# Patient Record
Sex: FEMALE | Race: BLACK OR AFRICAN AMERICAN | NOT HISPANIC OR LATINO | Employment: FULL TIME | URBAN - METROPOLITAN AREA
[De-identification: names, ages, dates, MRNs, and addresses within clinical notes are randomized per-mention and may not be internally consistent; named-entity substitution may affect disease eponyms.]

---

## 2017-01-14 ENCOUNTER — HOSPITAL ENCOUNTER (EMERGENCY)
Facility: CLINIC | Age: 22
Discharge: HOME | End: 2017-01-14
Attending: EMERGENCY MEDICINE | Admitting: EMERGENCY MEDICINE

## 2017-01-14 ENCOUNTER — APPOINTMENT (OUTPATIENT)
Dept: GENERAL RADIOLOGY | Facility: CLINIC | Age: 22
End: 2017-01-14
Attending: EMERGENCY MEDICINE

## 2017-01-14 VITALS
HEART RATE: 84 BPM | TEMPERATURE: 99.8 F | SYSTOLIC BLOOD PRESSURE: 126 MMHG | DIASTOLIC BLOOD PRESSURE: 78 MMHG | RESPIRATION RATE: 16 BRPM | OXYGEN SATURATION: 98 %

## 2017-01-14 DIAGNOSIS — J06.9 UPPER RESPIRATORY TRACT INFECTION, UNSPECIFIED TYPE: ICD-10-CM

## 2017-01-14 LAB
ALBUMIN UR-MCNC: NEGATIVE MG/DL
ANION GAP SERPL CALCULATED.3IONS-SCNC: 4 MMOL/L (ref 3–14)
APPEARANCE UR: CLEAR
BASOPHILS # BLD AUTO: 0 10E9/L (ref 0–0.2)
BASOPHILS NFR BLD AUTO: 0.3 %
BILIRUB UR QL STRIP: NEGATIVE
BUN SERPL-MCNC: 11 MG/DL (ref 7–30)
CALCIUM SERPL-MCNC: 8.9 MG/DL (ref 8.5–10.1)
CHLORIDE SERPL-SCNC: 106 MMOL/L (ref 94–109)
CO2 SERPL-SCNC: 28 MMOL/L (ref 20–32)
COLOR UR AUTO: NORMAL
CREAT SERPL-MCNC: 0.96 MG/DL (ref 0.52–1.04)
DIFFERENTIAL METHOD BLD: ABNORMAL
EOSINOPHIL # BLD AUTO: 0 10E9/L (ref 0–0.7)
EOSINOPHIL NFR BLD AUTO: 0 %
ERYTHROCYTE [DISTWIDTH] IN BLOOD BY AUTOMATED COUNT: 13.3 % (ref 10–15)
FLUAV+FLUBV AG SPEC QL: NEGATIVE
FLUAV+FLUBV AG SPEC QL: NORMAL
GFR SERPL CREATININE-BSD FRML MDRD: 72 ML/MIN/1.7M2
GLUCOSE SERPL-MCNC: 92 MG/DL (ref 70–99)
GLUCOSE UR STRIP-MCNC: NEGATIVE MG/DL
HCT VFR BLD AUTO: 40.5 % (ref 35–47)
HGB BLD-MCNC: 13.7 G/DL (ref 11.7–15.7)
HGB UR QL STRIP: NEGATIVE
IMM GRANULOCYTES # BLD: 0 10E9/L (ref 0–0.4)
IMM GRANULOCYTES NFR BLD: 0.1 %
KETONES UR STRIP-MCNC: NEGATIVE MG/DL
LEUKOCYTE ESTERASE UR QL STRIP: NEGATIVE
LYMPHOCYTES # BLD AUTO: 1.1 10E9/L (ref 0.8–5.3)
LYMPHOCYTES NFR BLD AUTO: 14.9 %
MCH RBC QN AUTO: 31.1 PG (ref 26.5–33)
MCHC RBC AUTO-ENTMCNC: 33.8 G/DL (ref 31.5–36.5)
MCV RBC AUTO: 92 FL (ref 78–100)
MONOCYTES # BLD AUTO: 1.6 10E9/L (ref 0–1.3)
MONOCYTES NFR BLD AUTO: 22.7 %
NEUTROPHILS # BLD AUTO: 4.4 10E9/L (ref 1.6–8.3)
NEUTROPHILS NFR BLD AUTO: 62 %
NITRATE UR QL: NEGATIVE
NRBC # BLD AUTO: 0 10*3/UL
NRBC BLD AUTO-RTO: 0 /100
PH UR STRIP: 6 PH (ref 5–7)
PLATELET # BLD AUTO: 248 10E9/L (ref 150–450)
PLATELET # BLD EST: NORMAL 10*3/UL
POTASSIUM SERPL-SCNC: 4.1 MMOL/L (ref 3.4–5.3)
RBC # BLD AUTO: 4.41 10E12/L (ref 3.8–5.2)
RBC MORPH BLD: NORMAL
SODIUM SERPL-SCNC: 138 MMOL/L (ref 133–144)
SP GR UR STRIP: 1 (ref 1–1.03)
SPECIMEN SOURCE: NORMAL
URN SPEC COLLECT METH UR: NORMAL
UROBILINOGEN UR STRIP-MCNC: NORMAL MG/DL (ref 0–2)
WBC # BLD AUTO: 7.1 10E9/L (ref 4–11)

## 2017-01-14 PROCEDURE — 25000125 ZZHC RX 250: Performed by: EMERGENCY MEDICINE

## 2017-01-14 PROCEDURE — 96375 TX/PRO/DX INJ NEW DRUG ADDON: CPT | Performed by: EMERGENCY MEDICINE

## 2017-01-14 PROCEDURE — 71020 XR CHEST 2 VW: CPT

## 2017-01-14 PROCEDURE — 87804 INFLUENZA ASSAY W/OPTIC: CPT | Performed by: EMERGENCY MEDICINE

## 2017-01-14 PROCEDURE — 96374 THER/PROPH/DIAG INJ IV PUSH: CPT | Performed by: EMERGENCY MEDICINE

## 2017-01-14 PROCEDURE — 80048 BASIC METABOLIC PNL TOTAL CA: CPT | Performed by: EMERGENCY MEDICINE

## 2017-01-14 PROCEDURE — 99284 EMERGENCY DEPT VISIT MOD MDM: CPT | Mod: 25 | Performed by: EMERGENCY MEDICINE

## 2017-01-14 PROCEDURE — 25000132 ZZH RX MED GY IP 250 OP 250 PS 637: Performed by: EMERGENCY MEDICINE

## 2017-01-14 PROCEDURE — 99284 EMERGENCY DEPT VISIT MOD MDM: CPT | Mod: Z6 | Performed by: EMERGENCY MEDICINE

## 2017-01-14 PROCEDURE — 96361 HYDRATE IV INFUSION ADD-ON: CPT | Performed by: EMERGENCY MEDICINE

## 2017-01-14 PROCEDURE — 81003 URINALYSIS AUTO W/O SCOPE: CPT | Performed by: EMERGENCY MEDICINE

## 2017-01-14 PROCEDURE — 25000128 H RX IP 250 OP 636: Performed by: EMERGENCY MEDICINE

## 2017-01-14 PROCEDURE — 85025 COMPLETE CBC W/AUTO DIFF WBC: CPT | Performed by: EMERGENCY MEDICINE

## 2017-01-14 RX ORDER — KETOROLAC TROMETHAMINE 30 MG/ML
30 INJECTION, SOLUTION INTRAMUSCULAR; INTRAVENOUS ONCE
Status: COMPLETED | OUTPATIENT
Start: 2017-01-14 | End: 2017-01-14

## 2017-01-14 RX ORDER — ACETAMINOPHEN 325 MG/1
650 TABLET ORAL ONCE
Status: COMPLETED | OUTPATIENT
Start: 2017-01-14 | End: 2017-01-14

## 2017-01-14 RX ORDER — ONDANSETRON 2 MG/ML
4 INJECTION INTRAMUSCULAR; INTRAVENOUS EVERY 30 MIN PRN
Status: DISCONTINUED | OUTPATIENT
Start: 2017-01-14 | End: 2017-01-14 | Stop reason: HOSPADM

## 2017-01-14 RX ORDER — SODIUM CHLORIDE 9 MG/ML
1000 INJECTION, SOLUTION INTRAVENOUS CONTINUOUS
Status: DISCONTINUED | OUTPATIENT
Start: 2017-01-14 | End: 2017-01-14 | Stop reason: HOSPADM

## 2017-01-14 RX ORDER — IBUPROFEN 600 MG/1
600 TABLET, FILM COATED ORAL EVERY 8 HOURS PRN
Qty: 30 TABLET | Refills: 0 | Status: ON HOLD | OUTPATIENT
Start: 2017-01-14 | End: 2019-02-18

## 2017-01-14 RX ADMIN — SODIUM CHLORIDE 1000 ML: 9 INJECTION, SOLUTION INTRAVENOUS at 07:41

## 2017-01-14 RX ADMIN — ACETAMINOPHEN 650 MG: 325 TABLET, FILM COATED ORAL at 07:34

## 2017-01-14 RX ADMIN — KETOROLAC TROMETHAMINE 30 MG: 30 INJECTION, SOLUTION INTRAMUSCULAR at 07:41

## 2017-01-14 RX ADMIN — ONDANSETRON 4 MG: 2 INJECTION INTRAMUSCULAR; INTRAVENOUS at 07:46

## 2017-01-14 ASSESSMENT — ENCOUNTER SYMPTOMS
COUGH: 1
DYSURIA: 0
NAUSEA: 1
VOMITING: 1
TROUBLE SWALLOWING: 1
FEVER: 1
ABDOMINAL PAIN: 0
FATIGUE: 1

## 2017-01-14 NOTE — ED PROVIDER NOTES
History     Chief Complaint   Patient presents with     Flu Symptoms     HPI  Rajan Graham is a 21 year old female who presents to the ER for 2 days of dizziness, cough, body aches and fever.  Pt say that she feels dizzy and weak when she wakes up.  Has been feeling sore in her throat and vomited x 2 today.  Went to a clinic yesterday and was tested negative for strep throat. Denies any abd pain.  Denies any urinary symptoms.  Notes green sputum with cough. Low grade fever today and has not taken any medications for this.      I have reviewed the Medications, Allergies, Past Medical and Surgical History, and Social History in the Epic system.    Review of Systems   Constitutional: Positive for fever and fatigue.   HENT: Positive for congestion and trouble swallowing.    Respiratory: Positive for cough.    Gastrointestinal: Positive for nausea and vomiting. Negative for abdominal pain.   Genitourinary: Negative for dysuria.   All other systems reviewed and are negative.      Physical Exam   BP: 130/90 mmHg  Pulse: 108  Temp: 100.1  F (37.8  C)  Resp: 16  SpO2: 94 %  Physical Exam   Constitutional: She is oriented to person, place, and time. She appears well-developed and well-nourished.   HENT:   Head: Normocephalic and atraumatic.   Mouth/Throat: Oropharynx is clear and moist. No oropharyngeal exudate.   Neck: Normal range of motion. Neck supple.   Cardiovascular: Normal rate, regular rhythm and normal heart sounds.    Pulmonary/Chest: Effort normal and breath sounds normal. No respiratory distress.   Abdominal: Soft. She exhibits no distension. There is no tenderness. There is no rebound and no guarding.   Musculoskeletal: She exhibits no tenderness.   Neurological: She is alert and oriented to person, place, and time.   Skin: Skin is warm and dry.   Psychiatric: She has a normal mood and affect. Her behavior is normal. Thought content normal.       ED Course   Procedures             Critical Care  time:  none             Results for orders placed or performed during the hospital encounter of 01/14/17   XR Chest 2 Views    Narrative    XR CHEST 2 VW   1/14/2017 8:27 AM     HISTORY: fever, cough    COMPARISON: None.    FINDINGS: The heart is negative.  The lungs are clear. The pulmonary  vasculature is normal.  The bones and soft tissues are unremarkable.      Impression    IMPRESSION: No active infiltrates.        GINA CROCKER MD   CBC with platelets differential   Result Value Ref Range    WBC 7.1 4.0 - 11.0 10e9/L    RBC Count 4.41 3.8 - 5.2 10e12/L    Hemoglobin 13.7 11.7 - 15.7 g/dL    Hematocrit 40.5 35.0 - 47.0 %    MCV 92 78 - 100 fl    MCH 31.1 26.5 - 33.0 pg    MCHC 33.8 31.5 - 36.5 g/dL    RDW 13.3 10.0 - 15.0 %    Platelet Count 248 150 - 450 10e9/L    Diff Method Automated Method     % Neutrophils 62.0 %    % Lymphocytes 14.9 %    % Monocytes 22.7 %    % Eosinophils 0.0 %    % Basophils 0.3 %    % Immature Granulocytes 0.1 %    Nucleated RBCs 0 0 /100    Absolute Neutrophil 4.4 1.6 - 8.3 10e9/L    Absolute Lymphocytes 1.1 0.8 - 5.3 10e9/L    Absolute Monocytes 1.6 (H) 0.0 - 1.3 10e9/L    Absolute Eosinophils 0.0 0.0 - 0.7 10e9/L    Absolute Basophils 0.0 0.0 - 0.2 10e9/L    Abs Immature Granulocytes 0.0 0 - 0.4 10e9/L    Absolute Nucleated RBC 0.0     RBC Morphology Normal     Platelet Estimate Normal    Basic metabolic panel   Result Value Ref Range    Sodium 138 133 - 144 mmol/L    Potassium 4.1 3.4 - 5.3 mmol/L    Chloride 106 94 - 109 mmol/L    Carbon Dioxide 28 20 - 32 mmol/L    Anion Gap 4 3 - 14 mmol/L    Glucose 92 70 - 99 mg/dL    Urea Nitrogen 11 7 - 30 mg/dL    Creatinine 0.96 0.52 - 1.04 mg/dL    GFR Estimate 72 >60 mL/min/1.7m2    GFR Estimate If Black 87 >60 mL/min/1.7m2    Calcium 8.9 8.5 - 10.1 mg/dL   UA reflex to Microscopic and Culture   Result Value Ref Range    Color Urine Light Yellow     Appearance Urine Clear     Glucose Urine Negative NEG mg/dL    Bilirubin Urine Negative NEG     Ketones Urine Negative NEG mg/dL    Specific Gravity Urine 1.004 1.003 - 1.035    Blood Urine Negative NEG    pH Urine 6.0 5.0 - 7.0 pH    Protein Albumin Urine Negative NEG mg/dL    Urobilinogen mg/dL Normal 0.0 - 2.0 mg/dL    Nitrite Urine Negative NEG    Leukocyte Esterase Urine Negative NEG    Source Midstream Urine    Influenza A/B antigen   Result Value Ref Range    Influenza A/B Agn Specimen Nasopharyngeal     Influenza A Negative NEG    Influenza B  NEG     Negative   Test results must be correlated with clinical data. If necessary, results   should be confirmed by a molecular assay or viral culture.       Medications   0.9% sodium chloride BOLUS (0 mLs Intravenous Stopped 1/14/17 1020)   ketorolac (TORADOL) injection 30 mg (30 mg Intravenous Given 1/14/17 4973)   acetaminophen (TYLENOL) tablet 650 mg (650 mg Oral Given 1/14/17 0143)       Labs Ordered and Resulted from Time of ED Arrival Up to the Time of Departure from the ED   CBC WITH PLATELETS DIFFERENTIAL - Abnormal; Notable for the following:     Absolute Monocytes 1.6 (*)     All other components within normal limits   BASIC METABOLIC PANEL   UA MACROSCOPIC WITH REFLEX TO MICRO AND CULTURE   INFLUENZA A/B ANTIGEN       Assessments & Plan (with Medical Decision Making)   Rajan Graham is a 21 year old female who presents complaining of sore throat and headache symptoms.  Patient was seen by medical provider yesterday for the same symptoms.  Patient here got IV fluids, was hydrated, and had labs checked, which were all normal.  Patient s symptoms likely due to a viral URI.  Discussed this diagnosis with the patient.  Patient was feeling better and discharged.    This part of the medical record was transcribed by William Oseguera, Medical Scribe, from a dictation done by Dr. Chiu.     I have reviewed the nursing notes.    I have reviewed the findings, diagnosis, plan and need for follow up with the patient.    New Prescriptions    No  medications on file       Final diagnoses:   Upper respiratory tract infection, unspecified type       1/14/2017   [facility], [place], EMERGENCY DEPARTMENT      Susi Chiu MD  01/14/17 6648

## 2017-01-14 NOTE — DISCHARGE INSTRUCTIONS
Your blood tests and chest xray are normal.   Drink plenty of fluids.   Take tylenol or ibuprofen as needed.   Please make an appointment to follow up with Your Primary Care Provider in 3-5 days even if entirely better.      Viral Upper Respiratory Illness (Adult)  You have a viral upper respiratory illness (URI), which is another term for the common cold. This illness is contagious during the first few days. It is spread through the air by coughing and sneezing. It may also be spread by direct contact (touching the sick person and then touching your own eyes, nose, or mouth). Frequent handwashing will decrease risk of spread. Most viral illnesses go away within 7 to 10 days with rest and simple home remedies. Sometimes the illness may last for several weeks. Antibiotics will not kill a virus, and they are generally not prescribed for this condition.    Home care    If symptoms are severe, rest at home for the first 2 to 3 days. When you resume activity, don't let yourself get too tired.    Avoid being exposed to cigarette smoke (yours or others ).    You may use acetaminophen or ibuprofen to control pain and fever, unless another medicine was prescribed. (Note: If you have chronic liver or kidney disease, have ever had a stomach ulcer or gastrointestinal bleeding, or are taking blood-thinning medicines, talk with your healthcare provider before using these medicines.) Aspirin should never be given to anyone under 18 years of age who is ill with a viral infection or fever. It may cause severe liver or brain damage.    Your appetite may be poor, so a light diet is fine. Avoid dehydration by drinking 6 to 8 glasses of fluids per day (water, soft drinks, juices, tea, or soup). Extra fluids will help loosen secretions in the nose and lungs.    Over-the-counter cold medicines will not shorten the length of time you re sick, but they may be helpful for the following symptoms: cough, sore throat, and nasal and sinus  congestion. (Note: Do not use decongestants if you have high blood pressure.)  Follow-up care  Follow up with your healthcare provider, or as advised.  When to seek medical advice  Call your healthcare provider right away if any of these occur:    Cough with lots of colored sputum (mucus)    Severe headache; face, neck, or ear pain    Difficulty swallowing due to throat pain    Fever of 100.4 F (38 C)  Call 911, or get immediate medical care  Call emergency services right away if any of these occur:    Chest pain, shortness of breath, wheezing, or difficulty breathing    Coughing up blood    Inability to swallow due to throat pain    3238-8598 [employer]. [address] [place] [address], [place] [address]. All rights reserved. This information is not intended as a substitute for professional medical care. Always follow your healthcare professional's instructions.

## 2017-01-14 NOTE — ED AVS SNAPSHOT
[facility], Emergency Department    [address]    [place] [address]    Phone:  [phone]    Fax:  [phone]                                       Rajan Graham   MRN: [id number]    Department:  [facility], Emergency Department   Date of Visit:  1/14/2017           Patient Information     Date Of Birth          1995        Your diagnoses for this visit were:     Upper respiratory tract infection, unspecified type        You were seen by Susi Chiu MD.        Discharge Instructions         Your blood tests and chest xray are normal.   Drink plenty of fluids.   Take tylenol or ibuprofen as needed.   Please make an appointment to follow up with Your Primary Care Provider in 3-5 days even if entirely better.      Viral Upper Respiratory Illness (Adult)  You have a viral upper respiratory illness (URI), which is another term for the common cold. This illness is contagious during the first few days. It is spread through the air by coughing and sneezing. It may also be spread by direct contact (touching the sick person and then touching your own eyes, nose, or mouth). Frequent handwashing will decrease risk of spread. Most viral illnesses go away within 7 to 10 days with rest and simple home remedies. Sometimes the illness may last for several weeks. Antibiotics will not kill a virus, and they are generally not prescribed for this condition.    Home care    If symptoms are severe, rest at home for the first 2 to 3 days. When you resume activity, don't let yourself get too tired.    Avoid being exposed to cigarette smoke (yours or others ).    You may use acetaminophen or ibuprofen to control pain and fever, unless another medicine was prescribed. (Note: If you have chronic liver or kidney disease, have ever had a stomach ulcer or gastrointestinal bleeding, or are taking blood-thinning medicines, talk with your healthcare provider before using these medicines.) Aspirin  should never be given to anyone under 18 years of age who is ill with a viral infection or fever. It may cause severe liver or brain damage.    Your appetite may be poor, so a light diet is fine. Avoid dehydration by drinking 6 to 8 glasses of fluids per day (water, soft drinks, juices, tea, or soup). Extra fluids will help loosen secretions in the nose and lungs.    Over-the-counter cold medicines will not shorten the length of time you re sick, but they may be helpful for the following symptoms: cough, sore throat, and nasal and sinus congestion. (Note: Do not use decongestants if you have high blood pressure.)  Follow-up care  Follow up with your healthcare provider, or as advised.  When to seek medical advice  Call your healthcare provider right away if any of these occur:    Cough with lots of colored sputum (mucus)    Severe headache; face, neck, or ear pain    Difficulty swallowing due to throat pain    Fever of 100.4 F (38 C)  Call 911, or get immediate medical care  Call emergency services right away if any of these occur:    Chest pain, shortness of breath, wheezing, or difficulty breathing    Coughing up blood    Inability to swallow due to throat pain    1637-9535 [employer]. [address] [place] [address]. All rights reserved. This information is not intended as a substitute for professional medical care. Always follow your healthcare professional's instructions.          24 Hour Appointment Hotline       To make an appointment at any [facility], call [phone] ([phone]). If you don't have a family doctor or clinic, we will help you find one. [facility] clinics are conveniently located to serve the needs of you and your family.             Review of your medicines      START taking        Dose / Directions Last dose taken    ibuprofen 600 MG tablet   Commonly known as:  ADVIL/MOTRIN   Dose:  600 mg   Quantity:  30 tablet        Take 1 tablet (600 mg) by mouth  "every 8 hours as needed for moderate pain   Refills:  0          Our records show that you are taking the medicines listed below. If these are incorrect, please call your family doctor or clinic.        Dose / Directions Last dose taken    albuterol 108 (90 BASE) MCG/ACT Inhaler   Commonly known as:  PROAIR HFA/PROVENTIL HFA/VENTOLIN HFA   Dose:  2 puff   Quantity:  18 g        Inhale 2 puffs into the lungs every 6 hours   Refills:  2                Prescriptions were sent or printed at these locations (1 Prescription)                   Other Prescriptions                Printed at Department/Unit printer (1 of 1)         ibuprofen (ADVIL/MOTRIN) 600 MG tablet                Procedures and tests performed during your visit     Basic metabolic panel    CBC with platelets differential    Influenza A/B antigen    UA reflex to Microscopic and Culture    XR Chest 2 Views      Orders Needing Specimen Collection     None      Pending Results     No orders found from 1/13/2017 to 1/15/2017.            Pending Culture Results     No orders found from 1/13/2017 to 1/15/2017.            Thank you for choosing [facility]       Thank you for choosing [facility] for your care. Our goal is always to provide you with excellent care. Hearing back from our patients is one way we can continue to improve our services. Please take a few minutes to complete the written survey that you may receive in the mail after you visit with us. Thank you!        [employer] Information     [employer] lets you send messages to your doctor, view your test results, renew your prescriptions, schedule appointments and more. To sign up, go to [email or web address] . Click on \"Log in\" on the left side of the screen, which will take you to the Welcome page. Then click on \"Sign up Now\" on the right side of the page.     You will be asked to enter the access code listed below, as well as some personal information. Please follow the directions to create your username " and password.     Your access code is: [email or web address]  Expires: 2017 10:20 AM     Your access code will  in 90 days. If you need help or a new code, please call your [facility] clinic or [phone].        Care EveryWhere ID     This is your Care EveryWhere ID. This could be used by other organizations to access your [facility] medical records  [id number]        After Visit Summary       This is your record. Keep this with you and show to your community pharmacist(s) and doctor(s) at your next visit.

## 2017-01-14 NOTE — ED NOTES
Light headed, sore throat, and on/off headaches on leftside of head. Seen at [facility] at 1000 yesterday morning.

## 2017-01-14 NOTE — ED AVS SNAPSHOT
[facility], [place], Emergency Department    [address]    [facility] [place] [address]    Phone:  [phone]    Fax:  [phone]                                       Rajan Graham   MRN: [id number]    Department:  [facility], Emergency Department   Date of Visit:  1/14/2017           After Visit Summary Signature Page     I have received my discharge instructions, and my questions have been answered. I have discussed any challenges I see with this plan with the nurse or doctor.    ..........................................................................................................................................  Patient/Patient Representative Signature      ..........................................................................................................................................  Patient Representative Print Name and Relationship to Patient    ..................................................               ................................................  Date                                            Time    ..........................................................................................................................................  Reviewed by Signature/Title    ...................................................              ..............................................  Date                                                            Time

## 2017-05-31 ENCOUNTER — APPOINTMENT (OUTPATIENT)
Dept: GENERAL RADIOLOGY | Facility: CLINIC | Age: 22
End: 2017-05-31
Attending: EMERGENCY MEDICINE

## 2017-05-31 ENCOUNTER — HOSPITAL ENCOUNTER (EMERGENCY)
Facility: CLINIC | Age: 22
Discharge: LEFT BEFORE DISCHARGE | End: 2017-05-31
Attending: EMERGENCY MEDICINE | Admitting: EMERGENCY MEDICINE

## 2017-05-31 VITALS
WEIGHT: 159.6 LBS | HEIGHT: 67 IN | BODY MASS INDEX: 25.05 KG/M2 | HEART RATE: 82 BPM | TEMPERATURE: 96.6 F | RESPIRATION RATE: 16 BRPM | SYSTOLIC BLOOD PRESSURE: 130 MMHG | OXYGEN SATURATION: 99 % | DIASTOLIC BLOOD PRESSURE: 77 MMHG
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
## 2017-05-31 DIAGNOSIS — S90.852A FOREIGN BODY IN FOOT, LEFT, INITIAL ENCOUNTER: ICD-10-CM

## 2017-05-31 PROCEDURE — 99283 EMERGENCY DEPT VISIT LOW MDM: CPT

## 2017-05-31 PROCEDURE — 99283 EMERGENCY DEPT VISIT LOW MDM: CPT | Mod: Z6 | Performed by: EMERGENCY MEDICINE

## 2017-05-31 PROCEDURE — 73630 X-RAY EXAM OF FOOT: CPT | Mod: LT

## 2017-05-31 RX ORDER — LIDOCAINE HYDROCHLORIDE 10 MG/ML
INJECTION, SOLUTION EPIDURAL; INFILTRATION; INTRACAUDAL; PERINEURAL
Status: DISCONTINUED
Start: 2017-05-31 | End: 2017-05-31 | Stop reason: HOSPADM

## 2017-05-31 RX ORDER — LIDOCAINE HYDROCHLORIDE 20 MG/ML
40 INJECTION, SOLUTION INFILTRATION; PERINEURAL
Status: DISCONTINUED | OUTPATIENT
Start: 2017-05-31 | End: 2017-05-31 | Stop reason: HOSPADM

## 2017-05-31 ASSESSMENT — ENCOUNTER SYMPTOMS
SHORTNESS OF BREATH: 0
COUGH: 0
FEVER: 0
ABDOMINAL PAIN: 0
VOMITING: 0
DIARRHEA: 0

## 2017-05-31 NOTE — ED NOTES
Patient eloped after coming back from x-ray before nurse or MD could re-evaluate.  Patient did sign AMA form.
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
## 2018-11-28 ENCOUNTER — PRENATAL OFFICE VISIT (OUTPATIENT)
Dept: OBGYN | Facility: CLINIC | Age: 23
End: 2018-11-28
Payer: MEDICAID

## 2018-11-28 VITALS
DIASTOLIC BLOOD PRESSURE: 76 MMHG | WEIGHT: 146 LBS | HEIGHT: 65 IN | SYSTOLIC BLOOD PRESSURE: 110 MMHG | BODY MASS INDEX: 24.32 KG/M2

## 2018-11-28 DIAGNOSIS — O21.0 HYPEREMESIS GRAVIDARUM: ICD-10-CM

## 2018-11-28 DIAGNOSIS — O34.219 PREVIOUS CESAREAN DELIVERY, ANTEPARTUM CONDITION OR COMPLICATION: Primary | ICD-10-CM

## 2018-11-28 LAB
ABO + RH BLD: NORMAL
ABO + RH BLD: NORMAL
BLD GP AB SCN SERPL QL: NORMAL
BLOOD BANK CMNT PATIENT-IMP: NORMAL
ERYTHROCYTE [DISTWIDTH] IN BLOOD BY AUTOMATED COUNT: 11.8 % (ref 10–15)
HCT VFR BLD AUTO: 38.7 % (ref 35–47)
HGB BLD-MCNC: 13.5 G/DL (ref 11.7–15.7)
MCH RBC QN AUTO: 31.3 PG (ref 26.5–33)
MCHC RBC AUTO-ENTMCNC: 34.9 G/DL (ref 31.5–36.5)
MCV RBC AUTO: 90 FL (ref 78–100)
PLATELET # BLD AUTO: 307 10E9/L (ref 150–450)
RBC # BLD AUTO: 4.31 10E12/L (ref 3.8–5.2)
SPECIMEN EXP DATE BLD: NORMAL
WBC # BLD AUTO: 7.3 10E9/L (ref 4–11)

## 2018-11-28 PROCEDURE — 85027 COMPLETE CBC AUTOMATED: CPT | Performed by: ADVANCED PRACTICE MIDWIFE

## 2018-11-28 PROCEDURE — 87340 HEPATITIS B SURFACE AG IA: CPT | Performed by: ADVANCED PRACTICE MIDWIFE

## 2018-11-28 PROCEDURE — 87086 URINE CULTURE/COLONY COUNT: CPT | Performed by: ADVANCED PRACTICE MIDWIFE

## 2018-11-28 PROCEDURE — 36415 COLL VENOUS BLD VENIPUNCTURE: CPT | Performed by: ADVANCED PRACTICE MIDWIFE

## 2018-11-28 PROCEDURE — 86850 RBC ANTIBODY SCREEN: CPT | Performed by: ADVANCED PRACTICE MIDWIFE

## 2018-11-28 PROCEDURE — 86900 BLOOD TYPING SEROLOGIC ABO: CPT | Performed by: ADVANCED PRACTICE MIDWIFE

## 2018-11-28 PROCEDURE — 99207 ZZC FIRST OB VISIT: CPT | Performed by: ADVANCED PRACTICE MIDWIFE

## 2018-11-28 PROCEDURE — 87591 N.GONORRHOEAE DNA AMP PROB: CPT | Performed by: ADVANCED PRACTICE MIDWIFE

## 2018-11-28 PROCEDURE — 86901 BLOOD TYPING SEROLOGIC RH(D): CPT | Performed by: ADVANCED PRACTICE MIDWIFE

## 2018-11-28 PROCEDURE — 87389 HIV-1 AG W/HIV-1&-2 AB AG IA: CPT | Performed by: ADVANCED PRACTICE MIDWIFE

## 2018-11-28 PROCEDURE — 87491 CHLMYD TRACH DNA AMP PROBE: CPT | Performed by: ADVANCED PRACTICE MIDWIFE

## 2018-11-28 PROCEDURE — 86762 RUBELLA ANTIBODY: CPT | Performed by: ADVANCED PRACTICE MIDWIFE

## 2018-11-28 PROCEDURE — 86780 TREPONEMA PALLIDUM: CPT | Performed by: ADVANCED PRACTICE MIDWIFE

## 2018-11-28 RX ORDER — METOCLOPRAMIDE 10 MG/1
10 TABLET ORAL
Qty: 60 TABLET | Refills: 1 | Status: SHIPPED | OUTPATIENT
Start: 2018-11-28 | End: 2021-01-13

## 2018-11-28 ASSESSMENT — PATIENT HEALTH QUESTIONNAIRE - PHQ9: SUM OF ALL RESPONSES TO PHQ QUESTIONS 1-9: 7

## 2018-11-28 NOTE — PROGRESS NOTES
11w5d  Previous [facility] patient. Previous C/S() for compound presentation, hand in front of head.   Informed that will see MD but really good candidate for . Pt having lots of vomiting and nausea, discussed at length trying to eat every few hours and better food choices, not making great choices this week.  .  States she may have lost 10 lbs.  Handouts on morning sickness given and will give reglan QID and rtc in 1 week for weight check.  Did not breastfeed other 2 children but will try with this infant.   New FOB; Brendan, he has 4 boys so they are hoping for a girl.  Here today with very active 4 year old.   If unable to keep down food or fluids in 24 hours enc. To call main clinic. PHQ 9 today 7.   Happy with pregnancy rtc in 1 week dmaaso Graham is a  single   3 para 2 0 0 2  with a LMP of of 18 giving an EDC by Noel's rule of 19 who presents for a new OB Visit.  The first positive pregnancy test was obtained on mid October .  Conception date is unknown.  She has not had bleeding since her LMP.  She has had nausea resulting in mutliple vomitings lots of nausea . Weigh loss   has occurred, for a total of 10 pounds.. She denies fever, chills and viral infections since her last LMP.  She stopped taking  (medications) when she suspected pregnancy.  There is moderatefatigue.  This was a planned pregnancy.  She is a previous CNM patient.  FOB is Brendan, new FOB .  =========================================    INFECTION HISTORY  HIV: no  Hepatitis B: no  Hepatitis C: no  Tuberculosis: no  Last PPD   Herpes self: no  Herpes partner:  no  Chlamydia:  no  Gonorrhea:  no  HPV: no  BV:  no  Trichomonis:  no  Syphilis:  no  Chicken Pox:  no  ============================================    PERSONAL/SOCIAL HISTORY  Lives lives with their family.  Employment: Part time.  Her job involves moderate activity and long periods of standing with little potential for toxic exposure.   Additional items: None  =============================================    REVIEW OF SYSTEMS  CONSTITUTIONAL: Denies fever, chills and night sweats  DIET: Appetite is decrease.  Eats a regular.  Caffeine intake daily is 1-2.    Plans to gain 25-35 pounds with her pregnancy.  SKIN: Denies changes and suspicious moles or lesions.  ENT: Denies blurred vision, hearing loss, tinnitus, frequent colds, epistaxis, hoarseness and tooth pain.    ENDOCRINE: Denies heat and cold intolerance, and polydypsia.    BREASTS:  Tender since LMP.  Denies discharge and lumps.   HEART/LUNGS: Denies dyspnea, wheezing, coughing and chest pain.  HEMATOLOGIC: Denies tendency to bruise and history of blood clots.  GASTROINTESTINAL: Denies heartburn, indigestion and change of color in stools.    GENITOURINARY:  Denies urgency, dysuria and hematuria.  Has frequency of urination since LMP.   NEUROLOGIC:  Denies seizures, weakness and fainting.  PSYCHIATRIC:  Denies depression or anxiety  ===========================================    PHYSICAL EXAM  GENERAL:   pleasant pregnant female, alert, cooperative  and well groomed.  SKIN:  Warm and dry, without lesions or tenderness.    HEAD: Symmetrical features.  EYES:  PERRLA, wears no corective lenses.  EARS: Tympanic membranes gray, translucent and intact.  NOSE: No flaring, patent  MOUTH:  Buccal mucosa pink, moist without lesions.  Teeth in good repair.    NECK:  Thyroid without enlargement and nodules.  Lymph nodes not palpable.   LUNGS:  Clear to auscultation.  BREAST:  Symmetrical without lesions or nodes.  Nipples everted.  Areolas symmetric.  No palpable axillary nodes.  HEART:  RRR without murmur.  ABDOMEN: Soft without masses or tenderness.  No CVA tenderness.  Uterus palpable at size equal to dates.    .Well healed scar from  section.  MUSCULOSKELETAL:  Full range of motion  PELVic deferred  UTERUS: 12 weeks size    PELVIS:   Pelvis proven to 7 pounds 4 ounces.  EXTREMITIES:  2+/2+  DTR, No edema. No significant varicosities.    UA;  Negative - glucose, ketones, blood, nitrates, leukocytes, normal urobili, +2 protein, pH 7.5, SG 1.010 small bilirubin  ===================================================    PLAN:  Instructed on use of triage nurse line and contacting the on call CNM after hours in an emergency.    Discussed the indications, uses for and false positives for quad screen and fetal survey ultrasounds at 18-20 weeks gestation.  Will inform us at the next visit if she wished to avail herself of these screens.  Will return to the clinic in 1 weeks for her next routine prenatal check.  Will call to be seen sooner if problem arise.  Discussed the risks, benefits, side effects and alternative therapies for current prescribed and OTC medications.

## 2018-11-28 NOTE — MR AVS SNAPSHOT
"              After Visit Summary   2018    Rajan Graham    MRN: [id number]           Patient Information     Date Of Birth          1995        Visit Information        Provider Department      2018 1:45 PM Liliana Shaffer APRN [place] [facility] [place]        Today's Diagnoses     Previous  delivery, antepartum condition or complication    -  1    Hyperemesis gravidarum           Follow-ups after your visit        Who to contact     If you have questions or need follow up information about today's clinic visit or your schedule please contact [facility] directly at [phone].  Normal or non-critical lab and imaging results will be communicated to you by [employer], letter or phone within 4 business days after the clinic has received the results. If you do not hear from us within 7 days, please contact the clinic through [employer] or phone. If you have a critical or abnormal lab result, we will notify you by phone as soon as possible.  Submit refill requests through [facility] [employer] or call your pharmacy and they will forward the refill request to us. Please allow 3 business days for your refill to be completed.          Additional Information About Your Visit        MyChart Information     [facility] lets you send messages to your doctor, view your test results, renew your prescriptions, schedule appointments and more. To sign up, go to [email or web address] . Click on \"Log in\" on the left side of the screen, which will take you to the Welcome page. Then click on \"Sign up Now\" on the right side of the page.     You will be asked to enter the access code listed below, as well as some personal information. Please follow the directions to create your username and password.     Your access code is: [email or web address]  Expires: 2019  4:11 PM     Your access code will  in 90 days. If you need help or a new code, please call your [facility] or [phone].      " "  Care EveryWhere ID     This is your Care EveryWhere ID. This could be used by other organizations to access your [facility] medical records  [id number]        Your Vitals Were     Height Last Period BMI (Body Mass Index)             5' 5\" (1.651 m) 09/07/2018 (Exact Date) 24.3 kg/m2          Blood Pressure from Last 3 Encounters:   11/28/18 110/76   05/31/17 130/77   01/14/17 126/78    Weight from Last 3 Encounters:   11/28/18 146 lb (66.2 kg)   05/31/17 159 lb 9.6 oz (72.4 kg)   03/25/15 176 lb (79.8 kg)              We Performed the Following     ABO/Rh type and screen     CBC with platelets     CHLAMYDIA TRACHOMATIS PCR     Hepatitis B surface antigen     HIV Antigen Antibody Combo     NEISSERIA GONORRHOEA PCR     Rubella Antibody IgG Quantitative     Treponema Abs w Reflex to RPR and Titer     Urine Culture Aerobic Bacterial          Today's Medication Changes          These changes are accurate as of 11/28/18  4:11 PM.  If you have any questions, ask your nurse or doctor.               Start taking these medicines.        Dose/Directions    metoclopramide 10 MG tablet   Commonly known as:  REGLAN   Used for:  Hyperemesis gravidarum   Started by:  Liliana Shaffer APRN CNM        Dose:  10 mg   Take 1 tablet (10 mg) by mouth 4 times daily (before meals and nightly)   Quantity:  60 tablet   Refills:  1            Where to get your medicines      These medications were sent to [facility] [id number] [place] [address] [address] [place] [address] AT [facility] [address]  [address] [facility] [address]     Phone:  [phone]     metoclopramide 10 MG tablet                Primary Care Provider Office Phone # Fax #    Cherry Ornelas [phone]       [facility] [address] [facility] [address]        Equal Access to Services     KARISSA CASTELLANOS AH: Hadii aad ku hadasho Soomaali, waaxda luqadaha, qaybta kaalmada adeegyada, waxay marvin chicas. " So [facility] [phone].    ATENCIÓN: Si abisai walker, tiene a nash disposición servicios gratuitos de asistencia lingüística. Janeth sharma [phone].    We comply with applicable federal civil rights laws and [place] laws. We do not discriminate on the basis of race, color, national origin, age, disability, sex, sexual orientation, or gender identity.            Thank you!     Thank you [place] [facility]  [place]. Our goal is always to provide you with excellent care. Hearing back from our patients is one way we can continue to improve our services. Please take a few minutes to complete the written survey that you may receive in the mail after your visit with us. Thank you!             Your Updated Medication List - Protect others around you: Learn how to safely use, store and throw away your medicines at www.disposemymeds.org.          This list is accurate as of 11/28/18  4:11 PM.  Always use your most recent med list.                   Brand Name Dispense Instructions for use Diagnosis    albuterol 108 (90 Base) MCG/ACT inhaler    PROAIR HFA/PROVENTIL HFA/VENTOLIN HFA    18 g    Inhale 2 puffs into the lungs every 6 hours    Asthma with allergic rhinitis       ibuprofen 600 MG tablet    ADVIL/MOTRIN    30 tablet    Take 1 tablet (600 mg) by mouth every 8 hours as needed for moderate pain        metoclopramide 10 MG tablet    REGLAN    60 tablet    Take 1 tablet (10 mg) by mouth 4 times daily (before meals and nightly)    Hyperemesis gravidarum

## 2018-11-29 ENCOUNTER — HOSPITAL ENCOUNTER (EMERGENCY)
Facility: CLINIC | Age: 23
Discharge: HOME | End: 2018-11-29
Attending: EMERGENCY MEDICINE | Admitting: EMERGENCY MEDICINE
Payer: MEDICAID

## 2018-11-29 VITALS
SYSTOLIC BLOOD PRESSURE: 119 MMHG | TEMPERATURE: 98.3 F | DIASTOLIC BLOOD PRESSURE: 65 MMHG | RESPIRATION RATE: 16 BRPM | OXYGEN SATURATION: 98 %

## 2018-11-29 DIAGNOSIS — O21.0 HYPEREMESIS GRAVIDARUM: ICD-10-CM

## 2018-11-29 DIAGNOSIS — Z3A.12 12 WEEKS GESTATION OF PREGNANCY: ICD-10-CM

## 2018-11-29 LAB
ANION GAP SERPL CALCULATED.3IONS-SCNC: 6 MMOL/L (ref 3–14)
BACTERIA SPEC CULT: NO GROWTH
BUN SERPL-MCNC: 17 MG/DL (ref 7–30)
C TRACH DNA SPEC QL NAA+PROBE: NEGATIVE
CALCIUM SERPL-MCNC: 9 MG/DL (ref 8.5–10.1)
CHLORIDE SERPL-SCNC: 102 MMOL/L (ref 94–109)
CO2 SERPL-SCNC: 28 MMOL/L (ref 20–32)
CREAT SERPL-MCNC: 0.75 MG/DL (ref 0.52–1.04)
GFR SERPL CREATININE-BSD FRML MDRD: >90 ML/MIN/1.7M2
GLUCOSE SERPL-MCNC: 82 MG/DL (ref 70–99)
HBV SURFACE AG SERPL QL IA: NONREACTIVE
HIV 1+2 AB+HIV1 P24 AG SERPL QL IA: NONREACTIVE
Lab: NORMAL
N GONORRHOEA DNA SPEC QL NAA+PROBE: NEGATIVE
POTASSIUM SERPL-SCNC: 3.5 MMOL/L (ref 3.4–5.3)
RUBV IGG SERPL IA-ACNC: 110 IU/ML
SODIUM SERPL-SCNC: 136 MMOL/L (ref 133–144)
SPECIMEN SOURCE: NORMAL
T PALLIDUM AB SER QL: NONREACTIVE

## 2018-11-29 PROCEDURE — 80048 BASIC METABOLIC PNL TOTAL CA: CPT | Performed by: EMERGENCY MEDICINE

## 2018-11-29 PROCEDURE — 25000128 H RX IP 250 OP 636: Performed by: EMERGENCY MEDICINE

## 2018-11-29 PROCEDURE — 99284 EMERGENCY DEPT VISIT MOD MDM: CPT | Mod: Z6 | Performed by: EMERGENCY MEDICINE

## 2018-11-29 PROCEDURE — 96360 HYDRATION IV INFUSION INIT: CPT

## 2018-11-29 PROCEDURE — 99284 EMERGENCY DEPT VISIT MOD MDM: CPT | Mod: 25

## 2018-11-29 PROCEDURE — 25000131 ZZH RX MED GY IP 250 OP 636 PS 637: Performed by: EMERGENCY MEDICINE

## 2018-11-29 RX ORDER — ONDANSETRON 4 MG/1
4 TABLET, ORALLY DISINTEGRATING ORAL ONCE
Status: COMPLETED | OUTPATIENT
Start: 2018-11-29 | End: 2018-11-29

## 2018-11-29 RX ADMIN — SODIUM CHLORIDE 1000 ML: 9 INJECTION, SOLUTION INTRAVENOUS at 11:51

## 2018-11-29 RX ADMIN — ONDANSETRON 4 MG: 4 TABLET, ORALLY DISINTEGRATING ORAL at 11:41

## 2018-11-29 ASSESSMENT — ENCOUNTER SYMPTOMS
FEVER: 0
COLOR CHANGE: 0
SHORTNESS OF BREATH: 0
EYE REDNESS: 0
NECK STIFFNESS: 0
FREQUENCY: 0
CONFUSION: 0
ABDOMINAL PAIN: 0
VOMITING: 1
ARTHRALGIAS: 0
FATIGUE: 1
UNEXPECTED WEIGHT CHANGE: 1
DIFFICULTY URINATING: 0
DYSURIA: 0
NAUSEA: 1
WEAKNESS: 0
HEADACHES: 0
LIGHT-HEADEDNESS: 0

## 2018-11-29 NOTE — ED AVS SNAPSHOT
[facility], [place], Emergency Department    [address]    [place] [address]    Phone:  [phone]    Fax:  [phone]                                       Rajan Graham   MRN: [id number]    Department:  [facility], Emergency Department   Date of Visit:  11/29/2018           After Visit Summary Signature Page     I have received my discharge instructions, and my questions have been answered. I have discussed any challenges I see with this plan with the nurse or doctor.    ..........................................................................................................................................  Patient/Patient Representative Signature      ..........................................................................................................................................  Patient Representative Print Name and Relationship to Patient    ..................................................               ................................................  Date                                   Time    ..........................................................................................................................................  Reviewed by Signature/Title    ...................................................              ..............................................  Date                                               Time          22EPIC Rev 08/18

## 2018-11-29 NOTE — ED TRIAGE NOTES
Pt states she can not eat  Because nothing is staying down.Shes trying to have waterdowned apple juice.
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
## 2018-11-29 NOTE — ED AVS SNAPSHOT
[facility], Emergency Department    [address]    [facility] [address]    Phone:  [phone]    Fax:  [phone]                                       Rajan Graham   MRN: [id number]    Department:  [facility] [place], Emergency Department   Date of Visit:  11/29/2018           Patient Information     Date Of Birth          1995        Your diagnoses for this visit were:     Hyperemesis gravidarum        You were seen by Gilles Araiza MD.      Follow-up Information     Follow up with Cherry Ornelas.    Specialty:  Nurse Practitioner    Contact information:    [facility]  [address] [id number] [phone]          Discharge Instructions         Use medications as directed     severe Morning Sickness (Hyperemesis Gravidarum)    Nausea and vomiting are common during pregnancy. It is often called  morning sickness,  but it can happen at any time of day. But severe nausea and vomiting that doesn t let up is not normal. Dehydration and weight loss can result. This can be dangerous for the mother and baby. Hyperemesis gravidarum (HEG) is the medical term for severe nausea and vomiting during pregnancy, and it results in weight loss. If you have it, your healthcare provider can take steps to keep you and your baby safe. He or she can also help you find relief.   What are the symptoms of severe morning sickness?  Call your healthcare provider right away if you suspect that you have severe morning sickness. The symptoms include:    Inability to keep down liquids    Nausea that is severe and lasts beyond the first few months    Inability to empty the bladder     Urine that is dark and concentrated     Fainting spells  What causes severe morning sickness?  Nausea and vomiting during pregnancy are thought to be caused by an increase in certain hormone levels. It is not clear what causes severe morning sickness, but it may be more likely in women carrying twins or  more. Your healthcare provider will do some tests to rule out certain health conditions that may lead to severe nausea and vomiting.  Getting relief from morning sickness  To help combat nausea, eat small amounts often. This helps prevent the stomach from being empty, which can make nausea worse. Choose dry foods such as crackers. Try sipping cold, clear drinks. And ask your healthcare provider about taking vitamin B-6 or florence to help ease nausea. Your provider may recommend that you try vitamin B-6 and a medicine called doxylamine to relieve the nausea. In some cases, alternative treatments such as acupuncture are effective in helping managing nausea during pregnancy.  Treating severe morning sickness  The focus of treatment for severe morning sickness is to relieve symptoms and prevent weight loss and dehydration. If you are dehydrated or losing weight, steps are needed to protect you and your baby. You will most likely be admitted to the hospital for at least a short time. There, you can be given IV fluids to rehydrate you. You may also be prescribed medicines that relieve nausea. In very severe cases, a longer hospitalization may be needed. IV nutrition or tube feeding will then be used. If this becomes necessary, your healthcare provider can tell you more.  Recovery and follow-up  With treatment, severe morning sickness can be managed. Follow up with your healthcare provider to be sure you are keeping down fluids and gaining a healthy amount of weight.  When to seek medical care  Contact your healthcare provider right away if you have any of the following:    Signs of dehydration, including extreme thirst, headache, little urine, very dark urine, or a dry, sticky mouth.    Weight loss    Dizziness or fainting    Racing or pounding heart    Blood in your vomit   Date Last Reviewed: 5/1/2016 2000-2018 [employer]. [address] [place] [address], [place] [address]. All rights reserved. This information  is not intended as a substitute for professional medical care. Always follow your healthcare professional's instructions.          Hyperemesis Gravidarum  Hyperemesis gravidarum is a severe form of morning sickness that can affect some women during pregnancy. It may develop around the 5th week and last until the 16th week of pregnancy. In some women, it may last longer. Symptoms include severe nausea and vomiting. This can lead to problems such as weight loss and dehydration.  It's not clear what causes hyperemesis gravidarum. It may be from rising hormone levels early in the pregnancy. It can be a serious threat to mother and fetus if symptoms are severe. Follow the advice below carefully. If your symptoms don't get better with home care measures, you may need to stay in the hospital. In the hospital, you may get IV (intravenous) fluids and medicines.  Home care  Diet    Keep a log of the foods you eat and how they affect your symptoms. Don't eat foods that trigger your symptoms.    Eat small meals often throughout the day rather than 3 large meals. This can help keep your stomach from being empty. An empty stomach can make nausea worse.    Choose foods that are high in carbohydrates. Eating foods high in protein may also help. Limit greasy or spicy foods.    Before getting out of bed in the morning, try eating crackers or dry toast. This may help settle your stomach.    Drink cold, clear liquids. Drinking small amounts of liquids with electrolytes, such as sports drinks, may help as well.  Medicine    If needed, your healthcare provider may prescribe certain medicines to help ease nausea and vomiting. Your provider may suggest vitamin B6 and florence. Don t try any over-the-counter medicines or home remedies without talking with your provider first.  Follow-up care  Follow up with your healthcare provider, or as advised.  When to seek medical advice  Call your healthcare provider right away if any of these  occur:    Signs of dehydration. These include dry mouth, extreme thirst, dark urine or little urine output, dizziness, weakness, or fainting.    Vomiting that won t stop    Inability to keep down liquids    Frequent diarrhea    Weight loss or no weight gain over a 2-week period    Severe constant pain in the lower right abdomen    Fever of 100.4 F (38 C) or higher, or as directed by your healthcare provider  Date Last Reviewed: 6/1/2018 2000-2018 [employer]. [address] [place] [address]. All rights reserved. This information is not intended as a substitute for professional medical care. Always follow your healthcare professional's instructions.          24 Hour Appointment Hotline       To make an appointment at any [facility], call [phone] ([phone]). If you don't have a family doctor or clinic, we will help you find one. [facility] are conveniently located to serve the needs of you and your family.             Review of your medicines      Our records show that you are taking the medicines listed below. If these are incorrect, please call your family doctor or clinic.        Dose / Directions Last dose taken    albuterol 108 (90 Base) MCG/ACT inhaler   Commonly known as:  PROAIR HFA/PROVENTIL HFA/VENTOLIN HFA   Dose:  2 puff   Quantity:  18 g        Inhale 2 puffs into the lungs every 6 hours   Refills:  2        ibuprofen 600 MG tablet   Commonly known as:  ADVIL/MOTRIN   Dose:  600 mg   Quantity:  30 tablet        Take 1 tablet (600 mg) by mouth every 8 hours as needed for moderate pain   Refills:  0        metoclopramide 10 MG tablet   Commonly known as:  REGLAN   Dose:  10 mg   Quantity:  60 tablet        Take 1 tablet (10 mg) by mouth 4 times daily (before meals and nightly)   Refills:  1        TYLENOL PO   Dose:  250 mg        Take 250 mg by mouth   Refills:  0                Procedures and tests performed during your visit     Basic metabolic panel     "  Orders Needing Specimen Collection     Ordered          18 1139  UA with Microscopic reflex to Culture - STAT, Prio: STAT, Needs to be Collected     Scheduled Task Status   18 1140 Collect UA with Microscopic reflex to Culture Open   Order Class:  PCU Collect                  Pending Results     Date and Time Order Name Status Description    2018 1545 URINE CULTURE AEROBIC BACTERIAL Preliminary             Pending Culture Results     Date and Time Order Name Status Description    2018 1545 URINE CULTURE AEROBIC BACTERIAL Preliminary             Pending Results Instructions     If you had any lab results that were not finalized at the time of your Discharge, you can call the ED Lab Result RN at [phone]. You will be contacted by this team for any positive Lab results or changes in treatment. The nurses are available 7 days a week from 10A to 6:30P.  You can leave a message 24 hours per day and they will return your call.        Thank you for choosing [facility]       Thank you for choosing [facility] for your care. Our goal is always to provide you with excellent care. Hearing back from our patients is one way we can continue to improve our services. Please take a few minutes to complete the written survey that you may receive in the mail after you visit with us. Thank you!        IPTEGO Information     IPTEGO lets you send messages to your doctor, view your test results, renew your prescriptions, schedule appointments and more. To sign up, go to [email or web address] . Click on \"Log in\" on the left side of the screen, which will take you to the Welcome page. Then click on \"Sign up Now\" on the right side of the page.     You will be asked to enter the access code listed below, as well as some personal information. Please follow the directions to create your username and password.     Your access code is: [email or web address]  Expires: 2019  4:11 PM     Your access code will  in 90 " days. If you need help or a new code, please call your [facility] clinic or [phone].        Care EveryWhere ID     This is your Care EveryWhere ID. This could be used by other organizations to access your [facility] medical records  [id number]        Equal Access to Services     KARISSA CASTELLANOS : Rosangela Malcolm, watawanada luqadaha, qaybta kaalmada gisella, karen chicas. So [facility] [phone].    ATENCIÓN: Si habla español, tiene a nash disposición servicios gratuitos de asistencia lingüística. Llame al [phone].    We comply with applicable federal civil rights laws and [place] laws. We do not discriminate on the basis of race, color, national origin, age, disability, sex, sexual orientation, or gender identity.            After Visit Summary       This is your record. Keep this with you and show to your community pharmacist(s) and doctor(s) at your next visit.

## 2018-11-29 NOTE — DISCHARGE INSTRUCTIONS
Use medications as directed     severe Morning Sickness (Hyperemesis Gravidarum)    Nausea and vomiting are common during pregnancy. It is often called  morning sickness,  but it can happen at any time of day. But severe nausea and vomiting that doesn t let up is not normal. Dehydration and weight loss can result. This can be dangerous for the mother and baby. Hyperemesis gravidarum (HEG) is the medical term for severe nausea and vomiting during pregnancy, and it results in weight loss. If you have it, your healthcare provider can take steps to keep you and your baby safe. He or she can also help you find relief.   What are the symptoms of severe morning sickness?  Call your healthcare provider right away if you suspect that you have severe morning sickness. The symptoms include:    Inability to keep down liquids    Nausea that is severe and lasts beyond the first few months    Inability to empty the bladder     Urine that is dark and concentrated     Fainting spells  What causes severe morning sickness?  Nausea and vomiting during pregnancy are thought to be caused by an increase in certain hormone levels. It is not clear what causes severe morning sickness, but it may be more likely in women carrying twins or more. Your healthcare provider will do some tests to rule out certain health conditions that may lead to severe nausea and vomiting.  Getting relief from morning sickness  To help combat nausea, eat small amounts often. This helps prevent the stomach from being empty, which can make nausea worse. Choose dry foods such as crackers. Try sipping cold, clear drinks. And ask your healthcare provider about taking vitamin B-6 or florence to help ease nausea. Your provider may recommend that you try vitamin B-6 and a medicine called doxylamine to relieve the nausea. In some cases, alternative treatments such as acupuncture are effective in helping managing nausea during pregnancy.  Treating severe morning  sickness  The focus of treatment for severe morning sickness is to relieve symptoms and prevent weight loss and dehydration. If you are dehydrated or losing weight, steps are needed to protect you and your baby. You will most likely be admitted to the hospital for at least a short time. There, you can be given IV fluids to rehydrate you. You may also be prescribed medicines that relieve nausea. In very severe cases, a longer hospitalization may be needed. IV nutrition or tube feeding will then be used. If this becomes necessary, your healthcare provider can tell you more.  Recovery and follow-up  With treatment, severe morning sickness can be managed. Follow up with your healthcare provider to be sure you are keeping down fluids and gaining a healthy amount of weight.  When to seek medical care  Contact your healthcare provider right away if you have any of the following:    Signs of dehydration, including extreme thirst, headache, little urine, very dark urine, or a dry, sticky mouth.    Weight loss    Dizziness or fainting    Racing or pounding heart    Blood in your vomit   Date Last Reviewed: 5/1/2016 2000-2018 [employer]. [address] [place] [address]. All rights reserved. This information is not intended as a substitute for professional medical care. Always follow your healthcare professional's instructions.          Hyperemesis Gravidarum  Hyperemesis gravidarum is a severe form of morning sickness that can affect some women during pregnancy. It may develop around the 5th week and last until the 16th week of pregnancy. In some women, it may last longer. Symptoms include severe nausea and vomiting. This can lead to problems such as weight loss and dehydration.  It's not clear what causes hyperemesis gravidarum. It may be from rising hormone levels early in the pregnancy. It can be a serious threat to mother and fetus if symptoms are severe. Follow the advice below carefully. If  your symptoms don't get better with home care measures, you may need to stay in the hospital. In the hospital, you may get IV (intravenous) fluids and medicines.  Home care  Diet    Keep a log of the foods you eat and how they affect your symptoms. Don't eat foods that trigger your symptoms.    Eat small meals often throughout the day rather than 3 large meals. This can help keep your stomach from being empty. An empty stomach can make nausea worse.    Choose foods that are high in carbohydrates. Eating foods high in protein may also help. Limit greasy or spicy foods.    Before getting out of bed in the morning, try eating crackers or dry toast. This may help settle your stomach.    Drink cold, clear liquids. Drinking small amounts of liquids with electrolytes, such as sports drinks, may help as well.  Medicine    If needed, your healthcare provider may prescribe certain medicines to help ease nausea and vomiting. Your provider may suggest vitamin B6 and florence. Don t try any over-the-counter medicines or home remedies without talking with your provider first.  Follow-up care  Follow up with your healthcare provider, or as advised.  When to seek medical advice  Call your healthcare provider right away if any of these occur:    Signs of dehydration. These include dry mouth, extreme thirst, dark urine or little urine output, dizziness, weakness, or fainting.    Vomiting that won t stop    Inability to keep down liquids    Frequent diarrhea    Weight loss or no weight gain over a 2-week period    Severe constant pain in the lower right abdomen    Fever of 100.4 F (38 C) or higher, or as directed by your healthcare provider  Date Last Reviewed: 6/1/2018 2000-2018 [employer]. [address] [place] [address], [place] [address]. All rights reserved. This information is not intended as a substitute for professional medical care. Always follow your healthcare professional's instructions.

## 2018-12-05 ENCOUNTER — PRENATAL OFFICE VISIT (OUTPATIENT)
Dept: OBGYN | Facility: CLINIC | Age: 23
End: 2018-12-05
Payer: MEDICAID

## 2018-12-05 VITALS — WEIGHT: 150 LBS | DIASTOLIC BLOOD PRESSURE: 70 MMHG | BODY MASS INDEX: 24.96 KG/M2 | SYSTOLIC BLOOD PRESSURE: 118 MMHG

## 2018-12-05 DIAGNOSIS — O34.219 PREVIOUS CESAREAN DELIVERY, ANTEPARTUM CONDITION OR COMPLICATION: Primary | ICD-10-CM

## 2018-12-05 PROCEDURE — 99207 ZZC PRENATAL VISIT: CPT | Performed by: ADVANCED PRACTICE MIDWIFE

## 2018-12-05 NOTE — PROGRESS NOTES
Feeling well, better since hospital visit for fluids. Able to eat and drink now, gained 4lbs this week. No other concerns. Not feeling baby move yet. No cramping, LOF or bleeding. RTC in 3 weeks, will order US at next visit. MML

## 2018-12-05 NOTE — MR AVS SNAPSHOT
"              After Visit Summary   2018    Rajan Graham    MRN: [id number]           Patient Information     Date Of Birth          1995        Visit Information        Provider Department      2018 11:00 AM Zahraa Arciniega APRN CNM [facility]        Today's Diagnoses     Previous  delivery, antepartum condition or complication    -  1       Follow-ups after your visit        Who to contact     If you have questions or need follow up information about today's clinic visit or your schedule please contact [facility] directly at [phone].  Normal or non-critical lab and imaging results will be communicated to you by [employer]Bothart, letter or phone within 4 business days after the clinic has received the results. If you do not hear from us within 7 days, please contact the clinic through [employer] or phone. If you have a critical or abnormal lab result, we will notify you by phone as soon as possible.  Submit refill requests through [employer]Play or call your pharmacy and they will forward the refill request to us. Please allow 3 business days for your refill to be completed.          Additional Information About Your Visit        MyChart Information     [employer]Play lets you send messages to your doctor, view your test results, renew your prescriptions, schedule appointments and more. To sign up, go to [email or web address] . Click on \"Log in\" on the left side of the screen, which will take you to the Welcome page. Then click on \"Sign up Now\" on the right side of the page.     You will be asked to enter the access code listed below, as well as some personal information. Please follow the directions to create your username and password.     Your access code is: [email or web address]  Expires: 2019  4:11 PM     Your access code will  in 90 days. If you need help or a new code, please call your [facility] or [phone].        Care EveryWhere ID     This is " your Care EveryWhere ID. This could be used by other organizations to access your [facility] medical records  [id number]        Your Vitals Were     Last Period BMI (Body Mass Index)                09/07/2018 (Exact Date) 24.96 kg/m2           Blood Pressure from Last 3 Encounters:   12/05/18 118/70   11/29/18 119/65   11/28/18 110/76    Weight from Last 3 Encounters:   12/05/18 150 lb (68 kg)   11/29/18 (P) 146 lb (66.2 kg)   11/28/18 146 lb (66.2 kg)              Today, you had the following     No orders found for display       Primary Care Provider Office Phone # Fax #    Cherry Ornelas [phone]       [facility] [address] [facility] [address]        Equal Access to Services     KARISSA CASTELLANOS : Hadii aad ku hadasho Soomaali, waaxda luqadaha, qaybta kaalmada adeegyada, karen chicas. So [facility] [phone].    ATENCIÓN: Si habla español, tiene a nash disposición servicios gratuitos de asistencia lingüística. Janeth al [phone].    We comply with applicable federal civil rights laws and [place] laws. We do not discriminate on the basis of race, color, national origin, age, disability, sex, sexual orientation, or gender identity.            Thank you!     Thank you for choosing [facility]  for your care. Our goal is always to provide you with excellent care. Hearing back from our patients is one way we can continue to improve our services. Please take a few minutes to complete the written survey that you may receive in the mail after your visit with us. Thank you!             Your Updated Medication List - Protect others around you: Learn how to safely use, store and throw away your medicines at www.disposemymeds.org.          This list is accurate as of 12/5/18 11:32 AM.  Always use your most recent med list.                   Brand Name Dispense Instructions for use Diagnosis    albuterol 108 (90 Base) MCG/ACT inhaler    PROAIR  HFA/PROVENTIL HFA/VENTOLIN HFA    18 g    Inhale 2 puffs into the lungs every 6 hours    Asthma with allergic rhinitis       ibuprofen 600 MG tablet    ADVIL/MOTRIN    30 tablet    Take 1 tablet (600 mg) by mouth every 8 hours as needed for moderate pain        metoclopramide 10 MG tablet    REGLAN    60 tablet    Take 1 tablet (10 mg) by mouth 4 times daily (before meals and nightly)    Hyperemesis gravidarum       TYLENOL PO      Take 250 mg by mouth

## 2019-01-02 ENCOUNTER — PRENATAL OFFICE VISIT (OUTPATIENT)
Dept: OBGYN | Facility: CLINIC | Age: 24
End: 2019-01-02

## 2019-01-02 VITALS — SYSTOLIC BLOOD PRESSURE: 112 MMHG | WEIGHT: 154 LBS | DIASTOLIC BLOOD PRESSURE: 64 MMHG | BODY MASS INDEX: 25.63 KG/M2

## 2019-01-02 DIAGNOSIS — O34.219 PREVIOUS CESAREAN DELIVERY, ANTEPARTUM CONDITION OR COMPLICATION: ICD-10-CM

## 2019-01-02 PROCEDURE — 99207 ZZC PRENATAL VISIT: CPT | Performed by: ADVANCED PRACTICE MIDWIFE

## 2019-01-02 NOTE — PROGRESS NOTES
16w5d  Doing well, feeling like gaining weight now and less nauseated.  Feeling some fetal movement/flutters.  Pt will decline quad screen and have fetal survey U/S in 3 weeks with MD appt to follow for TOLAC consent signed.   rtc @ [facility] in 6 weeks damaso

## 2019-01-05 ENCOUNTER — NURSE TRIAGE (OUTPATIENT)
Dept: NURSING | Facility: CLINIC | Age: 24
End: 2019-01-05

## 2019-01-05 NOTE — TELEPHONE ENCOUNTER
Pt reports an episode of vertigo 2 hours ago, she laid down, took a nap, woke up 10 minutes ago and now feels fine.  She reports last month she was seen in the hospital for dehydration and to receive IV fluids.  She denies having symptoms at this time.  Urinated last 10 min ago, drinking fluids.      Disposition:  See provider within 24 hrs.  Office will be closed.  Per care advice, writer recommended that pt call back in the morning, after 7am and we can page the on-call midwife for further instructions.  Advised her to call back sooner if symptoms worsen or new symptoms come up.  Pt verbalized understanding and had no further questions.     Jennie Alvarenga RN/BECKIE    Reason for Disposition    [1] MODERATE dizziness (e.g., vertigo; feels very unsteady, interferes with normal activities) AND [2] has NOT been evaluated by physician for this    Additional Information    Negative: [1] Weakness (i.e., paralysis, loss of muscle strength) of the face, arm or leg on one side of the body AND [2] sudden onset AND [3] present now    Negative: [1] Numbness (i.e., loss of sensation) of the face, arm or leg on one side of the body AND [2] sudden onset AND [3] present now    Negative: [1] Loss of speech or garbled speech AND [2] sudden onset AND [3] present now    Negative: Difficult to awaken or acting confused  (e.g., disoriented, slurred speech)    Negative: Sounds like a life-threatening emergency to the triager    Negative: Followed a head injury    Negative: Followed an ear injury    Negative: Localized weakness or numbness is main symptom    Negative: Dizziness relates to riding in a car, going to an amusement park, etc.    Negative: [1] Dizziness is main symptom AND [2] NO spinning sensation (i.e., vertigo)    Negative: SEVERE dizziness (vertigo) (e.g., unable to walk without assistance)    Negative: [1] Dizziness (vertigo) present now AND [2] one or more stroke risk factors (i.e., hypertension, diabetes, prior  stroke/TIA/heart attack)  (Exception: prior physician evaluation for this AND no different/worse than usual)    Negative: [1] Dizziness (vertigo) present now AND [2] age > 60  (Exception: prior physician evaluation for this AND no different/worse than usual)    Negative: Severe headache (e.g., excruciating)  (Exception: similar to previous migraines)    Negative: Patient sounds very sick or weak to the triager    Negative: Taking a medicine that could cause dizziness (e.g., phenytoin [Dilantin], carbamazepine [Tegretol], primidone [Mysoline])    Protocols used: DIZZINESS - VERTIGO-ADULT-

## 2019-01-18 ENCOUNTER — PRENATAL OFFICE VISIT (OUTPATIENT)
Dept: OBGYN | Facility: CLINIC | Age: 24
End: 2019-01-18
Attending: ADVANCED PRACTICE MIDWIFE
Payer: MEDICAID

## 2019-01-18 ENCOUNTER — ANCILLARY PROCEDURE (OUTPATIENT)
Dept: ULTRASOUND IMAGING | Facility: CLINIC | Age: 24
End: 2019-01-18
Attending: ADVANCED PRACTICE MIDWIFE
Payer: MEDICAID

## 2019-01-18 DIAGNOSIS — O34.219 PREVIOUS CESAREAN DELIVERY, ANTEPARTUM CONDITION OR COMPLICATION: ICD-10-CM

## 2019-01-18 PROCEDURE — 76805 OB US >/= 14 WKS SNGL FETUS: CPT | Performed by: OBSTETRICS & GYNECOLOGY

## 2019-01-18 NOTE — PROGRESS NOTES
Patient had survey ultrasound today but left clinic without attending prenatal visit appointment.  AM

## 2019-02-13 ENCOUNTER — PRENATAL OFFICE VISIT (OUTPATIENT)
Dept: OBGYN | Facility: CLINIC | Age: 24
End: 2019-02-13
Payer: MEDICAID

## 2019-02-13 VITALS — BODY MASS INDEX: 25.96 KG/M2 | WEIGHT: 156 LBS | SYSTOLIC BLOOD PRESSURE: 116 MMHG | DIASTOLIC BLOOD PRESSURE: 70 MMHG

## 2019-02-13 DIAGNOSIS — O34.219 PREVIOUS CESAREAN DELIVERY, ANTEPARTUM CONDITION OR COMPLICATION: ICD-10-CM

## 2019-02-13 DIAGNOSIS — Z34.92 PRENATAL CARE IN SECOND TRIMESTER: Primary | ICD-10-CM

## 2019-02-13 PROCEDURE — 59425 ANTEPARTUM CARE ONLY: CPT | Performed by: ADVANCED PRACTICE MIDWIFE

## 2019-02-13 PROCEDURE — 99207 ZZC PRENATAL VISIT: CPT | Performed by: ADVANCED PRACTICE MIDWIFE

## 2019-02-13 NOTE — PROGRESS NOTES
Feeling well.  Baby is active. Denies any leaking of fluid, vaginal bleeding, regular uterine contractions, or headaches or other concerns.  Had a bad experience with her US and TOLAC consult.  She never had the consult because she said she waited 30 minutes, was never called and then left.  Note written to  clinic manager.  She agrees to go back to  for there GCT and TOLAC consult.  JOEL Munroe, assisted her to make the appt.    Reviewed to call [phone] for contractions, loss of fluid, vaginal bleeding, decreased fetal movement or any other questions or concerns.    RTC in 2-3 weeks.  Mary Xiong, DNP, APRN, CNM

## 2019-02-18 ENCOUNTER — HOSPITAL ENCOUNTER (OUTPATIENT)
Facility: CLINIC | Age: 24
Discharge: HOME | End: 2019-02-18
Attending: ADVANCED PRACTICE MIDWIFE | Admitting: ADVANCED PRACTICE MIDWIFE
Payer: MEDICAID

## 2019-02-18 VITALS
DIASTOLIC BLOOD PRESSURE: 55 MMHG | TEMPERATURE: 98.4 F | HEART RATE: 77 BPM | SYSTOLIC BLOOD PRESSURE: 124 MMHG | RESPIRATION RATE: 18 BRPM

## 2019-02-18 DIAGNOSIS — Z36.89 ENCOUNTER FOR TRIAGE IN PREGNANT PATIENT: ICD-10-CM

## 2019-02-18 DIAGNOSIS — K59.00 CONSTIPATION, UNSPECIFIED CONSTIPATION TYPE: Primary | ICD-10-CM

## 2019-02-18 LAB
ALBUMIN UR-MCNC: 30 MG/DL
APPEARANCE UR: CLEAR
BACTERIA #/AREA URNS HPF: ABNORMAL /HPF
BILIRUB UR QL STRIP: NEGATIVE
COLOR UR AUTO: YELLOW
GLUCOSE UR STRIP-MCNC: NEGATIVE MG/DL
HGB UR QL STRIP: NEGATIVE
KETONES UR STRIP-MCNC: 40 MG/DL
LEUKOCYTE ESTERASE UR QL STRIP: NEGATIVE
MUCOUS THREADS #/AREA URNS LPF: PRESENT /LPF
NITRATE UR QL: NEGATIVE
PH UR STRIP: 6 PH (ref 5–7)
RBC #/AREA URNS AUTO: <1 /HPF (ref 0–2)
SOURCE: ABNORMAL
SP GR UR STRIP: 1.03 (ref 1–1.03)
SPECIMEN SOURCE: NORMAL
SQUAMOUS #/AREA URNS AUTO: 1 /HPF (ref 0–1)
UROBILINOGEN UR STRIP-MCNC: NORMAL MG/DL (ref 0–2)
WBC #/AREA URNS AUTO: 1 /HPF (ref 0–5)
WET PREP SPEC: NORMAL

## 2019-02-18 PROCEDURE — 59025 FETAL NON-STRESS TEST: CPT | Mod: 26 | Performed by: ADVANCED PRACTICE MIDWIFE

## 2019-02-18 PROCEDURE — 87591 N.GONORRHOEAE DNA AMP PROB: CPT | Performed by: ADVANCED PRACTICE MIDWIFE

## 2019-02-18 PROCEDURE — 81001 URINALYSIS AUTO W/SCOPE: CPT | Performed by: ADVANCED PRACTICE MIDWIFE

## 2019-02-18 PROCEDURE — 25000132 ZZH RX MED GY IP 250 OP 250 PS 637: Performed by: ADVANCED PRACTICE MIDWIFE

## 2019-02-18 PROCEDURE — 99213 OFFICE O/P EST LOW 20 MIN: CPT | Mod: 25 | Performed by: ADVANCED PRACTICE MIDWIFE

## 2019-02-18 PROCEDURE — 87210 SMEAR WET MOUNT SALINE/INK: CPT | Performed by: ADVANCED PRACTICE MIDWIFE

## 2019-02-18 PROCEDURE — 87491 CHLMYD TRACH DNA AMP PROBE: CPT | Performed by: ADVANCED PRACTICE MIDWIFE

## 2019-02-18 PROCEDURE — G0463 HOSPITAL OUTPT CLINIC VISIT: HCPCS

## 2019-02-18 RX ORDER — ACETAMINOPHEN 325 MG/1
975 TABLET ORAL EVERY 6 HOURS PRN
Qty: 1 BOTTLE | Refills: 0 | Status: ON HOLD | OUTPATIENT
Start: 2019-02-18 | End: 2019-04-15

## 2019-02-18 RX ORDER — ACETAMINOPHEN 325 MG/1
975 TABLET ORAL EVERY 6 HOURS PRN
Status: DISCONTINUED | OUTPATIENT
Start: 2019-02-18 | End: 2019-02-18 | Stop reason: HOSPADM

## 2019-02-18 RX ADMIN — ACETAMINOPHEN 975 MG: 325 TABLET, FILM COATED ORAL at 12:26
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
## 2019-02-18 NOTE — PROGRESS NOTES
Pt seen by Zahraa Arciniega CNM.  Discharge instructions, follow up and when to call the providers were discussed.  Pt states understanding.  Pt will go to discharge pharmacy to  her tylenol and stool softner rx.  Pt home undelivered.      Facility fee only.

## 2019-02-18 NOTE — PROGRESS NOTES
"Summary:  Received report from Deepika Vargas RN.  Pt is 23.3 wks. . Wants to TOLAC.  Here for  labor eval.  Has reported lower/lateral uterine cramps for past 3 days.  None per toco.  Pt reported unsure if movement or cramp.  Pt reports intact. No vag bleeding.  Pt reported constipation and this morning \"having hard time going and then diarhea with it\".  Followed by an emesis.  Eating, drinking now.  Given warm packs and will give tylenol.  Off monitor per israel Arciniega cnm.  Lab results are pending.  Ua/uc sent.  Wet prep and GC/chly.  Here with her 2 children.  Call light is within reach.    "

## 2019-02-18 NOTE — PROVIDER NOTIFICATION
02/18/19 1011   Provider Notification   Provider Name/Title JUAREZ Arciniega CNM   Method of Notification Electronic Page   Request Evaluate in Person   Notification Reason Patient Arrived   Pt 23w3d, here for abdominal cramping for last 3days. No bleeding, no leaking

## 2019-02-18 NOTE — LETTER
February 21, 2019      Rajan Graham  [address]  [place] [address]        Dear ,    We are writing to inform you of your test results.    Your test results fall within the expected range(s) or remain unchanged from previous results.  Please continue with current treatment plan.    Resulted Orders   Chlamydia trachomatis PCR   Result Value Ref Range    Specimen Description Vagina     Chlamydia Trachomatis PCR Negative NEG^Negative      Comment:      Negative for C. trachomatis rRNA by transcription mediated amplification.  A negative result by transcription mediated amplification does not preclude   the presence of C. trachomatis infection because results are dependent on   proper and adequate collection, absence of inhibitors, and sufficient rRNA to   be detected.     Neisseria gonorrhoea PCR   Result Value Ref Range    Specimen Descrip Vagina     N Gonorrhea PCR Negative NEG^Negative      Comment:      Negative for N. gonorrhoeae rRNA by transcription mediated amplification.  A negative result by transcription mediated amplification does not preclude   the presence of N. gonorrhoeae infection because results are dependent on   proper and adequate collection, absence of inhibitors, and sufficient rRNA to   be detected.         If you have any questions or concerns, please call the clinic at the number listed above.       Sincerely,        No name on file.

## 2019-02-18 NOTE — DISCHARGE INSTRUCTIONS
Discharge Instruction for Undelivered Patients          Diet:   As tolerated.     Activity:  As tolerated.    Call your provider if you notice:  Swelling in your face or increased swelling in your hands or legs.  Headaches that are not relieved by Tylenol (acetaminophen).  Changes in your vision (blurring: seeing spots or stars.)  Nausea (sick to your stomach) and vomiting (throwing up).   Weight gain of 5 pounds or more per week.  Heartburn that doesn't go away.  Signs of bladder infection: pain when you urinate (use the toilet), need to go more often and more urgently.  The bag of mott (rupture of membranes) breaks, or you notice leaking in your underwear.  Bright red blood in your underwear.  Abdominal (lower belly) or stomach pain.  For first baby: Contractions (tightening) less than 5 minutes apart for one hour or more.  Second (plus) baby: Contractions (tightening) less than 10 minutes apart and getting stronger.  *If less than 34 weeks: Contractions (tightenings) more than 6 times in one hour.  Increase or change in vaginal discharge (note the color and amount)      Follow-up:  Keep routine prenatal visits.  Please call your clinic if you have any questions or concerns.  Rest.  Drink a minimum of  3 [facility] mugs of water each day.

## 2019-02-19 LAB
C TRACH DNA SPEC QL NAA+PROBE: NEGATIVE
N GONORRHOEA DNA SPEC QL NAA+PROBE: NEGATIVE
SPECIMEN SOURCE: NORMAL
SPECIMEN SOURCE: NORMAL

## 2019-03-04 ENCOUNTER — TELEPHONE (OUTPATIENT)
Dept: OBGYN | Facility: CLINIC | Age: 24
End: 2019-03-04

## 2019-03-04 ENCOUNTER — PRENATAL OFFICE VISIT (OUTPATIENT)
Dept: OBGYN | Facility: CLINIC | Age: 24
End: 2019-03-04
Payer: COMMERCIAL

## 2019-03-04 VITALS
WEIGHT: 160 LBS | BODY MASS INDEX: 26.63 KG/M2 | SYSTOLIC BLOOD PRESSURE: 116 MMHG | DIASTOLIC BLOOD PRESSURE: 61 MMHG | HEART RATE: 101 BPM | OXYGEN SATURATION: 100 %

## 2019-03-04 DIAGNOSIS — O34.219 PREVIOUS CESAREAN DELIVERY, ANTEPARTUM CONDITION OR COMPLICATION: ICD-10-CM

## 2019-03-04 DIAGNOSIS — K59.00 CONSTIPATION, UNSPECIFIED CONSTIPATION TYPE: ICD-10-CM

## 2019-03-04 LAB
GLUCOSE 1H P 50 G GLC PO SERPL-MCNC: 91 MG/DL (ref 60–129)
HGB BLD-MCNC: 11.8 G/DL (ref 11.7–15.7)

## 2019-03-04 PROCEDURE — 99207 ZZC PRENATAL VISIT: CPT | Performed by: OBSTETRICS & GYNECOLOGY

## 2019-03-04 PROCEDURE — 00000218 ZZHCL STATISTIC OBHBG - HEMOGLOBIN: Performed by: OBSTETRICS & GYNECOLOGY

## 2019-03-04 PROCEDURE — 36415 COLL VENOUS BLD VENIPUNCTURE: CPT | Performed by: OBSTETRICS & GYNECOLOGY

## 2019-03-04 PROCEDURE — 82950 GLUCOSE TEST: CPT | Performed by: OBSTETRICS & GYNECOLOGY

## 2019-03-04 NOTE — PROGRESS NOTES
One hour glucose today.  I did her primary C/S 3/2015 (compound presentation, baby weighed 4# 8oz).  First delivery vaginal, 7# 12 oz.  Had survey ultrasound here 1/18, size=dates, tells me she was to see me that day but was so upset at the way the ultrasound had gone, had her very active daughter with her, that she left the clinic.  She is interested in TOLAC.      Discussed, we will plan [facility] ultrasound at 28-30 weeks (sooner if they request) and I will see her back after that to discuss mode of delivery.   AM

## 2019-03-04 NOTE — TELEPHONE ENCOUNTER
Reason for call:  Medication   If this is a refill request, has the caller requested the refill from the pharmacy already? N/A  Will the patient be using a [facility]? Yes  Name of the pharmacy and phone number for the current request: [facility]    Name of the medication requested: stool softener    Other request: na    Phone number to reach patient:  Home number on file [phone] (home)    Best Time:  any    Can we leave a detailed message on this number?  YES

## 2019-03-04 NOTE — TELEPHONE ENCOUNTER
Medication was discontinued. I do have medication and pharmacy teed up. Please sign if okay. Thanks!  Elena Godinez

## 2019-03-04 NOTE — LETTER
March 4, 2019      Rajan Graham  [address]  [place] [address]        Dear ,    We are writing to inform you of your test results.    Your test results fall within the expected range(s) or remain unchanged from previous results.  Please continue with current treatment plan.    Resulted Orders   Glucose tolerance gest screen 1 hour   Result Value Ref Range    Glu Gest Screen 1hr 50g 91 60 - 129 mg/dL   OB hemoglobin   Result Value Ref Range    Hemoglobin 11.8 11.7 - 15.7 g/dL       If you have any questions or concerns, please call the clinic at the number listed above.       Sincerely,        Amber Almanzar MD

## 2019-03-22 ENCOUNTER — PRE VISIT (OUTPATIENT)
Dept: MATERNAL FETAL MEDICINE | Facility: CLINIC | Age: 24
End: 2019-03-22

## 2019-03-27 ENCOUNTER — OFFICE VISIT (OUTPATIENT)
Dept: MATERNAL FETAL MEDICINE | Facility: CLINIC | Age: 24
End: 2019-03-27
Attending: OBSTETRICS & GYNECOLOGY
Payer: COMMERCIAL

## 2019-03-27 ENCOUNTER — HOSPITAL ENCOUNTER (OUTPATIENT)
Dept: ULTRASOUND IMAGING | Facility: CLINIC | Age: 24
Discharge: HOME | End: 2019-03-27
Attending: OBSTETRICS & GYNECOLOGY | Admitting: OBSTETRICS & GYNECOLOGY
Payer: COMMERCIAL

## 2019-03-27 DIAGNOSIS — O26.90 PREGNANCY RELATED CONDITION, ANTEPARTUM: ICD-10-CM

## 2019-03-27 DIAGNOSIS — Z87.59 PREVIOUS BABY WITH FETAL GROWTH RESTRICTION: Primary | ICD-10-CM

## 2019-03-27 DIAGNOSIS — Z36.89 ENCOUNTER FOR ULTRASOUND TO CHECK FETAL GROWTH: ICD-10-CM

## 2019-03-27 PROCEDURE — 76811 OB US DETAILED SNGL FETUS: CPT

## 2019-03-27 NOTE — PROGRESS NOTES
"Please see \"Imaging\" tab under \"Chart Review\" for details of today's US.    Ester Blanco, DO    "

## 2019-04-03 ENCOUNTER — PRENATAL OFFICE VISIT (OUTPATIENT)
Dept: OBGYN | Facility: CLINIC | Age: 24
End: 2019-04-03
Payer: COMMERCIAL

## 2019-04-03 VITALS — WEIGHT: 165 LBS | BODY MASS INDEX: 27.46 KG/M2 | SYSTOLIC BLOOD PRESSURE: 120 MMHG | DIASTOLIC BLOOD PRESSURE: 60 MMHG

## 2019-04-03 DIAGNOSIS — O34.219 PREVIOUS CESAREAN DELIVERY, ANTEPARTUM CONDITION OR COMPLICATION: ICD-10-CM

## 2019-04-03 PROCEDURE — 99207 ZZC PRENATAL VISIT: CPT | Performed by: ADVANCED PRACTICE MIDWIFE

## 2019-04-03 NOTE — PROGRESS NOTES
29w5d  Patient had U/S last week and discussed results of 33%tile of growth but 7%tile of AC and will have followup with MFM on 4/17/19.  Enc. To increase protein dietary sources. Pt. Reassured and would like to TOLAC.  May travel to [place] ~36 weeks, enc. To get copy of records before travel.   rtc in 4 weeks b

## 2019-04-15 ENCOUNTER — HOSPITAL ENCOUNTER (OUTPATIENT)
Facility: CLINIC | Age: 24
Discharge: HOME | End: 2019-04-16
Attending: ADVANCED PRACTICE MIDWIFE | Admitting: ADVANCED PRACTICE MIDWIFE
Payer: COMMERCIAL

## 2019-04-15 ENCOUNTER — NURSE TRIAGE (OUTPATIENT)
Dept: NURSING | Facility: CLINIC | Age: 24
End: 2019-04-15

## 2019-04-15 VITALS — TEMPERATURE: 97.9 F | RESPIRATION RATE: 16 BRPM

## 2019-04-15 PROCEDURE — G0463 HOSPITAL OUTPT CLINIC VISIT: HCPCS

## 2019-04-16 PROCEDURE — 59025 FETAL NON-STRESS TEST: CPT | Mod: 26 | Performed by: ADVANCED PRACTICE MIDWIFE

## 2019-04-16 PROCEDURE — 99213 OFFICE O/P EST LOW 20 MIN: CPT | Mod: 25 | Performed by: ADVANCED PRACTICE MIDWIFE

## 2019-04-16 PROCEDURE — G0463 HOSPITAL OUTPT CLINIC VISIT: HCPCS

## 2019-04-16 PROCEDURE — 59025 FETAL NON-STRESS TEST: CPT

## 2019-04-16 NOTE — DISCHARGE INSTRUCTIONS
Discharge Instruction for Undelivered Patients      You were seen for: abdominal pain  We Consulted: Mary Xiong CNM  You had (Test or Medicine):fetal non stress test     Diet:   Drink 8 to 12 glasses of liquids (milk, juice, water) every day.  You may eat meals and snacks.     Activity:  Count fetal kicks everyday (see handout)  Call your doctor or nurse midwife if your baby is moving less than usual.     Call your provider if you notice:  Swelling in your face or increased swelling in your hands or legs.  Headaches that are not relieved by Tylenol (acetaminophen).  Changes in your vision (blurring: seeing spots or stars.)  Nausea (sick to your stomach) and vomiting (throwing up).   Weight gain of 5 pounds or more per week.  Heartburn that doesn't go away.  Signs of bladder infection: pain when you urinate (use the toilet), need to go more often and more urgently.  The bag of mott (rupture of membranes) breaks, or you notice leaking in your underwear.  Bright red blood in your underwear.  Abdominal (lower belly) or stomach pain.  For first baby: Contractions (tightening) less than 5 minutes apart for one hour or more.  Second (plus) baby: Contractions (tightening) less than 10 minutes apart and getting stronger.  *If less than 34 weeks: Contractions (tightenings) more than 6 times in one hour.  Increase or change in vaginal discharge (note the color and amount)      Follow-up:  As scheduled in the clinic

## 2019-04-16 NOTE — TELEPHONE ENCOUNTER
"Rajan is 31 weeks pregnant.     Shereports having a lot of abdominal pain that started about 4 pm.    The pain is worse with standing and walking, but she still feels the pain when lying down.    She rates the pain 8 on 10 point scale    Advised to report to [facility] ER and/or Labor and Delivery.    8:24 pm - page sent to [facility] on call Mary Xiong:    messageID: [id number],   Rajan Graham   MRN: [id number]    =   Pt # = [phone]   Severe abdominal pain since 4pm. Worse standing/walking; Rated 8/10 Advised to go to [facility] ER &/or L&D   PCP = JOEL Calle RN  [facility]          Reason for Disposition    MODERATE-SEVERE abdominal pain (e.g., interferes with normal activities, awakens from sleep)    Additional Information    Negative: Passed out (i.e., lost consciousness, collapsed and was not responding)    Negative: Shock suspected (e.g., cold/pale/clammy skin, too weak to stand, low BP, rapid pulse)    Negative: Difficult to awaken or acting confused  (e.g., disoriented, slurred speech)    Negative: [1] SEVERE abdominal pain (e.g., excruciating) AND [2] constant AND [3] present > 1 hour    Negative: SEVERE vaginal bleeding (e.g., continuous red blood from vagina, or large blood clots)    Negative: Sounds like a life-threatening emergency to the triager    Negative: [1] Vomiting AND [2] contains red blood or black (\"coffee ground\") material  (Exception: few red streaks in vomit that only happened once)    Protocols used: PREGNANCY - ABDOMINAL PAIN GREATER THAN 20 WEEKS EGA-ADULT-      "
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
## 2019-04-16 NOTE — PROGRESS NOTES
Data: [facility] at 2216.   Reason for maternal/fetal assessment per patient is Rule Out Labor  . Patient is a . Prenatal record reviewed.      OB History    Para Term  AB Living   3 2 2 0 0 2   SAB TAB Ectopic Multiple Live Births   0 0 0 0 2      # Outcome Date GA Lbr Roque/2nd Weight Sex Delivery Anes PTL Lv   3 Current            2 Term 03/26/15 38w4d  2.155 kg (4 lb 12 oz) F CS-LTranv None N RONEN      Complications: Compound presentation of fetus      Apgar1: 9  Apgar5: 9   1 Term 11 40w3d 01:30 / 00:08 3.289 kg (7 lb 4 oz) M Vag-Spont Local N RONEN      Name: REMY SHANE BABY      Apgar1: 9  Apgar5: 9      Medical History:   Past Medical History:   Diagnosis Date     Asthma with allergic rhinitis     Albuterol inhaler prn--worse in the summer     Wounds and injuries     Broken arm   . Gestational Age 31w4d. VSS. Cervix: closed.  Fetal movement present. Patient denies, vaginal discharge, pelvic pressure, UTI symptoms, GI problems, bloody show, vaginal bleeding, edema, headache, visual disturbances, epigastric pain or rupture of membranes. Support persons not present.  Action: Verbal consent for EFM. Triage assessment completed. EFM applied for NST. Uterine assessment WNL. Fetal assessment: Presumed adequate fetal oxygenation documented (see flow record). Patient education pamphlets given on fetal movement counts. Patient instructed to report change in fetal movement, vaginal leaking of fluid or bleeding, abdominal pain, or any concerns related to the pregnancy to her nurse/physician.   Response: Mary Xiong CNM informed of status. Plan per provider is discharge home. Patient verbalized understanding of education and verbalized agreement with plan. Discharged ambulatory at 0045.

## 2019-04-17 ENCOUNTER — HOSPITAL ENCOUNTER (OUTPATIENT)
Dept: ULTRASOUND IMAGING | Facility: CLINIC | Age: 24
Discharge: HOME | End: 2019-04-17
Attending: OBSTETRICS & GYNECOLOGY | Admitting: OBSTETRICS & GYNECOLOGY
Payer: COMMERCIAL

## 2019-04-17 ENCOUNTER — OFFICE VISIT (OUTPATIENT)
Dept: MATERNAL FETAL MEDICINE | Facility: CLINIC | Age: 24
End: 2019-04-17
Attending: OBSTETRICS & GYNECOLOGY
Payer: COMMERCIAL

## 2019-04-17 DIAGNOSIS — O36.5990 PREGNANCY AFFECTED BY FETAL GROWTH RESTRICTION: Primary | ICD-10-CM

## 2019-04-17 DIAGNOSIS — Z36.89 ENCOUNTER FOR ULTRASOUND TO CHECK FETAL GROWTH: ICD-10-CM

## 2019-04-17 PROCEDURE — 76816 OB US FOLLOW-UP PER FETUS: CPT

## 2019-04-17 PROCEDURE — 76819 FETAL BIOPHYS PROFIL W/O NST: CPT | Performed by: OBSTETRICS & GYNECOLOGY

## 2019-04-17 NOTE — PROGRESS NOTES
Please see ultrasound report under imaging tab for details on ultrasound performed today.    Ruth Michele MD  , OB/GYN  Maternal-Fetal Medicine  [email or web address]  [phone] (Academic office)  [phone] (Pager)

## 2019-04-17 NOTE — NURSING NOTE
Pt at [facility] for growth ultrasound.  Reports triage visit a few days ago and has concerns.  States she was told her cervix was closed but the midwife could feel her baby and her mother thinks she should not have been discharged if they could feel her baby.  Also reporting continued pain.  States she would like a support belt but is unsure of where to get one.  Writer spoke with JOEL Branham at [facility].  Pt offered same day appt with CNM at [facility] to address concerns.  Pt updated and agrees to plan.  Will present to [facility] after [facility] appt.  Aware she may have to wait a bit to be seen.

## 2019-04-24 ENCOUNTER — OFFICE VISIT (OUTPATIENT)
Dept: MATERNAL FETAL MEDICINE | Facility: CLINIC | Age: 24
End: 2019-04-24
Attending: OBSTETRICS & GYNECOLOGY
Payer: COMMERCIAL

## 2019-04-24 ENCOUNTER — HOSPITAL ENCOUNTER (OUTPATIENT)
Dept: ULTRASOUND IMAGING | Facility: CLINIC | Age: 24
Discharge: HOME | End: 2019-04-24
Attending: OBSTETRICS & GYNECOLOGY | Admitting: OBSTETRICS & GYNECOLOGY
Payer: COMMERCIAL

## 2019-04-24 DIAGNOSIS — O36.5990 PREGNANCY AFFECTED BY FETAL GROWTH RESTRICTION: ICD-10-CM

## 2019-04-24 DIAGNOSIS — O36.5990 PREGNANCY AFFECTED BY FETAL GROWTH RESTRICTION: Primary | ICD-10-CM

## 2019-04-24 PROCEDURE — 76819 FETAL BIOPHYS PROFIL W/O NST: CPT

## 2019-04-24 PROCEDURE — 76820 UMBILICAL ARTERY ECHO: CPT

## 2019-04-24 NOTE — PROGRESS NOTES
"Please see \"Imaging\" tab under \"Chart Review\" for details of today's US.      Jojo Ingram, DO  Maternal-Fetal Medicine        "

## 2019-05-03 ENCOUNTER — HOSPITAL ENCOUNTER (OUTPATIENT)
Dept: ULTRASOUND IMAGING | Facility: CLINIC | Age: 24
Discharge: HOME | End: 2019-05-03
Attending: OBSTETRICS & GYNECOLOGY | Admitting: OBSTETRICS & GYNECOLOGY
Payer: COMMERCIAL

## 2019-05-03 ENCOUNTER — OFFICE VISIT (OUTPATIENT)
Dept: MATERNAL FETAL MEDICINE | Facility: CLINIC | Age: 24
End: 2019-05-03
Attending: OBSTETRICS & GYNECOLOGY
Payer: COMMERCIAL

## 2019-05-03 DIAGNOSIS — O36.5990 PREGNANCY AFFECTED BY FETAL GROWTH RESTRICTION: Primary | ICD-10-CM

## 2019-05-03 DIAGNOSIS — O36.5990 PREGNANCY AFFECTED BY FETAL GROWTH RESTRICTION: ICD-10-CM

## 2019-05-03 PROCEDURE — 76819 FETAL BIOPHYS PROFIL W/O NST: CPT | Performed by: OBSTETRICS & GYNECOLOGY

## 2019-05-03 PROCEDURE — 76816 OB US FOLLOW-UP PER FETUS: CPT

## 2019-05-03 NOTE — PROGRESS NOTES
"Please see \"Imaging\" tab under \"Chart Review\" for details of today's visit.    Rodrigue Villafuerte    "

## 2019-05-08 ENCOUNTER — OFFICE VISIT (OUTPATIENT)
Dept: MATERNAL FETAL MEDICINE | Facility: CLINIC | Age: 24
End: 2019-05-08
Attending: OBSTETRICS & GYNECOLOGY
Payer: COMMERCIAL

## 2019-05-08 ENCOUNTER — HOSPITAL ENCOUNTER (OUTPATIENT)
Dept: ULTRASOUND IMAGING | Facility: CLINIC | Age: 24
Discharge: HOME | End: 2019-05-08
Attending: OBSTETRICS & GYNECOLOGY | Admitting: OBSTETRICS & GYNECOLOGY
Payer: COMMERCIAL

## 2019-05-08 DIAGNOSIS — O36.5990 PREGNANCY AFFECTED BY FETAL GROWTH RESTRICTION: Primary | ICD-10-CM

## 2019-05-08 DIAGNOSIS — O36.5990 PREGNANCY AFFECTED BY FETAL GROWTH RESTRICTION: ICD-10-CM

## 2019-05-08 PROCEDURE — 76819 FETAL BIOPHYS PROFIL W/O NST: CPT

## 2019-05-08 PROCEDURE — 76820 UMBILICAL ARTERY ECHO: CPT | Performed by: OBSTETRICS & GYNECOLOGY

## 2019-05-15 ENCOUNTER — PRENATAL OFFICE VISIT (OUTPATIENT)
Dept: OBGYN | Facility: CLINIC | Age: 24
End: 2019-05-15
Payer: COMMERCIAL

## 2019-05-15 ENCOUNTER — HOSPITAL ENCOUNTER (OUTPATIENT)
Dept: ULTRASOUND IMAGING | Facility: CLINIC | Age: 24
Discharge: HOME | End: 2019-05-15
Attending: OBSTETRICS & GYNECOLOGY | Admitting: OBSTETRICS & GYNECOLOGY
Payer: COMMERCIAL

## 2019-05-15 ENCOUNTER — OFFICE VISIT (OUTPATIENT)
Dept: MATERNAL FETAL MEDICINE | Facility: CLINIC | Age: 24
End: 2019-05-15
Attending: OBSTETRICS & GYNECOLOGY
Payer: COMMERCIAL

## 2019-05-15 VITALS
WEIGHT: 174 LBS | BODY MASS INDEX: 28.96 KG/M2 | SYSTOLIC BLOOD PRESSURE: 124 MMHG | DIASTOLIC BLOOD PRESSURE: 77 MMHG | TEMPERATURE: 98.8 F | HEART RATE: 91 BPM

## 2019-05-15 VITALS — SYSTOLIC BLOOD PRESSURE: 106 MMHG | DIASTOLIC BLOOD PRESSURE: 60 MMHG | BODY MASS INDEX: 28.79 KG/M2 | WEIGHT: 173 LBS

## 2019-05-15 DIAGNOSIS — Z11.3 SCREENING EXAMINATION FOR VENEREAL DISEASE: Primary | ICD-10-CM

## 2019-05-15 DIAGNOSIS — O36.5930 POOR FETAL GROWTH AFFECTING MANAGEMENT OF MOTHER IN THIRD TRIMESTER, SINGLE OR UNSPECIFIED FETUS: ICD-10-CM

## 2019-05-15 DIAGNOSIS — O34.219 DESIRES VAGINAL BIRTH AFTER CESAREAN TRIAL: Primary | ICD-10-CM

## 2019-05-15 DIAGNOSIS — Z34.80 PRENATAL CARE OF MULTIGRAVIDA, ANTEPARTUM: ICD-10-CM

## 2019-05-15 DIAGNOSIS — O34.219 PREVIOUS CESAREAN DELIVERY, ANTEPARTUM CONDITION OR COMPLICATION: ICD-10-CM

## 2019-05-15 DIAGNOSIS — O36.5990 PREGNANCY AFFECTED BY FETAL GROWTH RESTRICTION: Primary | ICD-10-CM

## 2019-05-15 DIAGNOSIS — O36.5990 PREGNANCY AFFECTED BY FETAL GROWTH RESTRICTION: ICD-10-CM

## 2019-05-15 LAB — HEMOGLOBIN: 9.5 G/DL (ref 11.7–15.7)

## 2019-05-15 PROCEDURE — 87653 STREP B DNA AMP PROBE: CPT | Performed by: ADVANCED PRACTICE MIDWIFE

## 2019-05-15 PROCEDURE — 59425 ANTEPARTUM CARE ONLY: CPT | Performed by: OBSTETRICS & GYNECOLOGY

## 2019-05-15 PROCEDURE — 87491 CHLMYD TRACH DNA AMP PROBE: CPT | Performed by: ADVANCED PRACTICE MIDWIFE

## 2019-05-15 PROCEDURE — 99207 ZZC PRENATAL VISIT: CPT | Performed by: OBSTETRICS & GYNECOLOGY

## 2019-05-15 PROCEDURE — 00000218 HCL OB HEMOGLOBIN (NS/SS): Performed by: ADVANCED PRACTICE MIDWIFE

## 2019-05-15 PROCEDURE — 87591 N.GONORRHOEAE DNA AMP PROB: CPT | Performed by: ADVANCED PRACTICE MIDWIFE

## 2019-05-15 PROCEDURE — 36415 COLL VENOUS BLD VENIPUNCTURE: CPT | Performed by: ADVANCED PRACTICE MIDWIFE

## 2019-05-15 PROCEDURE — 99207 ZZC PRENATAL VISIT: CPT | Performed by: ADVANCED PRACTICE MIDWIFE

## 2019-05-15 PROCEDURE — 76819 FETAL BIOPHYS PROFIL W/O NST: CPT

## 2019-05-15 ASSESSMENT — PATIENT HEALTH QUESTIONNAIRE - PHQ9: SUM OF ALL RESPONSES TO PHQ QUESTIONS 1-9: 7

## 2019-05-15 NOTE — Clinical Note
Ruth,  You are Seeing her for TOLAC consult at 3:00 before her MFM appt at 3:30.    Had 36 week labs done today.    Gumaro

## 2019-05-15 NOTE — Clinical Note
MARIA DE JESUSI - I sent orders for Stephany to schedule a CS for this patient as a back up plan if her cervix is not favorable when IOL is recommended. I could barely reach it today. Ruth Holder MD

## 2019-05-15 NOTE — PROGRESS NOTES
OB Consult for TOLAC  5/15/2019     Rajan Graham is a 24 year old  at 35w5d who presents to assess mode of delivery.     She has had one previous low transverse  section and is interested in trial of labor after .     Estimated Date of Delivery: 2019     Her current pregnancy is complicated by fetal growth restriction. Most recent ultrasound 5/3/19 at 34w0d: EFW 3lb 15 oz (1,789g), 12th%. Cephalic, posterior placenta. Growth parameters and estimated fetal weight were consistent with fetal growth restriction with fetal AC < 5th percentile. Per MFMF, plan to re-evaluate fetal growth in 3 weeks and continue with weekly surveillance. If surveillance and Dopplers remain within normal limits, and there is appropriate interval growth, recommend delivery at 38-39 weeks gestation.    She is seeing [facility] practice for prenatal care. Please refer to the antepartum records for further details.    Active fetal movement. No contractions. No leaking fluid, bleeding, abnormal discharge.       OB History    Para Term  AB Living   3 2 2 0 0 2   SAB TAB Ectopic Multiple Live Births   0 0 0 0 2      # Outcome Date GA Lbr Roque/2nd Weight Sex Delivery Anes PTL Lv   3 Current            2 Term 03/26/15 38w4d  2.155 kg (4 lb 12 oz) F CS-LTranv None N RONEN      Complications: Compound presentation of fetus      Apgar1: 9  Apgar5: 9   1 Term 11 40w3d 01:30 / 00:08 3.289 kg (7 lb 4 oz) M Vag-Spont Local N RONEN      Name: ACOSTA,REMY BABY      Apgar1: 9  Apgar5: 9       TOLAC Candidate Assessment  Prior c/s date 3/26/15      Indication: compound presentation in labor   Operative note is available and has been reviewed.    Incision: low transverse  Uterine closure:  double layer  The procedure and the post operative course were uncomplicated.       Future Pregnancy Plans: unsure   calculator:  79/9%  [email or web address]    SCE: 0/50/-3/medium/posterior (very difficult to reach)    ASSESSMENT:   Rajan Graham is a 24 year old  at 35w5d with history of previous low transverse  section x1. She is a candidate for TOLAC but we discussed that it may not be possible to achieve  if she needs an induction and has an unfavorable cervix.  Today her cervix is closed and very posterior.    PLAN:    The options of trial of labor after  (TOLAC) and repeat  section (RCS) were discussed in detail with her.  We reviewed the risks and benefits of both.  We discussed the risks of repeat  including infection, excessive blood loss, injury to other organs, potential need for hysterectomy, risks of anesthesia. We discussed the risks of vaginal birth after  (), and in particular discussed the risk of uterine rupture and incidence of 0.5 to 1%.  We discussed the fact that this can be a catastrophic event for both the baby and the mother, requiring an immediate emergency RCS probably under general anesthesia, with associated increased operative risks.  We discussed the possible fetal damage or death, and the possible grave maternal risks, which can include hysterectomy, excessive blood loss or death.  We discussed the fact that we have in-house anesthesia, NICU, and OB personnel who can respond to these emergencies immediatedly at [facility], but also discussed the fact that even under these conditions a good outcome for mother and baby cannot be guaranteed. We discussed the need for labs, IV access and continuous fetal monitoring during her labor and delivery.  Her questions were answered in detail.     She wishes to proceed with a trial of labor if spontaneous labor or her cervix is favorable when induction is indicated. Written consent obtained.  We discussed a plan of scheduling a  at 38 to 39 weeks as  a back-up plan if her cervix is not favorable when induction is indicated.    She is well-informed regarding her choices.    RTC for routine obstetrical care.     Ruth Holder MD

## 2019-05-15 NOTE — PROGRESS NOTES
Rajan is here for her PNV.  Pregnancy has been complicated by AC lag and history of undiagnosed IUGR at term with her last child less than 2500 Gm.  That pregnancy delivery was due to fetal position of fetus and compound presentation after SROM.    Is interest in  and is good candidate with previous vaginal birth of 7#  4 oz male with her 1st child.    Comprehensive US next week for growth as last growth overall at 12%.  If growth lag next week may require IOL.    ASSESSMENT: 35w5d   Hx of IUGR.  C/S last pregnacy desires TOLAC.  Anemia mild at 9.5% at 36 wks.     PLAN: RTC in 2 wks at [facility] and MFM weekly appt with BPP.  GBS/GC/Chlamydia testing today.     MG

## 2019-05-15 NOTE — NURSING NOTE
D: Sonographer placed patient in consult room after BPP. Spoke to Dr. Blanco regarding the scan and it was noted that the sonographer did not do UAR for FGR. Sonographer went to place patient back in the room to perform UAR and the patient had left the clinic before being seen by Dr. Blanco. Dr. Blanco called and left voicemail message for patient to call the clinic to get the UAR performed this week. Will await phone call from patient.   Dacia Braswell RN

## 2019-05-16 ENCOUNTER — TELEPHONE (OUTPATIENT)
Dept: OBGYN | Facility: CLINIC | Age: 24
End: 2019-05-16

## 2019-05-16 ENCOUNTER — TELEPHONE (OUTPATIENT)
Dept: MATERNAL FETAL MEDICINE | Facility: CLINIC | Age: 24
End: 2019-05-16

## 2019-05-16 DIAGNOSIS — O36.5990 PREGNANCY AFFECTED BY FETAL GROWTH RESTRICTION: Primary | ICD-10-CM

## 2019-05-16 LAB
C TRACH DNA SPEC QL NAA+PROBE: NEGATIVE
GP B STREP DNA SPEC QL NAA+PROBE: NEGATIVE
N GONORRHOEA DNA SPEC QL NAA+PROBE: NEGATIVE
SPECIMEN SOURCE: NORMAL

## 2019-05-16 NOTE — TELEPHONE ENCOUNTER
Per M RNs, writer reached out to pt to schedule a limited US with pt d/t sonographer not doing an UAR at 5/15 [facility]. Asked patient to come in today or tomorrow for a UAR limited US. Patient declined as she is going out of town for her son's field trip. Stated she will come back at her next appt on 5/22. RNs notified.      Kay Gregorio  Scheduling Coorindator

## 2019-05-16 NOTE — TELEPHONE ENCOUNTER
Type of surgery: ob  Location of surgery: [facility] OR  Date and time of surgery: 6/7/19 1030a  Surgeon: Tanya  Pre-Op Appt Date: tbd  Post-Op Appt Date: 6 weeks   Packet sent out: Yes  Pre-cert/Authorization completed:  No  Date: 5/16/2019    Stephany PASTOR, Surgery Coordinator

## 2019-05-22 ENCOUNTER — HOSPITAL ENCOUNTER (OUTPATIENT)
Dept: ULTRASOUND IMAGING | Facility: CLINIC | Age: 24
Discharge: HOME | End: 2019-05-22
Attending: OBSTETRICS & GYNECOLOGY | Admitting: OBSTETRICS & GYNECOLOGY
Payer: COMMERCIAL

## 2019-05-22 ENCOUNTER — OFFICE VISIT (OUTPATIENT)
Dept: MATERNAL FETAL MEDICINE | Facility: CLINIC | Age: 24
End: 2019-05-22
Attending: OBSTETRICS & GYNECOLOGY
Payer: COMMERCIAL

## 2019-05-22 DIAGNOSIS — O36.5990 PREGNANCY AFFECTED BY FETAL GROWTH RESTRICTION: ICD-10-CM

## 2019-05-22 DIAGNOSIS — O36.5990 PREGNANCY AFFECTED BY FETAL GROWTH RESTRICTION: Primary | ICD-10-CM

## 2019-05-22 PROCEDURE — 76819 FETAL BIOPHYS PROFIL W/O NST: CPT | Performed by: OBSTETRICS & GYNECOLOGY

## 2019-05-22 PROCEDURE — 76816 OB US FOLLOW-UP PER FETUS: CPT

## 2019-05-29 ENCOUNTER — APPOINTMENT (OUTPATIENT)
Dept: OBGYN | Facility: CLINIC | Age: 24
End: 2019-05-29
Payer: COMMERCIAL

## 2019-05-31 ENCOUNTER — AMBULATORY - HEALTHEAST (OUTPATIENT)
Dept: MATERNAL FETAL MEDICINE | Facility: HOSPITAL | Age: 24
End: 2019-05-31

## 2019-05-31 DIAGNOSIS — Z01.818 PRE-OP EXAM: Primary | ICD-10-CM

## 2019-05-31 DIAGNOSIS — O26.90 PREGNANCY, ANTEPARTUM, COMPLICATIONS: ICD-10-CM

## 2019-05-31 DIAGNOSIS — O34.219 PREVIOUS CESAREAN DELIVERY, ANTEPARTUM CONDITION OR COMPLICATION: ICD-10-CM

## 2019-06-05 DIAGNOSIS — Z01.818 PRE-OP EXAM: ICD-10-CM

## 2019-06-05 DIAGNOSIS — O34.219 PREVIOUS CESAREAN DELIVERY, ANTEPARTUM CONDITION OR COMPLICATION: ICD-10-CM

## 2019-06-05 LAB
ABO + RH BLD: NORMAL
ABO + RH BLD: NORMAL
BLD GP AB SCN SERPL QL: NORMAL
BLOOD BANK CMNT PATIENT-IMP: NORMAL
ERYTHROCYTE [DISTWIDTH] IN BLOOD BY AUTOMATED COUNT: 14.1 % (ref 10–15)
HCT VFR BLD AUTO: 34.9 % (ref 35–47)
HGB BLD-MCNC: 11.9 G/DL (ref 11.7–15.7)
MCH RBC QN AUTO: 31.6 PG (ref 26.5–33)
MCHC RBC AUTO-ENTMCNC: 34.1 G/DL (ref 31.5–36.5)
MCV RBC AUTO: 93 FL (ref 78–100)
PLATELET # BLD AUTO: 230 10E9/L (ref 150–450)
RBC # BLD AUTO: 3.77 10E12/L (ref 3.8–5.2)
SPECIMEN EXP DATE BLD: NORMAL
WBC # BLD AUTO: 7.7 10E9/L (ref 4–11)

## 2019-06-05 PROCEDURE — 86780 TREPONEMA PALLIDUM: CPT | Performed by: OBSTETRICS & GYNECOLOGY

## 2019-06-05 PROCEDURE — 36415 COLL VENOUS BLD VENIPUNCTURE: CPT | Performed by: OBSTETRICS & GYNECOLOGY

## 2019-06-05 PROCEDURE — 86850 RBC ANTIBODY SCREEN: CPT | Performed by: OBSTETRICS & GYNECOLOGY

## 2019-06-05 PROCEDURE — 86901 BLOOD TYPING SEROLOGIC RH(D): CPT | Performed by: OBSTETRICS & GYNECOLOGY

## 2019-06-05 PROCEDURE — 86900 BLOOD TYPING SEROLOGIC ABO: CPT | Performed by: OBSTETRICS & GYNECOLOGY

## 2019-06-05 PROCEDURE — 85027 COMPLETE CBC AUTOMATED: CPT | Performed by: OBSTETRICS & GYNECOLOGY

## 2019-06-06 ENCOUNTER — ANESTHESIA EVENT (OUTPATIENT)
Dept: OBGYN | Facility: CLINIC | Age: 24
End: 2019-06-06
Payer: COMMERCIAL

## 2019-06-06 LAB — T PALLIDUM AB SER QL: NONREACTIVE

## 2019-06-07 ENCOUNTER — HOSPITAL ENCOUNTER (INPATIENT)
Facility: CLINIC | Age: 24
LOS: 2 days | Discharge: HOME | End: 2019-06-09
Attending: OBSTETRICS & GYNECOLOGY | Admitting: OBSTETRICS & GYNECOLOGY
Payer: COMMERCIAL

## 2019-06-07 ENCOUNTER — ANESTHESIA (OUTPATIENT)
Dept: OBGYN | Facility: CLINIC | Age: 24
End: 2019-06-07
Payer: COMMERCIAL

## 2019-06-07 DIAGNOSIS — Z98.891 S/P CESAREAN SECTION: Primary | ICD-10-CM

## 2019-06-07 PROBLEM — O34.219 PREVIOUS CESAREAN DELIVERY AFFECTING PREGNANCY: Status: ACTIVE | Noted: 2019-06-07

## 2019-06-07 LAB
AMPHETAMINES UR QL SCN: NEGATIVE
CANNABINOIDS UR QL: NEGATIVE
COCAINE UR QL: NEGATIVE
OPIATES UR QL SCN: NEGATIVE
PCP UR QL SCN: NEGATIVE

## 2019-06-07 PROCEDURE — C1765 ADHESION BARRIER: HCPCS | Performed by: OBSTETRICS & GYNECOLOGY

## 2019-06-07 PROCEDURE — 25000132 ZZH RX MED GY IP 250 OP 250 PS 637: Performed by: OBSTETRICS & GYNECOLOGY

## 2019-06-07 PROCEDURE — 36000059 ZZH SURGERY LEVEL 3 EA 15 ADDTL MIN UMMC: Performed by: OBSTETRICS & GYNECOLOGY

## 2019-06-07 PROCEDURE — 25000128 H RX IP 250 OP 636: Performed by: STUDENT IN AN ORGANIZED HEALTH CARE EDUCATION/TRAINING PROGRAM

## 2019-06-07 PROCEDURE — 27110028 ZZH OR GENERAL SUPPLY NON-STERILE: Performed by: OBSTETRICS & GYNECOLOGY

## 2019-06-07 PROCEDURE — 37000009 ZZH ANESTHESIA TECHNICAL FEE, EACH ADDTL 15 MIN: Performed by: OBSTETRICS & GYNECOLOGY

## 2019-06-07 PROCEDURE — 27210794 ZZH OR GENERAL SUPPLY STERILE: Performed by: OBSTETRICS & GYNECOLOGY

## 2019-06-07 PROCEDURE — 25800030 ZZH RX IP 258 OP 636: Performed by: STUDENT IN AN ORGANIZED HEALTH CARE EDUCATION/TRAINING PROGRAM

## 2019-06-07 PROCEDURE — C9290 INJ, BUPIVACAINE LIPOSOME: HCPCS | Performed by: STUDENT IN AN ORGANIZED HEALTH CARE EDUCATION/TRAINING PROGRAM

## 2019-06-07 PROCEDURE — 25000128 H RX IP 250 OP 636: Performed by: OBSTETRICS & GYNECOLOGY

## 2019-06-07 PROCEDURE — 12000001 ZZH R&B MED SURG/OB UMMC

## 2019-06-07 PROCEDURE — 80307 DRUG TEST PRSMV CHEM ANLYZR: CPT | Performed by: STUDENT IN AN ORGANIZED HEALTH CARE EDUCATION/TRAINING PROGRAM

## 2019-06-07 PROCEDURE — 71000015 ZZH RECOVERY PHASE 1 LEVEL 2 EA ADDTL HR: Performed by: OBSTETRICS & GYNECOLOGY

## 2019-06-07 PROCEDURE — 25000125 ZZHC RX 250

## 2019-06-07 PROCEDURE — 36000057 ZZH SURGERY LEVEL 3 1ST 30 MIN - UMMC: Performed by: OBSTETRICS & GYNECOLOGY

## 2019-06-07 PROCEDURE — 25800030 ZZH RX IP 258 OP 636

## 2019-06-07 PROCEDURE — 40000170 ZZH STATISTIC PRE-PROCEDURE ASSESSMENT II: Performed by: OBSTETRICS & GYNECOLOGY

## 2019-06-07 PROCEDURE — 25000125 ZZHC RX 250: Performed by: STUDENT IN AN ORGANIZED HEALTH CARE EDUCATION/TRAINING PROGRAM

## 2019-06-07 PROCEDURE — 25000132 ZZH RX MED GY IP 250 OP 250 PS 637: Performed by: STUDENT IN AN ORGANIZED HEALTH CARE EDUCATION/TRAINING PROGRAM

## 2019-06-07 PROCEDURE — 25000125 ZZHC RX 250: Performed by: ANESTHESIOLOGY

## 2019-06-07 PROCEDURE — 25800030 ZZH RX IP 258 OP 636: Performed by: OBSTETRICS & GYNECOLOGY

## 2019-06-07 PROCEDURE — 59515 CESAREAN DELIVERY: CPT | Mod: GC | Performed by: OBSTETRICS & GYNECOLOGY

## 2019-06-07 PROCEDURE — 71000014 ZZH RECOVERY PHASE 1 LEVEL 2 FIRST HR: Performed by: OBSTETRICS & GYNECOLOGY

## 2019-06-07 PROCEDURE — 37000008 ZZH ANESTHESIA TECHNICAL FEE, 1ST 30 MIN: Performed by: OBSTETRICS & GYNECOLOGY

## 2019-06-07 RX ORDER — MORPHINE SULFATE 1 MG/ML
150 INJECTION, SOLUTION EPIDURAL; INTRATHECAL; INTRAVENOUS ONCE
Status: COMPLETED | OUTPATIENT
Start: 2019-06-07 | End: 2019-06-07

## 2019-06-07 RX ORDER — NALOXONE HYDROCHLORIDE 0.4 MG/ML
.1-.4 INJECTION, SOLUTION INTRAMUSCULAR; INTRAVENOUS; SUBCUTANEOUS
Status: DISCONTINUED | OUTPATIENT
Start: 2019-06-07 | End: 2019-06-07

## 2019-06-07 RX ORDER — CITRIC ACID/SODIUM CITRATE 334-500MG
30 SOLUTION, ORAL ORAL ONCE
Status: COMPLETED | OUTPATIENT
Start: 2019-06-07 | End: 2019-06-07

## 2019-06-07 RX ORDER — OXYTOCIN/0.9 % SODIUM CHLORIDE 30/500 ML
PLASTIC BAG, INJECTION (ML) INTRAVENOUS
Status: COMPLETED
Start: 2019-06-07 | End: 2019-06-07

## 2019-06-07 RX ORDER — CEFAZOLIN SODIUM 2 G/100ML
2 INJECTION, SOLUTION INTRAVENOUS
Status: COMPLETED | OUTPATIENT
Start: 2019-06-07 | End: 2019-06-07

## 2019-06-07 RX ORDER — ACETAMINOPHEN 325 MG/1
650 TABLET ORAL EVERY 4 HOURS PRN
Status: DISCONTINUED | OUTPATIENT
Start: 2019-06-10 | End: 2019-06-09 | Stop reason: HOSPADM

## 2019-06-07 RX ORDER — KETOROLAC TROMETHAMINE 30 MG/ML
INJECTION, SOLUTION INTRAMUSCULAR; INTRAVENOUS PRN
Status: DISCONTINUED | OUTPATIENT
Start: 2019-06-07 | End: 2019-06-07

## 2019-06-07 RX ORDER — EPHEDRINE SULFATE 50 MG/ML
5 INJECTION, SOLUTION INTRAMUSCULAR; INTRAVENOUS; SUBCUTANEOUS
Status: DISCONTINUED | OUTPATIENT
Start: 2019-06-07 | End: 2019-06-07

## 2019-06-07 RX ORDER — AMOXICILLIN 250 MG
2 CAPSULE ORAL 2 TIMES DAILY PRN
Status: DISCONTINUED | OUTPATIENT
Start: 2019-06-07 | End: 2019-06-09 | Stop reason: HOSPADM

## 2019-06-07 RX ORDER — HYDROCORTISONE 2.5 %
CREAM (GRAM) TOPICAL 3 TIMES DAILY PRN
Status: DISCONTINUED | OUTPATIENT
Start: 2019-06-07 | End: 2019-06-09 | Stop reason: HOSPADM

## 2019-06-07 RX ORDER — OXYTOCIN 10 [USP'U]/ML
10 INJECTION, SOLUTION INTRAMUSCULAR; INTRAVENOUS
Status: DISCONTINUED | OUTPATIENT
Start: 2019-06-07 | End: 2019-06-09 | Stop reason: HOSPADM

## 2019-06-07 RX ORDER — NALOXONE HYDROCHLORIDE 0.4 MG/ML
.1-.4 INJECTION, SOLUTION INTRAMUSCULAR; INTRAVENOUS; SUBCUTANEOUS
Status: DISCONTINUED | OUTPATIENT
Start: 2019-06-07 | End: 2019-06-09 | Stop reason: HOSPADM

## 2019-06-07 RX ORDER — FENTANYL CITRATE 50 UG/ML
25-50 INJECTION, SOLUTION INTRAMUSCULAR; INTRAVENOUS
Status: DISCONTINUED | OUTPATIENT
Start: 2019-06-07 | End: 2019-06-07 | Stop reason: HOSPADM

## 2019-06-07 RX ORDER — DEXTROSE, SODIUM CHLORIDE, SODIUM LACTATE, POTASSIUM CHLORIDE, AND CALCIUM CHLORIDE 5; .6; .31; .03; .02 G/100ML; G/100ML; G/100ML; G/100ML; G/100ML
INJECTION, SOLUTION INTRAVENOUS CONTINUOUS
Status: DISCONTINUED | OUTPATIENT
Start: 2019-06-07 | End: 2019-06-09 | Stop reason: HOSPADM

## 2019-06-07 RX ORDER — KETOROLAC TROMETHAMINE 30 MG/ML
30 INJECTION, SOLUTION INTRAMUSCULAR; INTRAVENOUS EVERY 6 HOURS
Status: DISPENSED | OUTPATIENT
Start: 2019-06-07 | End: 2019-06-08

## 2019-06-07 RX ORDER — FLUMAZENIL 0.1 MG/ML
0.2 INJECTION, SOLUTION INTRAVENOUS
Status: DISCONTINUED | OUTPATIENT
Start: 2019-06-07 | End: 2019-06-07 | Stop reason: HOSPADM

## 2019-06-07 RX ORDER — LIDOCAINE 40 MG/G
CREAM TOPICAL
Status: DISCONTINUED | OUTPATIENT
Start: 2019-06-07 | End: 2019-06-07

## 2019-06-07 RX ORDER — SIMETHICONE 80 MG
80 TABLET,CHEWABLE ORAL 4 TIMES DAILY PRN
Status: DISCONTINUED | OUTPATIENT
Start: 2019-06-07 | End: 2019-06-09 | Stop reason: HOSPADM

## 2019-06-07 RX ORDER — METHYLERGONOVINE MALEATE 0.2 MG/ML
200 INJECTION INTRAVENOUS
Status: DISCONTINUED | OUTPATIENT
Start: 2019-06-07 | End: 2019-06-09 | Stop reason: HOSPADM

## 2019-06-07 RX ORDER — ONDANSETRON 2 MG/ML
4 INJECTION INTRAMUSCULAR; INTRAVENOUS EVERY 6 HOURS PRN
Status: DISCONTINUED | OUTPATIENT
Start: 2019-06-07 | End: 2019-06-09 | Stop reason: HOSPADM

## 2019-06-07 RX ORDER — CEFAZOLIN SODIUM 1 G/3ML
1 INJECTION, POWDER, FOR SOLUTION INTRAMUSCULAR; INTRAVENOUS SEE ADMIN INSTRUCTIONS
Status: DISCONTINUED | OUTPATIENT
Start: 2019-06-07 | End: 2019-06-07 | Stop reason: HOSPADM

## 2019-06-07 RX ORDER — FENTANYL CITRATE 50 UG/ML
25-50 INJECTION, SOLUTION INTRAMUSCULAR; INTRAVENOUS
Status: DISCONTINUED | OUTPATIENT
Start: 2019-06-07 | End: 2019-06-07

## 2019-06-07 RX ORDER — NALBUPHINE HYDROCHLORIDE 10 MG/ML
2.5-5 INJECTION, SOLUTION INTRAMUSCULAR; INTRAVENOUS; SUBCUTANEOUS EVERY 6 HOURS PRN
Status: DISCONTINUED | OUTPATIENT
Start: 2019-06-07 | End: 2019-06-07

## 2019-06-07 RX ORDER — BUPIVACAINE HYDROCHLORIDE AND EPINEPHRINE 2.5; 5 MG/ML; UG/ML
INJECTION, SOLUTION INFILTRATION; PERINEURAL PRN
Status: DISCONTINUED | OUTPATIENT
Start: 2019-06-07 | End: 2019-06-07

## 2019-06-07 RX ORDER — DIMENHYDRINATE 50 MG/ML
25 INJECTION, SOLUTION INTRAMUSCULAR; INTRAVENOUS
Status: DISCONTINUED | OUTPATIENT
Start: 2019-06-07 | End: 2019-06-07 | Stop reason: HOSPADM

## 2019-06-07 RX ORDER — CEFAZOLIN SODIUM 2 G/100ML
INJECTION, SOLUTION INTRAVENOUS
Status: DISCONTINUED
Start: 2019-06-07 | End: 2019-06-07 | Stop reason: HOSPADM

## 2019-06-07 RX ORDER — ONDANSETRON 4 MG/1
4 TABLET, ORALLY DISINTEGRATING ORAL EVERY 30 MIN PRN
Status: DISCONTINUED | OUTPATIENT
Start: 2019-06-07 | End: 2019-06-07 | Stop reason: HOSPADM

## 2019-06-07 RX ORDER — ONDANSETRON 2 MG/ML
4 INJECTION INTRAMUSCULAR; INTRAVENOUS EVERY 30 MIN PRN
Status: DISCONTINUED | OUTPATIENT
Start: 2019-06-07 | End: 2019-06-07 | Stop reason: HOSPADM

## 2019-06-07 RX ORDER — NALOXONE HYDROCHLORIDE 0.4 MG/ML
.1-.4 INJECTION, SOLUTION INTRAMUSCULAR; INTRAVENOUS; SUBCUTANEOUS
Status: ACTIVE | OUTPATIENT
Start: 2019-06-07 | End: 2019-06-08

## 2019-06-07 RX ORDER — OXYCODONE HYDROCHLORIDE 5 MG/1
5-10 TABLET ORAL
Status: DISCONTINUED | OUTPATIENT
Start: 2019-06-07 | End: 2019-06-09 | Stop reason: HOSPADM

## 2019-06-07 RX ORDER — BISACODYL 10 MG
10 SUPPOSITORY, RECTAL RECTAL DAILY PRN
Status: DISCONTINUED | OUTPATIENT
Start: 2019-06-09 | End: 2019-06-09 | Stop reason: HOSPADM

## 2019-06-07 RX ORDER — AMOXICILLIN 250 MG
1 CAPSULE ORAL 2 TIMES DAILY PRN
Status: DISCONTINUED | OUTPATIENT
Start: 2019-06-07 | End: 2019-06-09 | Stop reason: HOSPADM

## 2019-06-07 RX ORDER — ONDANSETRON 2 MG/ML
INJECTION INTRAMUSCULAR; INTRAVENOUS PRN
Status: DISCONTINUED | OUTPATIENT
Start: 2019-06-07 | End: 2019-06-07

## 2019-06-07 RX ORDER — LANOLIN 100 %
OINTMENT (GRAM) TOPICAL
Status: DISCONTINUED | OUTPATIENT
Start: 2019-06-07 | End: 2019-06-09 | Stop reason: HOSPADM

## 2019-06-07 RX ORDER — FENTANYL CITRATE 50 UG/ML
10 INJECTION, SOLUTION INTRAMUSCULAR; INTRAVENOUS ONCE
Status: DISCONTINUED | OUTPATIENT
Start: 2019-06-07 | End: 2019-06-07

## 2019-06-07 RX ORDER — SODIUM CHLORIDE, SODIUM LACTATE, POTASSIUM CHLORIDE, CALCIUM CHLORIDE 600; 310; 30; 20 MG/100ML; MG/100ML; MG/100ML; MG/100ML
INJECTION, SOLUTION INTRAVENOUS CONTINUOUS
Status: DISCONTINUED | OUTPATIENT
Start: 2019-06-07 | End: 2019-06-07 | Stop reason: HOSPADM

## 2019-06-07 RX ORDER — OXYTOCIN/0.9 % SODIUM CHLORIDE 30/500 ML
100 PLASTIC BAG, INJECTION (ML) INTRAVENOUS CONTINUOUS
Status: DISCONTINUED | OUTPATIENT
Start: 2019-06-07 | End: 2019-06-09 | Stop reason: HOSPADM

## 2019-06-07 RX ORDER — OXYTOCIN/0.9 % SODIUM CHLORIDE 30/500 ML
PLASTIC BAG, INJECTION (ML) INTRAVENOUS CONTINUOUS PRN
Status: DISCONTINUED | OUTPATIENT
Start: 2019-06-07 | End: 2019-06-07

## 2019-06-07 RX ORDER — MEPERIDINE HYDROCHLORIDE 25 MG/ML
12.5 INJECTION INTRAMUSCULAR; INTRAVENOUS; SUBCUTANEOUS EVERY 5 MIN PRN
Status: DISCONTINUED | OUTPATIENT
Start: 2019-06-07 | End: 2019-06-07 | Stop reason: HOSPADM

## 2019-06-07 RX ORDER — OXYTOCIN/0.9 % SODIUM CHLORIDE 30/500 ML
340 PLASTIC BAG, INJECTION (ML) INTRAVENOUS CONTINUOUS PRN
Status: DISCONTINUED | OUTPATIENT
Start: 2019-06-07 | End: 2019-06-09 | Stop reason: HOSPADM

## 2019-06-07 RX ORDER — SODIUM CHLORIDE, SODIUM LACTATE, POTASSIUM CHLORIDE, CALCIUM CHLORIDE 600; 310; 30; 20 MG/100ML; MG/100ML; MG/100ML; MG/100ML
INJECTION, SOLUTION INTRAVENOUS
Status: COMPLETED
Start: 2019-06-07 | End: 2019-06-07

## 2019-06-07 RX ORDER — BUPIVACAINE HYDROCHLORIDE 7.5 MG/ML
INJECTION, SOLUTION INTRASPINAL PRN
Status: DISCONTINUED | OUTPATIENT
Start: 2019-06-07 | End: 2019-06-07

## 2019-06-07 RX ORDER — IBUPROFEN 800 MG/1
800 TABLET, FILM COATED ORAL EVERY 6 HOURS PRN
Status: DISCONTINUED | OUTPATIENT
Start: 2019-06-07 | End: 2019-06-09 | Stop reason: HOSPADM

## 2019-06-07 RX ORDER — CITRIC ACID/SODIUM CITRATE 334-500MG
30 SOLUTION, ORAL ORAL
Status: COMPLETED | OUTPATIENT
Start: 2019-06-07 | End: 2019-06-07

## 2019-06-07 RX ORDER — ALBUTEROL SULFATE 90 UG/1
2 AEROSOL, METERED RESPIRATORY (INHALATION) EVERY 6 HOURS
Status: DISCONTINUED | OUTPATIENT
Start: 2019-06-07 | End: 2019-06-09 | Stop reason: HOSPADM

## 2019-06-07 RX ORDER — CARBOPROST TROMETHAMINE 250 UG/ML
250 INJECTION, SOLUTION INTRAMUSCULAR
Status: DISCONTINUED | OUTPATIENT
Start: 2019-06-07 | End: 2019-06-09 | Stop reason: HOSPADM

## 2019-06-07 RX ORDER — MISOPROSTOL 200 UG/1
400 TABLET ORAL
Status: DISCONTINUED | OUTPATIENT
Start: 2019-06-07 | End: 2019-06-09 | Stop reason: HOSPADM

## 2019-06-07 RX ORDER — ACETAMINOPHEN 325 MG/1
975 TABLET ORAL EVERY 8 HOURS
Status: DISCONTINUED | OUTPATIENT
Start: 2019-06-07 | End: 2019-06-09 | Stop reason: HOSPADM

## 2019-06-07 RX ORDER — NALOXONE HYDROCHLORIDE 0.4 MG/ML
.1-.4 INJECTION, SOLUTION INTRAMUSCULAR; INTRAVENOUS; SUBCUTANEOUS
Status: DISCONTINUED | OUTPATIENT
Start: 2019-06-07 | End: 2019-06-07 | Stop reason: HOSPADM

## 2019-06-07 RX ADMIN — KETOROLAC TROMETHAMINE 30 MG: 30 INJECTION, SOLUTION INTRAMUSCULAR at 11:56

## 2019-06-07 RX ADMIN — SODIUM CHLORIDE, POTASSIUM CHLORIDE, SODIUM LACTATE AND CALCIUM CHLORIDE: 600; 310; 30; 20 INJECTION, SOLUTION INTRAVENOUS at 12:02

## 2019-06-07 RX ADMIN — OXYTOCIN-SODIUM CHLORIDE 0.9% IV SOLN 30 UNIT/500ML 100 ML/HR: 30-0.9/5 SOLUTION at 13:08

## 2019-06-07 RX ADMIN — BUPIVACAINE HYDROCHLORIDE IN DEXTROSE 1.6 ML: 7.5 INJECTION, SOLUTION SUBARACHNOID at 10:53

## 2019-06-07 RX ADMIN — FENTANYL CITRATE 10 MCG: 50 INJECTION, SOLUTION INTRAMUSCULAR; INTRAVENOUS at 10:53

## 2019-06-07 RX ADMIN — SODIUM CHLORIDE, POTASSIUM CHLORIDE, SODIUM LACTATE AND CALCIUM CHLORIDE: 600; 310; 30; 20 INJECTION, SOLUTION INTRAVENOUS at 10:30

## 2019-06-07 RX ADMIN — SODIUM CHLORIDE, POTASSIUM CHLORIDE, SODIUM LACTATE AND CALCIUM CHLORIDE 1000 ML: 600; 310; 30; 20 INJECTION, SOLUTION INTRAVENOUS at 10:16

## 2019-06-07 RX ADMIN — OXYTOCIN-SODIUM CHLORIDE 0.9% IV SOLN 30 UNIT/500ML 100 ML/HR: 30-0.9/5 SOLUTION at 12:02

## 2019-06-07 RX ADMIN — CEFAZOLIN SODIUM 2 G: 2 INJECTION, SOLUTION INTRAVENOUS at 10:48

## 2019-06-07 RX ADMIN — SODIUM CHLORIDE, SODIUM LACTATE, POTASSIUM CHLORIDE, CALCIUM CHLORIDE AND DEXTROSE MONOHYDRATE: 5; 600; 310; 30; 20 INJECTION, SOLUTION INTRAVENOUS at 16:49

## 2019-06-07 RX ADMIN — BUPIVACAINE 20 ML: 13.3 INJECTION, SUSPENSION, LIPOSOMAL INFILTRATION at 13:45

## 2019-06-07 RX ADMIN — SODIUM CITRATE AND CITRIC ACID MONOHYDRATE 30 ML: 500; 334 SOLUTION ORAL at 10:19

## 2019-06-07 RX ADMIN — SODIUM CITRATE AND CITRIC ACID MONOHYDRATE 30 ML: 500; 334 SOLUTION ORAL at 10:25

## 2019-06-07 RX ADMIN — BUPIVACAINE HYDROCHLORIDE AND EPINEPHRINE BITARTRATE 20 ML: 2.5; .005 INJECTION, SOLUTION INFILTRATION; PERINEURAL at 13:45

## 2019-06-07 RX ADMIN — ACETAMINOPHEN 975 MG: 325 TABLET, FILM COATED ORAL at 23:34

## 2019-06-07 RX ADMIN — PHENYLEPHRINE HYDROCHLORIDE 0.5 MCG/KG/MIN: 10 INJECTION INTRAVENOUS at 10:53

## 2019-06-07 RX ADMIN — OXYTOCIN-SODIUM CHLORIDE 0.9% IV SOLN 30 UNIT/500ML 300 ML/HR: 30-0.9/5 SOLUTION at 11:19

## 2019-06-07 RX ADMIN — KETOROLAC TROMETHAMINE 30 MG: 30 INJECTION, SOLUTION INTRAMUSCULAR at 19:10

## 2019-06-07 RX ADMIN — ACETAMINOPHEN 975 MG: 325 TABLET, FILM COATED ORAL at 16:48

## 2019-06-07 RX ADMIN — MORPHINE SULFATE 0.15 MG: 1 INJECTION, SOLUTION EPIDURAL; INTRATHECAL; INTRAVENOUS at 10:53

## 2019-06-07 RX ADMIN — ONDANSETRON 4 MG: 2 INJECTION INTRAMUSCULAR; INTRAVENOUS at 11:19

## 2019-06-07 ASSESSMENT — ACTIVITIES OF DAILY LIVING (ADL)
TOILETING: 0-->INDEPENDENT
SWALLOWING: 0-->SWALLOWS FOODS/LIQUIDS WITHOUT DIFFICULTY
BATHING: 0-->INDEPENDENT
RETIRED_EATING: 0-->INDEPENDENT
TRANSFERRING: 0-->INDEPENDENT
COGNITION: 0 - NO COGNITION ISSUES REPORTED
FALL_HISTORY_WITHIN_LAST_SIX_MONTHS: NO
AMBULATION: 0-->INDEPENDENT
DRESS: 0-->INDEPENDENT
RETIRED_COMMUNICATION: 0-->UNDERSTANDS/COMMUNICATES WITHOUT DIFFICULTY
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
## 2019-06-07 NOTE — PLAN OF CARE
Data: Rajan Graham transferred to [place] [address] via cart at 1445. Baby transferred via parent's arms.  Action: Receiving unit notified of transfer: Yes. Patient and family notified of room change. Report given to Vicofelia ZAKI at 1540. Belongings brought to receiving unit. Accompanied by Registered Nurse. Oriented patient to surroundings. Call light within reach. ID bands double-checked with Trixie REHMAN RN.  Response: Patient tolerated transfer and is stable.

## 2019-06-07 NOTE — DISCHARGE SUMMARY
DELIVERY DISCHARGE SUMMARY    Rajan Graham  : 1995  MRN: [id number]    Admit date: 2019  Discharge date: 2019    Admit Dx:   -  at 39w0d  - H/o prior  section x1  - Intrauterine growth restriction    Discharge Dx:  - Same as above, s/p repeat low transverse  section    Procedures:  - Repeat low transverse  section with double layer closure via Pfannenstiel incision  - Spinal analgesia    Admit HPI:  Ms. Rajan Graham is a 24 year old  at 39w0d who was admitted on 2019 for a scheduled repeat  section.   Please see her admit H&P for full details of her PMH, PSH, Meds, Allergies and exam on admit.    Hospital course:  After discussion of risk and and benefits and signing informed consent the patient was taken to the operating room for repeat  section.    Operative Findings:  - Living male infant, cephalic presentation, Apgars 9 and 9 at 1 and 5 minutes, weight 2450g.  - Normal uterus, tubes, and ovaries   - Normal appearing placenta, 3VC, clear amniotic fluid  - Moderate subcutaneous adhesions, minimal rectofascial adhesions, several filmy intraabdominal adhesions. Bladder adherent to lower uterine segment, but able to be dissected off without difficulty.    EBL from the delivery was 550mL. Please see her  Section Operative Note for full details regarding her delivery.    Her postoperative course was uncomplicated. On POD#2, she was meeting all of her postpartum goals and deemed stable for discharge. She was voiding without difficulty, tolerating a regular diet without nausea and vomiting, her pain was well controlled on oral pain medicines and her lochia was appropriate. Her hemoglobin prior to delivery was 11.9 and after delivery was 10.8. Her Rh status was positive and Rhogam was not indicated.      Discharge Medications:  Current Discharge Medication List      START taking these medications    Details    acetaminophen (TYLENOL) 325 MG tablet Take 2 tablets (650 mg) by mouth every 6 hours as needed for mild pain Start after Delivery.  Qty: 100 tablet, Refills: 0    Associated Diagnoses: S/P  section      ibuprofen (ADVIL/MOTRIN) 600 MG tablet Take 1 tablet (600 mg) by mouth every 6 hours as needed for moderate pain Start after delivery  Qty: 60 tablet, Refills: 0    Associated Diagnoses: S/P  section      senna-docusate (SENOKOT-S/PERICOLACE) 8.6-50 MG tablet Take 1 tablet by mouth daily Start after delivery.  Qty: 100 tablet, Refills: 0    Associated Diagnoses: S/P  section         CONTINUE these medications which have NOT CHANGED    Details   albuterol (PROAIR HFA, PROVENTIL HFA, VENTOLIN HFA) 108 (90 BASE) MCG/ACT inhaler Inhale 2 puffs into the lungs every 6 hours  Qty: 18 g, Refills: 2    Associated Diagnoses: Asthma with allergic rhinitis      docusate sodium (COLACE) 50 MG capsule Take 1 capsule (50 mg) by mouth 2 times daily as needed for constipation  Qty: 60 capsule, Refills: 1    Associated Diagnoses: Constipation, unspecified constipation type      metoclopramide (REGLAN) 10 MG tablet Take 1 tablet (10 mg) by mouth 4 times daily (before meals and nightly)  Qty: 60 tablet, Refills: 1    Associated Diagnoses: Hyperemesis gravidarum             Discharge/Disposition:  Rajan Graham was discharged to home in stable condition with the following instructions/medications:  1) Call for temperature > 100.4, bright red vaginal bleeding >1 pad an hour x 2 hours, foul smelling vaginal discharge, pain not controlled by usual oral pain meds, persistent nausea and vomiting not controlled on medications, drainage or redness from incision site  2) She was undecided about contraception, options were discussed.  3) For feeding she decided to breastfeed and supplement w/ formula.  4) She was instructed to follow-up with her primary OB in 6 weeks for a routine postpartum visit.  5)  Discharge activity:  No heavy lifting >15 lbs or strenuous activity for 6 weeks, pelvic rest for 6 weeks, no driving or operating machinery while on narcotics.      Dacia Sutton MD  Ob/Gyn, PGY4

## 2019-06-07 NOTE — OP NOTE
[facility]  Obstetrics & Gynecology   Operative Note    Rajan Graham  : 1995  MRN: [id number]    DATE OF SERVICE: 2019    PREOPERATIVE DIAGNOSIS:   - 39w0d pregnancy  - H/o prior  section x1  - Intrauterine growth restriction    POSTOPERATIVE DIAGNOSIS:   Same as above s/p procedure below and delivery of viable male infant     PROCEDURE: Repeat low transverse  delivery with double layer closure via Pfannenstiel incision    SURGEON: Raisa Martínez MD  ASSISTANT: Jessica Aguilar MD PGY2  ANESTHESIA: Spinal    ESTIMATED BLOOD LOSS: 550 mL  IV FLUIDS: 400 mL, crystalloid  URINE OUTPUT: 150 mL, clear urine    SPECIMENS: Cord blood  DRAINS: Pfeiffer catheter  COMPLICATIONS: None apparent  CONDITION: Stable, transferred to PACU    INDICATIONS: Rajan Graham is a 24 year old at 39w0d who presents to labor and delivery for a scheduled  section.  Cervix was closed so not a candidate for induction of labor.  Risks, benefits and alternatives were discussed. Questions and concerns were addressed and informed consent obtained.     FINDINGS:   - Living male infant, cephalic presentation, Apgars 9 and 9 at 1 and 5 minutes, weight 2450g.  - Normal uterus, tubes, and ovaries   - Normal appearing placenta, 3VC, clear amniotic fluid  - Moderate subcutaneous adhesions, minimal rectofascial adhesions, several filmy intraabdominal adhesions. Bladder adherent to lower uterine segment, but able to be dissected off without difficulty.    PROCEDURE: The patient was taken to the operating room. Regional anesthesia was placed and found to be adequate. She was positioned supine. She was prepped and draped in the usual sterile fashion. A time out was performed. SCDs were placed and antibiotic prophylaxis (2g Ancef) was given. The skin was then opened in the Pfannenstiel incision, carried down sharply to the fascia.  The fascia was scored with the scalpel and  opened laterally with the Ordaz scissors.  Fascia was sharply and bluntly dissected off of the rectus muscles.  The rectus muscles were then  in the midline and the peritoneum entered bluntly.  This was extended with blunt dissection and a bladder retractor placed. A transverse incision was made in the lower uterine segment until just before membranes were encountered and the uterus was entered bluntly. Amniotomy was performed with clear fluid.  The incision was extended bluntly. The infant was delivered through the incision without difficulty onto the table where it was well suctioned, the cord double clamped and cut, and handed off to waiting pediatric staff.     The placenta was then expressed, the uterus exteriorized and swept of all clots, debris, and trailing membranes with a dry laparotomy sponge.  The incision was closed with a running locked layer of 0 Monocryl, and an imbricating layer of 0 Monocryl in a running fashion over the top.  Excellent hemostasis was noted. The Galvan tip suction was used to irrigate and suction the posterior culdesac to remove clots and debris.  The uterus was then returned to the abdomen.  The gutters were similarly suctioned dry.  Hemostasis at the uterine incision was ensured one last time. One piece of Seprafilm was placed over the hysterotomy and a second piece over the rectus muscles.  The fascia was closed using a running 0 Vicyl stitch. Skin was closed using a running 4-0 Monocryl stitch. The incision was covered with dermabond. Instrument and lap counts were correct x2.  Patient tolerated the procedure well and was taken to recovery in stable condition.      Dr. Martínez was present and scrubbed for the entire procedure.    Jessica Aguilar MD PGY2  Obstetrics & Gynecology  2:43 PM 06/07/2019     I was present and scrubbed for entirety of the surgical case and  agree with note as edited to reflect findings.      YONI MARTÍNEZ

## 2019-06-07 NOTE — ANESTHESIA CARE TRANSFER NOTE
Patient: Rajan Graham    Procedure(s):  Repeat  Section    Diagnosis: History Of Prior Cesaean Section, Intrauterine Growth Restriction  Diagnosis Additional Information: No value filed.    Anesthesia Type:   No value filed.     Note:  Airway :Room Air  Patient transferred to:Labor and Delivery  Handoff Report: Identifed the Patient, Identified the Reponsible Provider, Reviewed the pertinent medical history, Discussed the surgical course, Reviewed Intra-OP anesthesia mangement and issues during anesthesia, Set expectations for post-procedure period and Allowed opportunity for questions and acknowledgement of understanding      Vitals: (Last set prior to Anesthesia Care Transfer)    CRNA VITALS  2019 1132 - 2019 1207      2019             Pulse:  77    SpO2:  100 %    Resp Rate (observed):  16                Electronically Signed By: JENIFFER Hernandez CRNA  2019  12:07 PM

## 2019-06-07 NOTE — ANESTHESIA PROCEDURE NOTES
Peripheral Nerve Block Procedure Note    Staff:     Anesthesiologist:  Tapan Kendall MD  Location: PACU  Procedure Start/Stop TImes:      6/7/2019 1:34 PM     6/7/2019 1:42 PM    patient identified, IV checked, site marked, risks and benefits discussed, informed consent, monitors and equipment checked, pre-op evaluation, at physician/surgeon's request and post-op pain management      Correct Patient: Yes      Correct Position: Yes      Correct Site: Yes      Correct Procedure: Yes      Correct Laterality:  N/A    Site Marked:  N/A  Procedure details:     Procedure:  TAP    ASA:  2    Diagnosis:  Post C section pain management    Laterality:  Bilateral    Position:  Supine    Sterile Prep: chloraprep      Local skin infiltration:  None    Needle:  Short bevel    Needle gauge:  22    Needle length (inches):  3.13    Ultrasound: Yes      Ultrasound used to identify targeted nerve, plexus, or vascular structure and placed a needle adjacent to it      Permanent Image entered into patiient's record      Abnormal pain on injection: No      Blood Aspirated: No      Paresthesias:  No    Bleeding at site: No      Bolus via:  Needle    Infusion Method:  Single Shot    Complications:  None

## 2019-06-07 NOTE — ANESTHESIA PROCEDURE NOTES
Spinal/LP Procedure Note    Spinal Block  Staff:     Anesthesiologist:  Cynthia Smith MD    Resident/CRNA:  Catarina Rg MD    Spinal/LP performed by resident/CRNA in presence of a teaching physician.    Location: In OR BEFORE Induction  Procedure Start/Stop Times:     patient identified, IV checked, site marked, risks and benefits discussed, informed consent, monitors and equipment checked, pre-op evaluation, at physician/surgeon's request and post-op pain management      Correct Patient: Yes      Correct Position: Yes      Correct Site: Yes      Correct Procedure: Yes      Correct Laterality:  Yes    Site Marked:  N/A  Procedure:     Procedure:  Intrathecal    ASA:  2    Diagnosis:  Repeat C section, single pregnancy    Position:  Sitting    Sterile Prep: chloraprep      Insertion site:  L3-4    Approach:  Midline    Needle Type:  Yoanna    Needle gauge (G):  25    Local Skin Infiltration:  1% lidocaine    amount (ml):  3    Needle Length (in):  3.5    Introducer used: Yes      Introducer gauge:  20 G    Attempts:  2    Redirects:  0    CSF:  Clear    Paresthesias:  No

## 2019-06-07 NOTE — BRIEF OP NOTE
[facility] OB/GYN Brief Operative Note    Pre-operative diagnosis: H/o prior CS  IUGR   Post-operative diagnosis: Same s/p procedure   Procedure: Procedure(s):  Repeat  Section     Surgeon: Dr. Raisa Martínez MD   Assistant(s): Jessica Aguilar MD PGY2      Anesthesia: Spinal Anesthesia     Estimated blood loss: 550mL   Total IV fluids: 400 mL, crystalloid   Blood transfusion: No transfusion was given during surgery   Total urine output: 150mL, clear urine   Drains: Pfeiffer catheter   Specimens: Cord blood       Complications: None apparent    Condition: Patient taken to recovery in stable condition.     Findings: Huertas male infant delivered from cephalic presentation, Apgars 9 and 9 at 1 and 5 minutes, weight 2450g.  Normal appearing placenta, 3VC, clear amniotic fluid. Normal appearing uterus, fallopian tubes and ovaries. Moderate subcutaneous adhesions, minimal rectofascial adhesions, several filmy intraabdominal adhesions.  Bladder adherent to IMMANUEL, but able to be dissected off without difficulty.      Jessica Aguilar MD   2019 12:07 PM   OB/GYN Resident PGY2

## 2019-06-08 LAB — HGB BLD-MCNC: 10.8 G/DL (ref 11.7–15.7)

## 2019-06-08 PROCEDURE — 25000132 ZZH RX MED GY IP 250 OP 250 PS 637: Performed by: STUDENT IN AN ORGANIZED HEALTH CARE EDUCATION/TRAINING PROGRAM

## 2019-06-08 PROCEDURE — 85018 HEMOGLOBIN: CPT | Performed by: STUDENT IN AN ORGANIZED HEALTH CARE EDUCATION/TRAINING PROGRAM

## 2019-06-08 PROCEDURE — 36415 COLL VENOUS BLD VENIPUNCTURE: CPT | Performed by: STUDENT IN AN ORGANIZED HEALTH CARE EDUCATION/TRAINING PROGRAM

## 2019-06-08 PROCEDURE — 12000001 ZZH R&B MED SURG/OB UMMC

## 2019-06-08 RX ADMIN — ACETAMINOPHEN 975 MG: 325 TABLET, FILM COATED ORAL at 14:55

## 2019-06-08 RX ADMIN — IBUPROFEN 800 MG: 800 TABLET, FILM COATED ORAL at 01:18

## 2019-06-08 RX ADMIN — OXYCODONE HYDROCHLORIDE 5 MG: 5 TABLET ORAL at 14:55

## 2019-06-08 RX ADMIN — IBUPROFEN 800 MG: 800 TABLET, FILM COATED ORAL at 20:39

## 2019-06-08 RX ADMIN — ACETAMINOPHEN 975 MG: 325 TABLET, FILM COATED ORAL at 08:01

## 2019-06-08 RX ADMIN — IBUPROFEN 800 MG: 800 TABLET, FILM COATED ORAL at 13:12

## 2019-06-08 RX ADMIN — OXYCODONE HYDROCHLORIDE 5 MG: 5 TABLET ORAL at 22:18

## 2019-06-08 RX ADMIN — IBUPROFEN 800 MG: 800 TABLET, FILM COATED ORAL at 06:53

## 2019-06-08 RX ADMIN — SENNOSIDES AND DOCUSATE SODIUM 2 TABLET: 8.6; 5 TABLET ORAL at 20:39

## 2019-06-08 RX ADMIN — OXYCODONE HYDROCHLORIDE 5 MG: 5 TABLET ORAL at 18:25
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
## 2019-06-08 NOTE — PROGRESS NOTES
[facility]  Postpartum Note    Name:  Rajan Graham  MRN: [id number]    POD#1 s/p RLTCS    S: Feeling well overall.  Pain well controlled.  Tolerating regular diet without n/v.  Ambulating without dizziness.  Not yet voiding spontaneously, not yet passing flatus, no bowel movement. Lochia similar to menstrual flow.  Breast feeding.  Undecided re contraception.  Plans discharge tmr maybe.    O:   Patient Vitals for the past 12 hrs:   BP Temp Temp src Pulse Heart Rate Resp SpO2   // 0800 115/65 98.1  F (36.7  C) Oral -- 70 16 --   06// 0652 128/79 -- -- -- 73 16 --   // 0322 121/62 98.4  F (36.9  C) Oral -- 71 16 --   // 2329 124/62 -- Oral 74 -- 16 99 %     Gen:  Resting comfortably, NAD  CV:  Regular rate and rhythm   Pulm:  Non-labored breathing. No cough or wheezing.   Abd:  Soft, appropriately tender to palpation, non-distended.  Fundus at the umbilicus, firm and non-tender.  Incision: c/d/i with overlying steristrips, no surrounding erythema or edema  Ext:  Non-tender, trace LE edema b/l    Hgb:   Recent Labs   Lab Test 06//  1112   HGB 11.9       Assessment/Plan:  24 year old  on POD #1 s/p RLTCS, doing well.  Continue with routine postpartum management.     Pain: Well-controlled with sched tylenol, toradol, PRN oxy  Hgb: 11.9>> AM pending. VSS as noted above, asymptomatic.   GI:  BID Senna/Colace.  PRN Simethicone.   PPx:  Encouraged ambulation   Rh: Positive  Rubella: Immune  Feed: breastfeeding  BC: Undecided, discussed ptions  Dispo: Plan for home on POD#2-3.     Vicky Zamora MD   OB/Gyn Resident, PGY-3

## 2019-06-08 NOTE — PLAN OF CARE
Pt Vitals stable. Good pain control with Toradol. Incision C/D/I. Previous tingling to bilateral LE but is now resolved. Pfeiffer intact. Lungs CTA - refusing inhaler for now. Plan is increase activity in the next hour. Will continue to monitor pain and movement.

## 2019-06-09 VITALS
SYSTOLIC BLOOD PRESSURE: 125 MMHG | HEART RATE: 81 BPM | DIASTOLIC BLOOD PRESSURE: 57 MMHG | TEMPERATURE: 98.3 F | RESPIRATION RATE: 16 BRPM | OXYGEN SATURATION: 100 %

## 2019-06-09 PROCEDURE — 25000132 ZZH RX MED GY IP 250 OP 250 PS 637: Performed by: STUDENT IN AN ORGANIZED HEALTH CARE EDUCATION/TRAINING PROGRAM

## 2019-06-09 RX ORDER — AMOXICILLIN 250 MG
1 CAPSULE ORAL DAILY
Qty: 100 TABLET | Refills: 0 | Status: ON HOLD | OUTPATIENT
Start: 2019-06-09 | End: 2021-02-25

## 2019-06-09 RX ORDER — ACETAMINOPHEN 325 MG/1
650 TABLET ORAL EVERY 6 HOURS PRN
Qty: 100 TABLET | Refills: 0 | Status: SHIPPED | OUTPATIENT
Start: 2019-06-09 | End: 2019-12-08

## 2019-06-09 RX ORDER — OXYCODONE HYDROCHLORIDE 5 MG/1
5 TABLET ORAL EVERY 6 HOURS PRN
Qty: 10 TABLET | Refills: 0 | Status: SHIPPED | OUTPATIENT
Start: 2019-06-09 | End: 2019-06-09

## 2019-06-09 RX ORDER — ACETAMINOPHEN 325 MG/1
650 TABLET ORAL EVERY 6 HOURS PRN
Qty: 100 TABLET | Refills: 0 | Status: SHIPPED | OUTPATIENT
Start: 2019-06-09 | End: 2019-06-09

## 2019-06-09 RX ORDER — IBUPROFEN 600 MG/1
600 TABLET, FILM COATED ORAL EVERY 6 HOURS PRN
Qty: 60 TABLET | Refills: 0 | Status: ON HOLD | OUTPATIENT
Start: 2019-06-09 | End: 2021-02-25

## 2019-06-09 RX ORDER — OXYCODONE HYDROCHLORIDE 5 MG/1
5 TABLET ORAL EVERY 6 HOURS PRN
Qty: 10 TABLET | Refills: 0 | Status: SHIPPED | OUTPATIENT
Start: 2019-06-09 | End: 2021-01-13

## 2019-06-09 RX ORDER — AMOXICILLIN 250 MG
1 CAPSULE ORAL DAILY
Qty: 100 TABLET | Refills: 0 | Status: SHIPPED | OUTPATIENT
Start: 2019-06-09 | End: 2019-06-09

## 2019-06-09 RX ORDER — IBUPROFEN 600 MG/1
600 TABLET, FILM COATED ORAL EVERY 6 HOURS PRN
Qty: 60 TABLET | Refills: 0 | Status: SHIPPED | OUTPATIENT
Start: 2019-06-09 | End: 2019-06-09

## 2019-06-09 RX ADMIN — ACETAMINOPHEN 975 MG: 325 TABLET, FILM COATED ORAL at 08:07

## 2019-06-09 RX ADMIN — ACETAMINOPHEN 975 MG: 325 TABLET, FILM COATED ORAL at 00:09

## 2019-06-09 RX ADMIN — IBUPROFEN 800 MG: 800 TABLET, FILM COATED ORAL at 03:01

## 2019-06-09 RX ADMIN — SENNOSIDES AND DOCUSATE SODIUM 2 TABLET: 8.6; 5 TABLET ORAL at 08:07

## 2019-06-09 RX ADMIN — OXYCODONE HYDROCHLORIDE 5 MG: 5 TABLET ORAL at 01:30

## 2019-06-09 RX ADMIN — IBUPROFEN 800 MG: 800 TABLET, FILM COATED ORAL at 08:27

## 2019-06-09 NOTE — PLAN OF CARE
Postpartum discharge instructions and medications reviewed and pt verbalized understanding. Received breast pump. Independent with self and baby cares. Breastfeeding  without issue. Reviewed concerns and questions. Mother and baby discharged to home. 2019@ [id number]

## 2019-06-09 NOTE — PROGRESS NOTES
[facility]  Postpartum Note    Name:  Rajan Graham  MRN: [id number]    POD#2 s/p RLTCS    S: Feeling well overall.  Pain well controlled.  Tolerating regular diet without n/v.  Ambulating without dizziness. Voiding and passing flatus. Lochia similar to menstrual flow.  Breast feeding and supplemented with formula.  Undecided re contraception.  Hoping to go home today.     O:   Patient Vitals for the past 12 hrs:   BP Temp Temp src Heart Rate Resp SpO2   19 0130 126/77 98.1  F (36.7  C) Oral 79 16 100 %     Gen:  Resting comfortably, NAD  CV:  Regular rate and rhythm   Pulm:  Non-labored breathing. No cough or wheezing.   Abd:  Soft, appropriately tender to palpation, non-distended.  Fundus at the umbilicus, firm and non-tender.  Incision: c/d/i, no surrounding erythema or edema  Ext:  Non-tender, trace LE edema b/l    Assessment/Plan:  24 year old  on POD #2 s/p RLTCS, doing well.  Continue with routine postpartum management.     Pain: Well-controlled with sched tylenol, ibuprofen, PRN oxy  Hgb: 11.9>> 10.8 VSS as noted above, asymptomatic.   GI:  BID Senna/Colace.  PRN Simethicone.   PPx:  Encouraged ambulation   EPDS of 16, has been seen by SW and discussed resources. Has good family support.   Rh: Positive  Rubella: Immune  Feed: Breastfeeding and formula supplementing  BC: Undecided, discussed options  Dispo: Plan for home today.    Dacia Sutton MD  Ob/Gyn, PGY4    Attestation:   This patient was seen and evaluated by me, separately from the house staff team. I have reviewed the note/plan above and agree.     Doing well and ready to go home today. Using oxy occasionally so script for #10 tablets given. Discharge instructions reviewed and discussed RTC 8 weeks if planning IUD, otherwise could do 6 wks and discussed BC options with CNM. Questions answered.     Raisa Martínez MD

## 2019-06-09 NOTE — PLAN OF CARE
Patient A&O times 4; VSS; LS clear and BS+; c/o pain to lower abdomen.  Ibuprofen and Oxycodone given for pain control; Senakot given; patient refused Albuterol inhaler; encouraged to ambulate; minimal assist with breast feeding and latch.  FFU1 with scant flow; abdominal incision CD&I and SHAD; good appetite; up ambulating in room; multiple visitors during shift.

## 2019-06-09 NOTE — PLAN OF CARE
Patient A&O time 4; VSS; LS clear and BS+; c/o soreness to lower abdomen.  Oxycodone, Tylenol and Ibuprofen given for pain control; independent with breastfeeding, infant latch and formula supplement.  FFU1 with scant flow; good PO fluid intake, up ambulating independently and voiding good amounts.  Continue to assess pain.

## 2019-06-10 NOTE — ANESTHESIA POSTPROCEDURE EVALUATION
Anesthesia POST Procedure Evaluation    Patient: Rajan Graham   MRN:     [id number] Gender:   female   Age:    24 year old :      1995        Preoperative Diagnosis: History Of Prior Cesaean Section, Intrauterine Growth Restriction   Procedure(s):  Repeat  Section   Postop Comments: No value filed.       Anesthesia Type:  Regional  No value filed.    Reportable Event: NO     PAIN: Uncomplicated   Sign Out status: Comfortable, Well controlled pain     PONV: No PONV   Sign Out status:  No Nausea or Vomiting     Neuro/Psych: Uneventful perioperative course   Sign Out Status: Preoperative baseline; Age appropriate mentation     Airway/Resp.: Uneventful perioperative course   Sign Out Status: Non labored breathing, age appropriate RR; Resp. Status within EXPECTED Parameters     CV: Uneventful perioperative course   Sign Out status: Appropriate BP and perfusion indices; Appropriate HR/Rhythm     Disposition:   Sign Out in:  PACU  Disposition:  Labor and Delivery  Recovery Course: Uneventful  Follow-Up: Not required           Last Anesthesia Record Vitals:  CRNA VITALS  2019 1132 - 2019 1232      2019             Pulse:  77    SpO2:  100 %    Resp Rate (observed):  16          Last PACU Vitals:  Vitals Value Taken Time   /57 2019  9:00 AM   Temp 36.8  C (98.3  F) 2019  9:00 AM   Pulse 81 2019  9:00 AM   Resp 16 2019  9:00 AM   SpO2 100 % 2019  1:30 AM   Temp src     NIBP     Pulse     SpO2     Resp     Temp     Ht Rate     Temp 2           Electronically Signed By: Cynthia Smith MD, Yadira 10, 2019, 8:34 AM

## 2019-12-08 ENCOUNTER — NURSE TRIAGE (OUTPATIENT)
Dept: NURSING | Facility: CLINIC | Age: 24
End: 2019-12-08

## 2019-12-08 ENCOUNTER — HOSPITAL ENCOUNTER (EMERGENCY)
Facility: CLINIC | Age: 24
Discharge: HOME | End: 2019-12-08
Attending: EMERGENCY MEDICINE | Admitting: EMERGENCY MEDICINE
Payer: COMMERCIAL

## 2019-12-08 VITALS
DIASTOLIC BLOOD PRESSURE: 86 MMHG | RESPIRATION RATE: 16 BRPM | TEMPERATURE: 98.4 F | OXYGEN SATURATION: 100 % | WEIGHT: 169 LBS | HEART RATE: 75 BPM | BODY MASS INDEX: 28.12 KG/M2 | SYSTOLIC BLOOD PRESSURE: 136 MMHG

## 2019-12-08 DIAGNOSIS — K08.89 PAIN, DENTAL: ICD-10-CM

## 2019-12-08 PROCEDURE — 99283 EMERGENCY DEPT VISIT LOW MDM: CPT

## 2019-12-08 PROCEDURE — 25000132 ZZH RX MED GY IP 250 OP 250 PS 637: Performed by: EMERGENCY MEDICINE

## 2019-12-08 PROCEDURE — 99284 EMERGENCY DEPT VISIT MOD MDM: CPT | Mod: Z6 | Performed by: EMERGENCY MEDICINE

## 2019-12-08 RX ORDER — ACETAMINOPHEN 500 MG
1000 TABLET ORAL ONCE
Status: COMPLETED | OUTPATIENT
Start: 2019-12-08 | End: 2019-12-08

## 2019-12-08 RX ORDER — PENICILLIN V POTASSIUM 500 MG/1
500 TABLET, FILM COATED ORAL 4 TIMES DAILY
Qty: 40 TABLET | Refills: 0 | Status: SHIPPED | OUTPATIENT
Start: 2019-12-08 | End: 2019-12-18

## 2019-12-08 RX ORDER — ACETAMINOPHEN 500 MG
500-1000 TABLET ORAL EVERY 6 HOURS PRN
Qty: 30 TABLET | Refills: 0 | Status: ON HOLD | OUTPATIENT
Start: 2019-12-08 | End: 2021-02-25

## 2019-12-08 RX ADMIN — ACETAMINOPHEN 1000 MG: 500 TABLET ORAL at 08:59

## 2019-12-08 ASSESSMENT — ENCOUNTER SYMPTOMS
DYSURIA: 0
ABDOMINAL PAIN: 0
FEVER: 0
COUGH: 0
VOMITING: 1
SHORTNESS OF BREATH: 0
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
## 2019-12-08 NOTE — ED AVS SNAPSHOT
[facility], [place], Emergency Department  [address]  [facility] [place] [address]  Phone:  [phone]  Fax:  [phone]                                    Rajan Graham   MRN: [id number]    Department:  [facility], Emergency Department   Date of Visit:  12/8/2019           After Visit Summary Signature Page    I have received my discharge instructions, and my questions have been answered. I have discussed any challenges I see with this plan with the nurse or doctor.    ..........................................................................................................................................  Patient/Patient Representative Signature      ..........................................................................................................................................  Patient Representative Print Name and Relationship to Patient    ..................................................               ................................................  Date                                   Time    ..........................................................................................................................................  Reviewed by Signature/Title    ...................................................              ..............................................  Date                                               Time          22EPIC Rev 08/18

## 2019-12-08 NOTE — DISCHARGE INSTRUCTIONS
"    *DENTAL PAIN    A crack or cavity in the tooth, which exposes the sensitive inner area of the tooth can cause tooth pain. An infection in the gum or the root of the tooth can cause pain and swelling. The pain is often made worse by drinking hot or cold fluids, or biting on hard foods. Pain may spread from the tooth to the ear or jaw on the same side.  HOME CARE:  Avoid hot and cold foods and liquids since your tooth may be sensitive to temperature changes.  If your tooth is chipped or cracked, or if there is a large open cavity, apply OIL OF CLOVES (available over-the-counter in drug stores) directly to the tooth to reduce pain. Some pharmacies carry an over-the-counter \"toothache kit.\" This contains a paste, which can be applied over the exposed tooth to decrease sensitivity. This is only a temporary solution. See a dentist as soon as possible to fix the tooth.  A cold pack on your jaw over the sore area may help reduce pain.  You may use acetaminophen (Tylenol) 650-1000 mg every 6 hours or ibuprofen (Motrin, Advil) 600 mg every 6-8 hours with food to control pain, if you are able to take these medicines. [ NOTE: If you have chronic liver or kidney disease or ever had a stomach ulcer or GI bleeding, talk with your doctor before using these medicines.]  If you have signs of an infection, an antibiotic will be given. Take it as directed.  FOLLOW-UP as directed with a dentist. Your pain may go away with the treatment given. However, only a dentist can fully evaluate and treat the cause and prevent the pain from coming back again.  TOOTHACHE IS A SIGN OF DISEASE IN YOUR TOOTH AND SHOULD BE EXAMINED AND TREATED BY A DENTIST.  GET PROMPT MEDICAL ATTENTION if any of the following occur:  Your face becomes swollen or red  Pain worsens or spreads to the neck  Fever over 101  F (38.3  C)  Unusual drowsiness; headache or stiff neck; weakness or fainting  Pus drains from the tooth  Difficulty swallowing or breathing    " 1845-5773 [employer] [facility], [address] [place] [address], [place] [address]. All rights reserved. This information is not intended as a substitute for professional medical care. Always follow your healthcare professional's instructions.    Please make an appointment to follow up with your dentist at [facility] as soon as possible; call tomorrow morning for an appointment.

## 2019-12-08 NOTE — TELEPHONE ENCOUNTER
"\"I am having really bad tooth pain. I think it cracked all the way down.\" Patient reporting she [facility] and given Amoxicillin for a tooth infection.  Patient reporting the pain is increasing today. Left sided molar pain. Left cheek swelling. Patient reporting she has not been able to sleep all night due to pain. Patient is taking Ibuprofen for pain control with no improvement.    Per guidelines advised to be seen with in 4 hours .Caller verbalized understanding. Denies further questions.    Ruth Bean RN  [facility]        Reason for Disposition    [1] SEVERE pain (e.g., excruciating, unable to do any normal activities) AND [2] not improved 2 hours after pain medicine    Additional Information    Negative: Shock suspected (e.g., cold/pale/clammy skin, too weak to stand, low BP, rapid pulse)    Negative: [1] Similar pain previously AND [2] it was from \"heart attack\"    Negative: [1] Similar pain previously AND [2] it was from \"angina\" AND [3] not relieved by nitroglycerin    Negative: Sounds like a life-threatening emergency to the triager    Negative: Tongue is very swollen and tender    Negative: [1] Face is swollen AND [2] fever    Negative: Patient sounds very sick or weak to the triager    Protocols used: TOOTHACHE-A-AH      "

## 2019-12-16 ENCOUNTER — HEALTH MAINTENANCE LETTER (OUTPATIENT)
Age: 24
End: 2019-12-16

## 2020-09-04 ENCOUNTER — NURSE TRIAGE (OUTPATIENT)
Dept: NURSING | Facility: CLINIC | Age: 25
End: 2020-09-04

## 2020-09-04 ENCOUNTER — HOSPITAL ENCOUNTER (EMERGENCY)
Facility: CLINIC | Age: 25
Discharge: HOME | End: 2020-09-05
Attending: INTERNAL MEDICINE | Admitting: INTERNAL MEDICINE
Payer: COMMERCIAL

## 2020-09-04 VITALS
RESPIRATION RATE: 19 BRPM | HEART RATE: 92 BPM | WEIGHT: 171 LBS | HEIGHT: 67 IN | BODY MASS INDEX: 26.84 KG/M2 | TEMPERATURE: 99 F | OXYGEN SATURATION: 97 % | DIASTOLIC BLOOD PRESSURE: 68 MMHG | SYSTOLIC BLOOD PRESSURE: 133 MMHG

## 2020-09-04 DIAGNOSIS — R10.9 LEFT FLANK PAIN: ICD-10-CM

## 2020-09-04 DIAGNOSIS — O26.91 COMPLICATION OF PREGNANCY, ANTEPARTUM, FIRST TRIMESTER: ICD-10-CM

## 2020-09-04 DIAGNOSIS — K59.00 CONSTIPATION, UNSPECIFIED CONSTIPATION TYPE: ICD-10-CM

## 2020-09-04 LAB
ALBUMIN SERPL-MCNC: 3.3 G/DL (ref 3.4–5)
ALBUMIN UR-MCNC: 10 MG/DL
ALP SERPL-CCNC: 70 U/L (ref 40–150)
ALT SERPL W P-5'-P-CCNC: 15 U/L (ref 0–50)
ANION GAP SERPL CALCULATED.3IONS-SCNC: 6 MMOL/L (ref 3–14)
APPEARANCE UR: ABNORMAL
AST SERPL W P-5'-P-CCNC: 11 U/L (ref 0–45)
BACTERIA #/AREA URNS HPF: ABNORMAL /HPF
BASOPHILS # BLD AUTO: 0 10E9/L (ref 0–0.2)
BASOPHILS NFR BLD AUTO: 0.4 %
BILIRUB SERPL-MCNC: 0.3 MG/DL (ref 0.2–1.3)
BILIRUB UR QL STRIP: NEGATIVE
BUN SERPL-MCNC: 10 MG/DL (ref 7–30)
CALCIUM SERPL-MCNC: 9 MG/DL (ref 8.5–10.1)
CHLORIDE SERPL-SCNC: 106 MMOL/L (ref 94–109)
CO2 SERPL-SCNC: 26 MMOL/L (ref 20–32)
COLOR UR AUTO: YELLOW
CREAT SERPL-MCNC: 0.75 MG/DL (ref 0.52–1.04)
DIFFERENTIAL METHOD BLD: NORMAL
EOSINOPHIL # BLD AUTO: 0.1 10E9/L (ref 0–0.7)
EOSINOPHIL NFR BLD AUTO: 1.2 %
ERYTHROCYTE [DISTWIDTH] IN BLOOD BY AUTOMATED COUNT: 12 % (ref 10–15)
GFR SERPL CREATININE-BSD FRML MDRD: >90 ML/MIN/{1.73_M2}
GLUCOSE SERPL-MCNC: 115 MG/DL (ref 70–99)
GLUCOSE UR STRIP-MCNC: NEGATIVE MG/DL
HCT VFR BLD AUTO: 37.5 % (ref 35–47)
HGB BLD-MCNC: 13.3 G/DL (ref 11.7–15.7)
HGB UR QL STRIP: NEGATIVE
IMM GRANULOCYTES # BLD: 0 10E9/L (ref 0–0.4)
IMM GRANULOCYTES NFR BLD: 0.1 %
KETONES UR STRIP-MCNC: NEGATIVE MG/DL
LEUKOCYTE ESTERASE UR QL STRIP: ABNORMAL
LIPASE SERPL-CCNC: 40 U/L (ref 73–393)
LYMPHOCYTES # BLD AUTO: 2.5 10E9/L (ref 0.8–5.3)
LYMPHOCYTES NFR BLD AUTO: 34.2 %
MCH RBC QN AUTO: 32.2 PG (ref 26.5–33)
MCHC RBC AUTO-ENTMCNC: 35.5 G/DL (ref 31.5–36.5)
MCV RBC AUTO: 91 FL (ref 78–100)
MONOCYTES # BLD AUTO: 0.5 10E9/L (ref 0–1.3)
MONOCYTES NFR BLD AUTO: 6.1 %
MUCOUS THREADS #/AREA URNS LPF: PRESENT /LPF
NEUTROPHILS # BLD AUTO: 4.3 10E9/L (ref 1.6–8.3)
NEUTROPHILS NFR BLD AUTO: 58 %
NITRATE UR QL: NEGATIVE
NRBC # BLD AUTO: 0 10*3/UL
NRBC BLD AUTO-RTO: 0 /100
PH UR STRIP: 6.5 PH (ref 5–7)
PLATELET # BLD AUTO: 298 10E9/L (ref 150–450)
POTASSIUM SERPL-SCNC: 3.5 MMOL/L (ref 3.4–5.3)
PROT SERPL-MCNC: 7.3 G/DL (ref 6.8–8.8)
RBC # BLD AUTO: 4.13 10E12/L (ref 3.8–5.2)
RBC #/AREA URNS AUTO: <1 /HPF (ref 0–2)
SODIUM SERPL-SCNC: 138 MMOL/L (ref 133–144)
SOURCE: ABNORMAL
SP GR UR STRIP: 1.02 (ref 1–1.03)
SQUAMOUS #/AREA URNS AUTO: 14 /HPF (ref 0–1)
UROBILINOGEN UR STRIP-MCNC: 2 MG/DL (ref 0–2)
WBC # BLD AUTO: 7.4 10E9/L (ref 4–11)
WBC #/AREA URNS AUTO: 4 /HPF (ref 0–5)

## 2020-09-04 PROCEDURE — 99285 EMERGENCY DEPT VISIT HI MDM: CPT | Mod: 25 | Performed by: INTERNAL MEDICINE

## 2020-09-04 PROCEDURE — 99285 EMERGENCY DEPT VISIT HI MDM: CPT | Mod: Z6 | Performed by: INTERNAL MEDICINE

## 2020-09-04 PROCEDURE — 85025 COMPLETE CBC W/AUTO DIFF WBC: CPT | Performed by: STUDENT IN AN ORGANIZED HEALTH CARE EDUCATION/TRAINING PROGRAM

## 2020-09-04 PROCEDURE — 81001 URINALYSIS AUTO W/SCOPE: CPT | Performed by: STUDENT IN AN ORGANIZED HEALTH CARE EDUCATION/TRAINING PROGRAM

## 2020-09-04 PROCEDURE — 83690 ASSAY OF LIPASE: CPT | Performed by: STUDENT IN AN ORGANIZED HEALTH CARE EDUCATION/TRAINING PROGRAM

## 2020-09-04 PROCEDURE — 25000128 H RX IP 250 OP 636: Performed by: STUDENT IN AN ORGANIZED HEALTH CARE EDUCATION/TRAINING PROGRAM

## 2020-09-04 PROCEDURE — 80053 COMPREHEN METABOLIC PANEL: CPT | Performed by: STUDENT IN AN ORGANIZED HEALTH CARE EDUCATION/TRAINING PROGRAM

## 2020-09-04 PROCEDURE — 96374 THER/PROPH/DIAG INJ IV PUSH: CPT | Performed by: INTERNAL MEDICINE

## 2020-09-04 RX ORDER — HYDROMORPHONE HCL/0.9% NACL/PF 0.2MG/0.2
0.2 SYRINGE (ML) INTRAVENOUS
Status: COMPLETED | OUTPATIENT
Start: 2020-09-04 | End: 2020-09-04

## 2020-09-04 RX ADMIN — Medication 0.2 MG: at 21:55

## 2020-09-04 ASSESSMENT — ENCOUNTER SYMPTOMS
HEADACHES: 0
DYSURIA: 0
FLANK PAIN: 1
VOMITING: 1
HEMATURIA: 0
SHORTNESS OF BREATH: 0
ACTIVITY CHANGE: 0
FEVER: 0
CONSTIPATION: 1
ABDOMINAL PAIN: 1
APPETITE CHANGE: 0

## 2020-09-04 ASSESSMENT — MIFFLIN-ST. JEOR: SCORE: 1548.28

## 2020-09-04 NOTE — ED AVS SNAPSHOT
[facility], [place], Emergency Department  [address]  [place] [address]  Phone:  [phone]  Fax:  [phone]                                    Rajan Graham   MRN: [id number]    Department:  [facility], Emergency Department   Date of Visit:  9/4/2020           After Visit Summary Signature Page    I have received my discharge instructions, and my questions have been answered. I have discussed any challenges I see with this plan with the nurse or doctor.    ..........................................................................................................................................  Patient/Patient Representative Signature      ..........................................................................................................................................  Patient Representative Print Name and Relationship to Patient    ..................................................               ................................................  Date                                   Time    ..........................................................................................................................................  Reviewed by Signature/Title    ...................................................              ..............................................  Date                                               Time          22EPIC Rev 08/18

## 2020-09-05 ENCOUNTER — APPOINTMENT (OUTPATIENT)
Dept: ULTRASOUND IMAGING | Facility: CLINIC | Age: 25
End: 2020-09-05
Payer: COMMERCIAL

## 2020-09-05 PROCEDURE — 25000132 ZZH RX MED GY IP 250 OP 250 PS 637: Performed by: INTERNAL MEDICINE

## 2020-09-05 PROCEDURE — 76770 US EXAM ABDO BACK WALL COMP: CPT

## 2020-09-05 RX ORDER — ACETAMINOPHEN 325 MG/1
325-650 TABLET ORAL EVERY 6 HOURS PRN
Qty: 40 TABLET | Refills: 0 | Status: SHIPPED | OUTPATIENT
Start: 2020-09-05 | End: 2021-01-13

## 2020-09-05 RX ADMIN — MAGNESIUM HYDROXIDE 30 ML: 400 SUSPENSION ORAL at 00:55

## 2020-09-05 NOTE — ED NOTES
Pt education completed, Pt and family deny and questions or concerns at this time. Pt discharged w/ all belongings
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
## 2020-09-05 NOTE — DISCHARGE INSTRUCTIONS
Milk of Magnesia 30 mg daily as needed for constipation.  Tylenol 650 mg every 4 hours as needed for pain.  Follow up [facility] clinic.   Return for worsening pain or new or worsening symptoms.

## 2020-09-05 NOTE — TELEPHONE ENCOUNTER
Pt states she is 11 wks pregnant. C/o sharp, cramping pain in left side of abdomen, constant for past 1 hour. Rates pain 9/10 severity. No vaginal bleeding. Walking hunched over. Advised ED now.     COVID 19 Nurse Triage Plan/Patient Instructions      Disposition/Instructions    ED Visit recommended. Follow protocol based instructions.           Reason for Disposition    MODERATE-SEVERE abdominal pain (e.g., interferes with normal activities, awakens from sleep)    Additional Information    Negative: Passed out (i.e., lost consciousness, collapsed and was not responding)    Negative: Shock suspected (e.g., cold/pale/clammy skin, too weak to stand, low BP, rapid pulse)    Negative: Difficult to awaken or acting confused (e.g., disoriented, slurred speech)    Negative: Sounds like a life-threatening emergency to the triager    Negative: Followed an abdomen (stomach) injury    Negative: [1] Abdominal pain AND [2] pregnant > 20 weeks    Protocols used: PREGNANCY - ABDOMINAL PAIN LESS THAN 20 WEEKS [facility]-

## 2020-09-11 DIAGNOSIS — Z87.59 HISTORY OF PRIOR PREGNANCY WITH IUGR NEWBORN: ICD-10-CM

## 2020-09-11 DIAGNOSIS — O09.90 HIGH-RISK PREGNANCY, UNSPECIFIED TRIMESTER: Primary | ICD-10-CM

## 2020-09-30 ENCOUNTER — THERAPY VISIT (OUTPATIENT)
Dept: PHYSICAL THERAPY | Facility: CLINIC | Age: 25
End: 2020-09-30
Payer: COMMERCIAL

## 2020-09-30 DIAGNOSIS — M54.9 BACK PAIN: ICD-10-CM

## 2020-09-30 PROCEDURE — 97161 PT EVAL LOW COMPLEX 20 MIN: CPT | Mod: GP | Performed by: PHYSICAL THERAPIST

## 2020-09-30 PROCEDURE — 97110 THERAPEUTIC EXERCISES: CPT | Mod: GP | Performed by: PHYSICAL THERAPIST

## 2020-09-30 PROCEDURE — 97535 SELF CARE MNGMENT TRAINING: CPT | Mod: GP | Performed by: PHYSICAL THERAPIST

## 2020-09-30 NOTE — PROGRESS NOTES
[facility] Initial Evaluation  Subjective:  The history is provided by the patient. No  was used.   Therapist Generated HPI Evaluation  Problem details: BAck pain with pregnancy.  Saw MD for this issue on 2020.  Had one delivery vaginally. History of two  deliveries. Developed back pain after pregnancy and during delivery in .  Pain began after epidural.  Currently 15 weeks pregnant.  Has been to the ER several times due to pain.  Feels some pain in the left groin.  Pain to left leg.  Left leg numbness with using the toilet.  Goes on short walks but has to stop due to pain in the back and pelvic region.  Also has a history of constipation.  Has been taking stool softener and Miralax. Taking ASA for HBP.  .         Type of problem:  Pelvic dysfunction.    This is a recurrent condition.  Condition occurred with:  After pregnancy.    Patient reports pain:  Mid lumbar spine.  Pain is described as aching and sharp and is intermittent.  Pain radiates to:  Gluteal left. Pain is worse during the day.  Since onset symptoms are unchanged.  Associated symptoms:  Numbness. Symptoms are exacerbated by walking (bowel movements)  and relieved by heat.      Barriers include:  Stairs.                        Objective:  Standing Alignment:              Knee:  Genu valgus R and genu valgus L      Gait:      Deviations:  Hip:  Decr dynamic control L, decr dynamic control R, Trendelenberg L and Trendelenberg R    Flexibility/Screens:       Lower Extremity:  Decreased left lower extremity flexibility:Hip Flexors    Decreased right lower extremity flexibility:  Hip Flexors               Lumbar/SI Evaluation  ROM:    AROM Lumbar:   Flexion:        Hands to knees, no pain  Ext:                    Increased central low back pain   Side Bend:        Left:     Right:   Rotation:           Left:     Right:   Side Glide:        Left:     Right:           Lumbar Myotomes:   normal            Lumbar DTR's:  not assessed      Cord Signs:  not assessed    Lumbar Dermtomes:  normal                Neural Tension/Mobility:  Lumbar:  Normal        Lumbar Palpation:    Tenderness present at Left:    Quadratus Lumborum; Erector Spinae and PSIS  Tenderness present at Right: Quadratus Lumborum; Erector Spinae and PSIS        SI joint/Sacrum:        Left positive at:    Thigh thrust                                        Hip Evaluation  Hip PROM:            Internal Rotation: Left: 60    Right: 60  External Rotation: Left: 30    Right: 30                               General     ROS    Assessment/Plan:    Patient is a 25 year old female with lumbar complaints.    Patient has the following significant findings with corresponding treatment plan.                Diagnosis 1:  Back pain  Pain -  manual therapy, splint/taping/bracing/orthotics, self management, education and home program  Decreased ROM/flexibility - manual therapy, therapeutic exercise and home program  Decreased strength - therapeutic exercise, therapeutic activities and home program  Decreased function - therapeutic activities and home program    Therapy Evaluation Codes:   1) History comprised of:   Personal factors that impact the plan of care:      Time since onset of symptoms.    Comorbidity factors that impact the plan of care are:      Current pregnancy.     Medications impacting care: None.  2) Examination of Body Systems comprised of:   Body structures and functions that impact the plan of care:      Lumbar spine and Pelvis.   Activity limitations that impact the plan of care are:      Bending, Cooking, Lifting, Walking and Sleeping.  3) Clinical presentation characteristics are:   Stable/Uncomplicated.  4) Decision-Making    Low complexity using standardized patient assessment instrument and/or measureable assessment of functional outcome.  Cumulative Therapy Evaluation is: Low complexity.    Previous and current functional  limitations:  (See Goal Flow Sheet for this information)    Short term and Long term goals: (See Goal Flow Sheet for this information)     Communication ability:  Patient appears to be able to clearly communicate and understand verbal and written communication and follow directions correctly.  Treatment Explanation - The following has been discussed with the patient:   RX ordered/plan of care  Anticipated outcomes  Possible risks and side effects  This patient would benefit from PT intervention to resume normal activities.   Rehab potential is good.    Frequency:  2 X a month, once daily  Duration:  for 2 months  Discharge Plan:  Achieve all LTG.  Independent in home treatment program.  Reach maximal therapeutic benefit.    Please refer to the daily flowsheet for treatment today, total treatment time and time spent performing 1:1 timed codes.

## 2020-09-30 NOTE — LETTER
[facility] ATHLETIC [facility] PHYSICAL [facility]  [address]  [place] [address] [phone]    2020    Re: Rajan Graham   :   1995  MRN:  [id number]   REFERRING PHYSICIAN:   Renea Wesley    [facility]  Date of Initial Evaluation:  20  Visits:  Rxs Used: 1  Reason for Referral:  Back pain    EVALUATION SUMMARY    [facility] Initial Evaluation  Subjective:  The history is provided by the patient. No  was used.   Patient Health History  Pertinent medical history includes: asthma, currently pregnant, high blood pressure and smoking.   Red flags:  Chest pain and pain at rest/night.  Medical allergies: latex.   Surgeries include:  Other ().    Current medications:  Pain medication.    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       Therapist Generated HPI Evaluation  Problem details: BAck pain with pregnancy.  Saw MD for this issue on 2020.  Had one delivery vaginally. History of two  deliveries. Developed back pain after pregnancy and during delivery in .  Pain began after epidural.  Currently 15 weeks pregnant.  Has been to the ER several times due to pain.  Feels some pain in the left groin.  Pain to left leg.  Left leg numbness with using the toilet.  Goes on short walks but has to stop due to pain in the back and pelvic region.  Also has a history of constipation.  Has been taking stool softener and Miralax. Taking ASA for HBP.  .         Type of problem:  Pelvic dysfunction.  This is a recurrent condition.  Condition occurred with:  After pregnancy.  Patient reports pain:  Mid lumbar spine.  Pain is described as aching and sharp and is intermittent.  Pain radiates to:  Gluteal left. Pain is worse during the day.  Since onset symptoms are unchanged.  Associated symptoms:  Numbness.  Symptoms are exacerbated by walking (bowel movements)  and relieved by heat.  Barriers include:  Stairs.    Objective:  Standing Alignment:    Knee:  Genu valgus R and genu valgus L  Gait:    Deviations:  Hip:  Decr dynamic control L, decr dynamic control R, Trendelenberg L and  Re: Rajan Graham   :   1995    Trendelenberg R    Flexibility/Screens:   Lower Extremity:  Decreased left lower extremity flexibility:Hip Flexors  Decreased right lower extremity flexibility:  Hip Flexors     Lumbar/SI Evaluation  ROM:    AROM Lumbar:   Flexion:        Hands to knees, no pain  Ext:                    Increased central low back pain   Side Bend:        Left:     Right:   Rotation:           Left:     Right:   Side Glide:        Left:     Right:   Lumbar Myotomes:  normal  Lumbar DTR's:  not assessed  Cord Signs:  not assessed  Lumbar Dermtomes:  normal  Neural Tension/Mobility:  Lumbar:  Normal    Lumbar Palpation:    Tenderness present at Left:    Quadratus Lumborum; Erector Spinae and PSIS  Tenderness present at Right: Quadratus Lumborum; Erector Spinae and PSIS  SI joint/Sacrum:      Left positive at:    Thigh thrust  Hip Evaluation  Hip PROM:    Internal Rotation: Left: 60    Right: 60  External Rotation: Left: 30    Right: 30    Assessment/Plan:    Patient is a 25 year old female with lumbar complaints.    Patient has the following significant findings with corresponding treatment plan.                Diagnosis 1:  Back pain  Pain -  manual therapy, splint/taping/bracing/orthotics, self management, education and home program  Decreased ROM/flexibility - manual therapy, therapeutic exercise and home program  Decreased strength - therapeutic exercise, therapeutic activities and home program  Decreased function - therapeutic activities and home program    Therapy Evaluation Codes:   1) History comprised of:   Personal factors that impact the plan of care:      Time since onset of symptoms.    Comorbidity  factors that impact the plan of care are:      Current pregnancy.     Medications impacting care: None.  2) Examination of Body Systems comprised of:   Body structures and functions that impact the plan of care:      Lumbar spine and Pelvis.   Activity limitations that impact the plan of care are:      Bending, Cooking, Lifting, Walking and Sleeping.  3) Clinical presentation characteristics are:  Re: Rajan Graham   :   1995     Stable/Uncomplicated.  4) Decision-Making    Low complexity using standardized patient assessment instrument and/or measureable assessment of functional outcome.  Cumulative Therapy Evaluation is: Low complexity.    Previous and current functional limitations:  (See Goal Flow Sheet for this information)    Short term and Long term goals: (See Goal Flow Sheet for this information)     Communication ability:  Patient appears to be able to clearly communicate and understand verbal and written communication and follow directions correctly.  Treatment Explanation - The following has been discussed with the patient:   RX ordered/plan of care  Anticipated outcomes  Possible risks and side effects  This patient would benefit from PT intervention to resume normal activities.   Rehab potential is good.    Frequency:  2 X a month, once daily  Duration:  for 2 months  Discharge Plan:  Achieve all LTG.  Independent in home treatment program.  Reach maximal therapeutic benefit.    Please refer to the daily flowsheet for treatment today, total treatment time and time spent performing 1:1 timed codes.     Thank you for your referral.    INQUIRIES  Therapist: Eloisa Kee PT, [facility]  [facility] PH[facility]ICAL THERAPY  [address] [place] [address] [id number]  [facility] [address]  Phone: [phone]  Fax: [phone]

## 2020-09-30 NOTE — PROGRESS NOTES
[facility] Initial Evaluation  Subjective:    Patient Health History             Pertinent medical history includes: asthma, currently pregnant, high blood pressure and smoking.   Red flags:  Chest pain and pain at rest/night.  Medical allergies: latex.   Surgeries include:  Other ().    Current medications:  Pain medication.          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                Oswestry Score: 36 %                 Objective:  System    Physical Exam    General     ROS    Assessment/Plan:

## 2020-10-19 ENCOUNTER — PRE VISIT (OUTPATIENT)
Dept: MATERNAL FETAL MEDICINE | Facility: CLINIC | Age: 25
End: 2020-10-19

## 2020-10-22 ENCOUNTER — HOSPITAL ENCOUNTER (OUTPATIENT)
Dept: ULTRASOUND IMAGING | Facility: CLINIC | Age: 25
End: 2020-10-22
Attending: ADVANCED PRACTICE MIDWIFE
Payer: COMMERCIAL

## 2020-10-22 ENCOUNTER — OFFICE VISIT (OUTPATIENT)
Dept: MATERNAL FETAL MEDICINE | Facility: CLINIC | Age: 25
End: 2020-10-22
Attending: ADVANCED PRACTICE MIDWIFE
Payer: COMMERCIAL

## 2020-10-22 DIAGNOSIS — O26.90 PREGNANCY RELATED CONDITION, ANTEPARTUM: ICD-10-CM

## 2020-10-22 DIAGNOSIS — Z87.59 HISTORY OF PRIOR PREGNANCY WITH SMALL FOR GESTATIONAL AGE NEWBORN: Primary | ICD-10-CM

## 2020-10-22 PROCEDURE — 76811 OB US DETAILED SNGL FETUS: CPT | Mod: 26 | Performed by: OBSTETRICS & GYNECOLOGY

## 2020-10-22 PROCEDURE — 76817 TRANSVAGINAL US OBSTETRIC: CPT | Mod: 26 | Performed by: OBSTETRICS & GYNECOLOGY

## 2020-10-22 PROCEDURE — 99213 OFFICE O/P EST LOW 20 MIN: CPT | Mod: 25 | Performed by: OBSTETRICS & GYNECOLOGY

## 2020-10-22 PROCEDURE — 76817 TRANSVAGINAL US OBSTETRIC: CPT

## 2020-10-22 NOTE — NURSING NOTE
Rajan here for comp and MFM consult due to preg c/b FGR x 2. Dr. Blanco in to talk with pt. Pt needs U/S in 3 weeks for not being able to see the heart and then will need growth U/S. Ruth Goldstein RN

## 2020-10-27 ENCOUNTER — APPOINTMENT (OUTPATIENT)
Dept: ULTRASOUND IMAGING | Facility: CLINIC | Age: 25
End: 2020-10-27
Attending: EMERGENCY MEDICINE
Payer: COMMERCIAL

## 2020-10-27 ENCOUNTER — HOSPITAL ENCOUNTER (EMERGENCY)
Facility: CLINIC | Age: 25
Discharge: HOME | End: 2020-10-27
Attending: EMERGENCY MEDICINE | Admitting: EMERGENCY MEDICINE
Payer: COMMERCIAL

## 2020-10-27 VITALS
WEIGHT: 170 LBS | TEMPERATURE: 98.2 F | HEART RATE: 72 BPM | OXYGEN SATURATION: 99 % | DIASTOLIC BLOOD PRESSURE: 66 MMHG | BODY MASS INDEX: 26.63 KG/M2 | SYSTOLIC BLOOD PRESSURE: 121 MMHG | RESPIRATION RATE: 14 BRPM
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
## 2020-10-27 DIAGNOSIS — Z3A.19 19 WEEKS GESTATION OF PREGNANCY: ICD-10-CM

## 2020-10-27 DIAGNOSIS — S39.91XA: ICD-10-CM

## 2020-10-27 DIAGNOSIS — O9A.212 TRAUMATIC INJURY DURING PREGNANCY, SECOND TRIMESTER: ICD-10-CM

## 2020-10-27 PROCEDURE — 250N000013 HC RX MED GY IP 250 OP 250 PS 637: Performed by: EMERGENCY MEDICINE

## 2020-10-27 PROCEDURE — 76815 OB US LIMITED FETUS(S): CPT

## 2020-10-27 PROCEDURE — 99284 EMERGENCY DEPT VISIT MOD MDM: CPT | Mod: 25

## 2020-10-27 PROCEDURE — 99284 EMERGENCY DEPT VISIT MOD MDM: CPT | Performed by: EMERGENCY MEDICINE

## 2020-10-27 RX ORDER — ONDANSETRON 4 MG/1
4 TABLET, ORALLY DISINTEGRATING ORAL EVERY 8 HOURS PRN
COMMUNITY
End: 2021-11-29

## 2020-10-27 RX ORDER — ACETAMINOPHEN 500 MG
1000 TABLET ORAL ONCE
Status: COMPLETED | OUTPATIENT
Start: 2020-10-27 | End: 2020-10-27

## 2020-10-27 RX ADMIN — ACETAMINOPHEN 1000 MG: 500 TABLET ORAL at 10:58

## 2020-10-27 ASSESSMENT — ENCOUNTER SYMPTOMS
COLOR CHANGE: 0
NECK STIFFNESS: 0
DYSURIA: 0
SHORTNESS OF BREATH: 0
HEMATURIA: 0
CONFUSION: 0
ABDOMINAL PAIN: 1
HEADACHES: 0
VOMITING: 1
FEVER: 0
DIFFICULTY URINATING: 0
NAUSEA: 1
ARTHRALGIAS: 0
EYE REDNESS: 0

## 2020-10-27 NOTE — ED NOTES
Patient and family member reviewed discharge.  Discussed printed discharge information.  Follow-up appts reviewed.   What s/s warrant return to the ER.  Prescriptions and how to take them.  Importance of social distancing, good hand hygiene, and wearing a mask when in public spaces.
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
## 2020-10-27 NOTE — ED AVS SNAPSHOT
[facility] Emergency Department  [address]  [place] [address]  Phone: [phone]  Fax: [phone]                                    Rajan Graham   MRN: [id number]    Department: [facility] Emergency Department   Date of Visit: 10/27/2020           After Visit Summary Signature Page    I have received my discharge instructions, and my questions have been answered. I have discussed any challenges I see with this plan with the nurse or doctor.    ..........................................................................................................................................  Patient/Patient Representative Signature      ..........................................................................................................................................  Patient Representative Print Name and Relationship to Patient    ..................................................               ................................................  Date                                   Time    ..........................................................................................................................................  Reviewed by Signature/Title    ...................................................              ..............................................  Date                                               Time          22EPIC Rev 08/18

## 2020-11-05 NOTE — PROGRESS NOTES
Patient seen for one time evaluation and treatment.  Patient did not return for further treatment and current status is unknown. Patient did not show for her second visit. Please see initial evaluation for further information.

## 2020-11-18 ENCOUNTER — OFFICE VISIT (OUTPATIENT)
Dept: MATERNAL FETAL MEDICINE | Facility: CLINIC | Age: 25
End: 2020-11-18
Attending: OBSTETRICS & GYNECOLOGY
Payer: COMMERCIAL

## 2020-11-18 ENCOUNTER — HOSPITAL ENCOUNTER (OUTPATIENT)
Dept: ULTRASOUND IMAGING | Facility: CLINIC | Age: 25
End: 2020-11-18
Attending: OBSTETRICS & GYNECOLOGY
Payer: COMMERCIAL

## 2020-11-18 DIAGNOSIS — Z87.59 HISTORY OF PRIOR PREGNANCY WITH SMALL FOR GESTATIONAL AGE NEWBORN: Primary | ICD-10-CM

## 2020-11-18 DIAGNOSIS — O26.90 PREGNANCY RELATED CONDITION, ANTEPARTUM: ICD-10-CM

## 2020-11-18 PROCEDURE — 76816 OB US FOLLOW-UP PER FETUS: CPT | Mod: 26 | Performed by: OBSTETRICS & GYNECOLOGY

## 2020-11-18 PROCEDURE — 76816 OB US FOLLOW-UP PER FETUS: CPT

## 2020-11-18 NOTE — PROGRESS NOTES
Please refer to ultrasound report under 'Imaging' Studies of 'Chart Review' tabs.    Adelfo Harman M.D.

## 2020-12-27 ENCOUNTER — TELEPHONE (OUTPATIENT)
Dept: OBGYN | Facility: CLINIC | Age: 25
End: 2020-12-27

## 2020-12-27 NOTE — TELEPHONE ENCOUNTER
"Call from patient reporting sharp pain in pelvic area, lasts for a few seconds. Has also felt sharp pain occasionally on right and left side below belly button. Has felt these many times in the last few days. Pt states she was in bed most of the day yesterday and it occurred often. Today it has happened 1-2 times per hour.     Pt also reports \"sakina caceres contractions.\" Non-painful tightening of the belly, irregular.     She denies any LOF, vaginal bleeding, decreased fetal movement, dysuria, or change in vaginal discharge. She does endorse painful, strained bowel movements.     Discussed sharp pain is likely round ligament pain and/or due to fetal movement. Reassured that occasional sharp pain is likely normal as is occasional non-painful tightening. Reviewed to call if belly tightening is becoming regular, like 4 times per hour, or uncomfortable. Reviewed S&S of  labor including pelvic, back, or abdominal cramping, LOF, pelvic pressure, or vaginal bleeding. Pt advised to call/present immediately. Pt verbalizes understanding.     Encouraged patient to increase fluid intake. States she has been drinking water, but is unsure how much. Discussed minimal 10 cups/day.     She has a follow-up M US on 20. She was scheduled for a clinic appt 20, but states she waited 30 mins and then left. Apologized for inconvenience and encouraged patient to reschedule as soon as possible.     JENIFFER Stephenson, MAGGIEM  "

## 2020-12-30 ENCOUNTER — HOSPITAL ENCOUNTER (OUTPATIENT)
Dept: ULTRASOUND IMAGING | Facility: CLINIC | Age: 25
End: 2020-12-30
Attending: OBSTETRICS & GYNECOLOGY
Payer: COMMERCIAL

## 2020-12-30 ENCOUNTER — OFFICE VISIT (OUTPATIENT)
Dept: MATERNAL FETAL MEDICINE | Facility: CLINIC | Age: 25
End: 2020-12-30
Attending: OBSTETRICS & GYNECOLOGY
Payer: COMMERCIAL

## 2020-12-30 VITALS — SYSTOLIC BLOOD PRESSURE: 118 MMHG | DIASTOLIC BLOOD PRESSURE: 67 MMHG | HEART RATE: 79 BPM

## 2020-12-30 DIAGNOSIS — Z87.59 HISTORY OF PRIOR PREGNANCY WITH SMALL FOR GESTATIONAL AGE NEWBORN: ICD-10-CM

## 2020-12-30 DIAGNOSIS — O36.5990 PREGNANCY AFFECTED BY FETAL GROWTH RESTRICTION: Primary | ICD-10-CM

## 2020-12-30 PROCEDURE — 76816 OB US FOLLOW-UP PER FETUS: CPT

## 2020-12-30 PROCEDURE — 76820 UMBILICAL ARTERY ECHO: CPT | Mod: 26 | Performed by: OBSTETRICS & GYNECOLOGY

## 2020-12-30 PROCEDURE — 76816 OB US FOLLOW-UP PER FETUS: CPT | Mod: 26 | Performed by: OBSTETRICS & GYNECOLOGY

## 2021-01-06 ENCOUNTER — OFFICE VISIT (OUTPATIENT)
Dept: MATERNAL FETAL MEDICINE | Facility: CLINIC | Age: 26
End: 2021-01-06
Attending: OBSTETRICS & GYNECOLOGY
Payer: COMMERCIAL

## 2021-01-06 ENCOUNTER — HOSPITAL ENCOUNTER (OUTPATIENT)
Dept: ULTRASOUND IMAGING | Facility: CLINIC | Age: 26
End: 2021-01-06
Attending: OBSTETRICS & GYNECOLOGY
Payer: COMMERCIAL

## 2021-01-06 ENCOUNTER — HOSPITAL ENCOUNTER (OUTPATIENT)
Facility: CLINIC | Age: 26
Setting detail: OBSERVATION
Discharge: HOME | End: 2021-01-07
Attending: ADVANCED PRACTICE MIDWIFE | Admitting: ADVANCED PRACTICE MIDWIFE
Payer: COMMERCIAL

## 2021-01-06 VITALS — SYSTOLIC BLOOD PRESSURE: 122 MMHG | DIASTOLIC BLOOD PRESSURE: 54 MMHG

## 2021-01-06 DIAGNOSIS — O36.5990 PREGNANCY AFFECTED BY FETAL GROWTH RESTRICTION: ICD-10-CM

## 2021-01-06 DIAGNOSIS — O36.5990 PREGNANCY AFFECTED BY FETAL GROWTH RESTRICTION: Primary | ICD-10-CM

## 2021-01-06 PROBLEM — Z36.89 ENCOUNTER FOR TRIAGE IN PREGNANT PATIENT: Status: ACTIVE | Noted: 2019-02-18

## 2021-01-06 LAB
ALBUMIN UR-MCNC: NEGATIVE MG/DL
APPEARANCE UR: CLEAR
BILIRUB UR QL STRIP: NEGATIVE
COLOR UR AUTO: YELLOW
GLUCOSE UR STRIP-MCNC: NEGATIVE MG/DL
HGB UR QL STRIP: NEGATIVE
KETONES UR STRIP-MCNC: NEGATIVE MG/DL
LEUKOCYTE ESTERASE UR QL STRIP: NEGATIVE
MUCOUS THREADS #/AREA URNS LPF: PRESENT /LPF
NITRATE UR QL: NEGATIVE
PH UR STRIP: 6.5 PH (ref 5–7)
RBC #/AREA URNS AUTO: 0 /HPF (ref 0–2)
SOURCE: ABNORMAL
SP GR UR STRIP: 1.01 (ref 1–1.03)
SPECIMEN SOURCE: NORMAL
SQUAMOUS #/AREA URNS AUTO: <1 /HPF (ref 0–1)
UROBILINOGEN UR STRIP-MCNC: NORMAL MG/DL (ref 0–2)
WBC #/AREA URNS AUTO: <1 /HPF (ref 0–5)
WET PREP SPEC: NORMAL

## 2021-01-06 PROCEDURE — 59025 FETAL NON-STRESS TEST: CPT | Mod: 26 | Performed by: OBSTETRICS & GYNECOLOGY

## 2021-01-06 PROCEDURE — 59025 FETAL NON-STRESS TEST: CPT

## 2021-01-06 PROCEDURE — 76815 OB US LIMITED FETUS(S): CPT

## 2021-01-06 PROCEDURE — 87210 SMEAR WET MOUNT SALINE/INK: CPT | Performed by: ADVANCED PRACTICE MIDWIFE

## 2021-01-06 PROCEDURE — 87591 N.GONORRHOEAE DNA AMP PROB: CPT | Performed by: ADVANCED PRACTICE MIDWIFE

## 2021-01-06 PROCEDURE — 59025 FETAL NON-STRESS TEST: CPT | Performed by: OBSTETRICS & GYNECOLOGY

## 2021-01-06 PROCEDURE — 87491 CHLMYD TRACH DNA AMP PROBE: CPT | Performed by: ADVANCED PRACTICE MIDWIFE

## 2021-01-06 PROCEDURE — 87086 URINE CULTURE/COLONY COUNT: CPT | Performed by: ADVANCED PRACTICE MIDWIFE

## 2021-01-06 PROCEDURE — 76815 OB US LIMITED FETUS(S): CPT | Mod: 26 | Performed by: OBSTETRICS & GYNECOLOGY

## 2021-01-06 PROCEDURE — 81001 URINALYSIS AUTO W/SCOPE: CPT | Performed by: ADVANCED PRACTICE MIDWIFE

## 2021-01-06 RX ORDER — ASPIRIN 81 MG/1
81 TABLET, CHEWABLE ORAL DAILY
Status: ON HOLD | COMMUNITY
End: 2021-02-25

## 2021-01-07 ENCOUNTER — TELEPHONE (OUTPATIENT)
Dept: OBGYN | Facility: CLINIC | Age: 26
End: 2021-01-07

## 2021-01-07 ENCOUNTER — HOSPITAL ENCOUNTER (OUTPATIENT)
Facility: CLINIC | Age: 26
Setting detail: OBSERVATION
End: 2021-01-07
Admitting: ADVANCED PRACTICE MIDWIFE
Payer: COMMERCIAL

## 2021-01-07 VITALS
SYSTOLIC BLOOD PRESSURE: 131 MMHG | DIASTOLIC BLOOD PRESSURE: 59 MMHG | TEMPERATURE: 97.9 F | HEART RATE: 85 BPM | RESPIRATION RATE: 16 BRPM

## 2021-01-07 DIAGNOSIS — O09.90 HIGH-RISK PREGNANCY, UNSPECIFIED TRIMESTER: Primary | ICD-10-CM

## 2021-01-07 PROBLEM — O34.219 PREVIOUS CESAREAN DELIVERY, ANTEPARTUM CONDITION OR COMPLICATION: Status: ACTIVE | Noted: 2018-11-28

## 2021-01-07 PROBLEM — Z78.9 NOT IMMUNE TO HEPATITIS B VIRUS: Status: ACTIVE | Noted: 2020-09-13

## 2021-01-07 PROBLEM — Z98.891 S/P CESAREAN SECTION: Status: RESOLVED | Noted: 2019-06-07 | Resolved: 2021-01-07

## 2021-01-07 PROBLEM — F12.90 MARIJUANA USE: Status: ACTIVE | Noted: 2021-01-07

## 2021-01-07 PROBLEM — Z98.891 HISTORY OF 2 CESAREAN SECTIONS: Status: ACTIVE | Noted: 2019-06-07

## 2021-01-07 PROBLEM — O34.219 PREVIOUS CESAREAN DELIVERY, ANTEPARTUM CONDITION OR COMPLICATION: Status: RESOLVED | Noted: 2018-11-28 | Resolved: 2021-01-07

## 2021-01-07 PROBLEM — O36.5990 PREGNANCY AFFECTED BY FETAL GROWTH RESTRICTION: Status: ACTIVE | Noted: 2020-12-31

## 2021-01-07 PROBLEM — O34.219 PREVIOUS CESAREAN DELIVERY AFFECTING PREGNANCY: Status: RESOLVED | Noted: 2019-06-07 | Resolved: 2021-01-07

## 2021-01-07 PROBLEM — E55.9 VITAMIN D DEFICIENCY: Status: ACTIVE | Noted: 2020-09-13

## 2021-01-07 PROBLEM — Z36.89 ENCOUNTER FOR TRIAGE IN PREGNANT PATIENT: Status: RESOLVED | Noted: 2019-02-18 | Resolved: 2021-01-07

## 2021-01-07 LAB
AMPHETAMINES UR QL SCN: NEGATIVE
C TRACH DNA SPEC QL NAA+PROBE: NEGATIVE
CANNABINOIDS UR QL: ABNORMAL
COCAINE UR QL: NEGATIVE
CREAT UR-MCNC: 51 MG/DL
CREAT UR-MCNC: 51 MG/DL
LABORATORY COMMENT REPORT: NORMAL
N GONORRHOEA DNA SPEC QL NAA+PROBE: NEGATIVE
OPIATES UR QL SCN: NEGATIVE
PCP UR QL SCN: NEGATIVE
SARS-COV-2 RNA RESP QL NAA+PROBE: NEGATIVE
SPECIMEN SOURCE: NORMAL

## 2021-01-07 PROCEDURE — G0378 HOSPITAL OBSERVATION PER HR: HCPCS

## 2021-01-07 PROCEDURE — 80349 CANNABINOIDS NATURAL: CPT | Performed by: ADVANCED PRACTICE MIDWIFE

## 2021-01-07 PROCEDURE — 96372 THER/PROPH/DIAG INJ SC/IM: CPT | Performed by: ADVANCED PRACTICE MIDWIFE

## 2021-01-07 PROCEDURE — G0463 HOSPITAL OUTPT CLINIC VISIT: HCPCS | Mod: 25

## 2021-01-07 PROCEDURE — 258N000003 HC RX IP 258 OP 636

## 2021-01-07 PROCEDURE — 87635 SARS-COV-2 COVID-19 AMP PRB: CPT | Performed by: ADVANCED PRACTICE MIDWIFE

## 2021-01-07 PROCEDURE — 250N000013 HC RX MED GY IP 250 OP 250 PS 637: Performed by: ADVANCED PRACTICE MIDWIFE

## 2021-01-07 PROCEDURE — 250N000011 HC RX IP 250 OP 636: Performed by: ADVANCED PRACTICE MIDWIFE

## 2021-01-07 PROCEDURE — 59025 FETAL NON-STRESS TEST: CPT

## 2021-01-07 PROCEDURE — 99213 OFFICE O/P EST LOW 20 MIN: CPT | Performed by: ADVANCED PRACTICE MIDWIFE

## 2021-01-07 PROCEDURE — 80307 DRUG TEST PRSMV CHEM ANLYZR: CPT | Performed by: ADVANCED PRACTICE MIDWIFE

## 2021-01-07 RX ORDER — SODIUM CHLORIDE, SODIUM LACTATE, POTASSIUM CHLORIDE, CALCIUM CHLORIDE 600; 310; 30; 20 MG/100ML; MG/100ML; MG/100ML; MG/100ML
INJECTION, SOLUTION INTRAVENOUS
Status: COMPLETED
Start: 2021-01-07 | End: 2021-01-07

## 2021-01-07 RX ORDER — BISACODYL 10 MG
10 SUPPOSITORY, RECTAL RECTAL DAILY PRN
Status: DISCONTINUED | OUTPATIENT
Start: 2021-01-07 | End: 2021-01-07 | Stop reason: HOSPADM

## 2021-01-07 RX ORDER — BETAMETHASONE SODIUM PHOSPHATE AND BETAMETHASONE ACETATE 3; 3 MG/ML; MG/ML
12 INJECTION, SUSPENSION INTRA-ARTICULAR; INTRALESIONAL; INTRAMUSCULAR; SOFT TISSUE EVERY 24 HOURS
Status: DISCONTINUED | OUTPATIENT
Start: 2021-01-07 | End: 2021-01-07 | Stop reason: HOSPADM

## 2021-01-07 RX ORDER — ONDANSETRON 4 MG/1
4 TABLET, ORALLY DISINTEGRATING ORAL EVERY 6 HOURS PRN
Status: DISCONTINUED | OUTPATIENT
Start: 2021-01-07 | End: 2021-01-07 | Stop reason: HOSPADM

## 2021-01-07 RX ADMIN — BETAMETHASONE SODIUM PHOSPHATE AND BETAMETHASONE ACETATE 12 MG: 3; 3 INJECTION, SUSPENSION INTRA-ARTICULAR; INTRALESIONAL; INTRAMUSCULAR at 09:27

## 2021-01-07 RX ADMIN — ONDANSETRON 4 MG: 4 TABLET, ORALLY DISINTEGRATING ORAL at 02:05

## 2021-01-07 RX ADMIN — BISACODYL 10 MG: 10 SUPPOSITORY RECTAL at 09:55

## 2021-01-07 RX ADMIN — SODIUM CHLORIDE, POTASSIUM CHLORIDE, SODIUM LACTATE AND CALCIUM CHLORIDE 1000 ML: 600; 310; 30; 20 INJECTION, SOLUTION INTRAVENOUS at 01:17

## 2021-01-07 NOTE — PROVIDER NOTIFICATION
01/07/21 0859   Provider Notification   Provider Name/Title Litzy LUNA   Method of Notification In Department   Notification Reason Other (Comment);Status Update   Patient okayed to be discharged to home and [facility] clinic will call patient to set up care at [facility] so patient is able to make her appointments with M all in one visit. Khalif would like patient to receive first dose of beta today and suppository for constipation.

## 2021-01-07 NOTE — PROVIDER NOTIFICATION
01/07/21 0057   Provider Notification   Provider Name/Title Jessica LUNA   Method of Notification Electronic Page   Request Evaluate-Remote   Notification Reason Other   pt having decels. Do you want me to start fluids?

## 2021-01-07 NOTE — PLAN OF CARE
Pt arrived to triage c/o lower right sided abdominal pain when she moves and decreased fetal movement. FHR detected (see flowsheets for interpretation), urine, GC/Chlam, and wet prep sent. Results pending. Pt resting on left side. Juice and snacks provided.

## 2021-01-07 NOTE — PROGRESS NOTES
HOSPITAL TRIAGE NOTE  ===================    CHIEF COMPLAINT  ========================  Rajan Grahma is a 25 year old patient presenting today at 29w1d for evaluation of decreased fetal movement.    No LMP recorded. Patient is pregnant.  Estimated Date of Delivery: Mar 23, 2021     HPI  ==================   Pt presents to triage with report of decreased fetal movement since 1500 today. Has felt some movement but feels like it is less than normal. Also reports intermittent RLQ pain. Describes as sharp and occurs mostly with moving from a sitting to standing position. Improved at rest. Description c/w round ligament pain.    Denies cramping or contractions, vaginal bleeding, discharge or leakage of fluid.     Pregnancy significant for fetal growth restriction. Diagnosed by MFM on 20- EFW 9%tile, AC 8%tile.   Had MFM ultrasound today with finding of mild UA doppler abnormality- intermittently increased pulsatile index. Pt had a reactive NST. Recommendation for 2x/week BPP, however, pt stated d/t , she can only come 1x/week.   Also had [facility] appointment today.     Denies fever, cough, SOB or chest pain Denies having contact with anyone who is Covid-19 positive.     Prenatal record and labs reviewed from [facility], through [facility] EMR.    CONTRACTIONS: none  ABDOMINAL PAIN: right side round ligament pain with certain movements  FETAL MOVEMENT: decreased since 1500    VAGINAL BLEEDING: none  RUPTURE OF MEMBRANES: no  PELVIC PAIN: round ligament pain    PREGNANCY COMPLICATIONS:   - FGR this pregnancy  - hx of fgr   - hx of  followed  By c/s x 2- desires tolac  1st c/s for hand presentation, 2nd scheduled repeat FGR    REVIEW OF SYSTEMS  =====================  C: NEGATIVE for fever, chills  I: NEGATIVE for worrisome rashes, moles or lesions  E: NEGATIVE for vision changes or irritation  R: NEGATIVE for significant cough or SOB  CV: NEGATIVE for chest pain, palpitations or varicosities  GI:  NEGATIVE for nausea, abdominal pain, heartburn, or change in bowel habits  : NEGATIVE for frequency, dysuria, or hematuria  M: NEGATIVE for significant arthralgias or myalgia  N: NEGATIVE for headache, weakness, dizziness or paresthesias  P: NEGATIVE for changes in mood or affect    PROBLEM LIST  ===============  Patient Active Problem List    Diagnosis Date Noted     Labor and delivery indication for care or intervention 2021     Priority: Medium     Pregnancy affected by fetal growth restriction 2020     Priority: Medium      US at 298+1 weeks: Fetal Growth Restriction> MFM recommendation: Given FGR, recommend initiation to NST to closely monitor fetal status. Rajan states she is not able to stay today for NST, but will return next week to initiate  surveillance with weekly NST, amniotic fluid and UA Doppler assessment. If abnormal UA Doppler is noted at the next US, we reviewed that if there is increased resistance  to flow, then  surveillance is recommended to increase to twice weekly. A repeat assessment of fetal growth will also be scheduled here in 3 weeks.       Back pain 2020     Priority: Medium     Previous  delivery affecting pregnancy 2019     Priority: Medium     S/P  section 2019     Priority: Medium     Encounter for triage in pregnant patient 2019     Priority: Medium     Previous  delivery, antepartum condition or complication 2018     Priority: Medium     Est CNM pt.  Vaginal delivery  of 7 lbs 4 oz    then C/S for compound presentation (hand in front of head) in .   19 [facility] U/S;  Posterior placenta, 19 0/7 weeks PANCHITO remains 19, nl fetal survey  3/27/19 MFM U/S;  28 5/7 weeks, 33%tile growth, 7% AC, will have f/u growth on 19.   5/3/19 MFM U/S;  34 0/7 weeks, BPP 8/8, 12%tile growth, AC < 5%tile, EFW; 3 lbs 15 oz, recommend delivery 38-39 weeks.  19 MFM U/S; 36 5/7 weeks BPP 8/8  7 %tile growth AC <1%tile EFW 5 lbs 1 oz       Asthma with allergic rhinitis      Priority: Medium     Albuterol inhaler prn--worse in the summer         HISTORIES  ==============  ALLERGIES:      Allergies   Allergen Reactions     Latex Hives     PAST MEDICAL HISTORY  Past Medical History:   Diagnosis Date     Asthma with allergic rhinitis     Albuterol inhaler prn--worse in the summer     Wounds and injuries 1999    Broken arm     SOCIAL HISTORY  Social History     Socioeconomic History     Marital status: Single     Spouse name: Not on file     Number of children: Not on file     Years of education: Not on file     Highest education level: Not on file   Occupational History     Not on file   Social Needs     Financial resource strain: Not on file     Food insecurity     Worry: Not on file     Inability: Not on file     Transportation needs     Medical: Not on file     Non-medical: Not on file   Tobacco Use     Smoking status: Current Every Day Smoker     Packs/day: 0.25     Types: Cigarettes     Smokeless tobacco: Never Used   Substance and Sexual Activity     Alcohol use: No     Drug use: No     Sexual activity: Yes     Partners: Male     Birth control/protection: None     Comment: pregnant   Lifestyle     Physical activity     Days per week: Not on file     Minutes per session: Not on file     Stress: Not on file   Relationships     Social connections     Talks on phone: Not on file     Gets together: Not on file     Attends [employer] service: Not on file     Active member of club or organization: Not on file     Attends meetings of clubs or organizations: Not on file     Relationship status: Not on file     Intimate partner violence     Fear of current or ex partner: Not on file     Emotionally abused: Not on file     Physically abused: Not on file     Forced sexual activity: Not on file   Other Topics Concern     Parent/sibling w/ CABG, MI or angioplasty before 65F 55M? Not Asked   Social History Narrative      Not on file     PARTNER: at bedside    FAMILY HISTORY  Family History   Problem Relation Age of Onset     Asthma Son      OB HISTORY  OB History    Para Term  AB Living   4 3 3 0 0 3   SAB TAB Ectopic Multiple Live Births   0 0 0 0 3      # Outcome Date GA Lbr Roque/2nd Weight Sex Delivery Anes PTL Lv   4 Current            3 Term 19 39w0d  2.45 kg (5 lb 6.4 oz) M CS-LTranv Spinal N RONEN      Name: ACOSTA,MALE-SHAZIA      Apgar1: 9  Apgar5: 9   2 Term 03/26/15 38w4d  2.155 kg (4 lb 12 oz) F CS-LTranv None N RONEN      Complications: Compound presentation of fetus      Apgar1: 9  Apgar5: 9   1 Term 11 40w3d 01:30 / 00:08 3.289 kg (7 lb 4 oz) M Vag-Spont Local N RONEN      Name: ACOSTABOY BABY      Apgar1: 9  Apgar5: 9     Prenatal Labs:   Lab Results   Component Value Date    ABO O 2019    RH Pos 2019    AS Neg 2019    RUQIGG 110 2018    HEPBANG Nonreactive 2018    TREPAB Negative 2014    HGB 13.3 2020    HIAGAB Nonreactive 2018    GLU1 91 2019       ULTRASOUND(s) reviewed: in epic    EXAM  ============  /54   Pulse 85   Temp 98.3  F (36.8  C) (Oral)   Resp 16   GENERAL APPEARANCE: healthy, alert and no distress  RESP: lungs clear to auscultation - no rales, rhonchi or wheezes  CV: regular rates and rhythm, normal S1 S2, no S3 or S4 and no murmur,and no varicosities  ABDOMEN:  soft, nontender, no epigastric pain  SKIN: no suspicious lesions or rashes  NEURO: Denies headache, blurred vision, other vision changes  PSYCH: mentation appears normal. and affect normal/bright  MS/ LEGS: Reflexes normal bilaterally    CONTRACTIONS: none   FETAL HEART TONES: continuous EFM- baseline 145 with moderate variability and positive accelerations. 4-5 decelerations, not associated with contractions.  Late and prolonged in appearance.     Sterile spec exam:  - Cervix visually appears closed, small amt clumped white discharge in vault,  No  bleeding  SVE: closed/long/high, post/firm    PRESENTATION: VERTEX, more to maternal right  BLOOD: no  DISCHARGE: small white    ROM: no  ROMPLUS: not done    LABS: Wet prep, gc/ct, UA/UC  Lab results reviewed- wet prep neg, UA neg, gc/ct and UC pending    DIAGNOSIS  ============  29w1d [facility] on the [facility] Triage, decreased fetal movement    Patient Active Problem List   Diagnosis     Asthma with allergic rhinitis     Previous  delivery, antepartum condition or complication     Encounter for triage in pregnant patient     Previous  delivery affecting pregnancy     S/P  section     Back pain     Pregnancy affected by fetal growth restriction     Labor and delivery indication for care or intervention       PLAN  ============  - Reviewed discomfort c/w round ligament pain, especially with position of fetal head more on maternal right.   Discussed comfort measures. Pt has maternity belt at home.    - Reviewed fetal tracing. Concern re: 4-5 decelerations in heart rate, especially in the setting of decreased fetal movement, FGR and abnormal dopplers on ultrasound .    Recommended observation overnight with evaluation in AM and consultation with MFM.   Pt agreeable.    - Admitted to observation.   - COVID test ordered.    - Continuous EFM and TOCO.     JENIFFER Mitchell, CNM

## 2021-01-07 NOTE — PLAN OF CARE
Pt able to rest since transfer at 0200. Cat I FHR. No contractions. Will continue to monitor. Drug screen positive for cannabinoids.

## 2021-01-07 NOTE — PROGRESS NOTES
Patient rested overnight.     , moderate variability, +accelerations. No further decelerations noted.   No contractions.     Report given to CHERYL Mcghee.    Will review with FABIEN SANDOVAL to determine plan of care.     JNEIFFER Mitchell, CHERYL

## 2021-01-07 NOTE — PROGRESS NOTES
Blood pressure 131/59, pulse 85, temperature 97.9  F (36.6  C), temperature source Oral, resp. rate 16, unknown if currently breastfeeding.  Patient Vitals for the past 24 hrs:   BP Temp Temp src Pulse Resp   01/07/21 0809 131/59 97.9  F (36.6  C) Oral -- 16   01/07/21 0606 112/54 98.3  F (36.8  C) Oral -- 16   01/07/21 0200 123/67 97.6  F (36.4  C) Oral 85 16   01/06/21 2226 121/60 98.4  F (36.9  C) Oral 77 16     General appearance: comfortable  Pt has been on observation for decreased FM and noted to have three spont decelerations and FHT have been wnl since.  Pt had MFM US yesterday with IUGR and abnormal dopplers noted  Pt feeling baby move now.   C/o constipation and feels like she needs to have BM but unable to. Last BM two days ago. Has Miralax at home but has not been taking because the constipation has gotten better.  CONTACTIONS: none and not feeling cramping. abd nontender  FETAL HEART TONES: continuous EFM- baseline 145 with moderate variability and positive 10x10 accelerations, No decelerations.  ROM: not ruptured, no bleeding  PELVIC EXAM:deferred    # Pain Assessment:  Current Pain Score 1/7/2021   Patient currently in pain? denies   Pain score (0-10) -   Pain location -   Pain descriptors -   Rajan galicia pain level was assessed and she currently denies pain.        Results for orders placed or performed during the hospital encounter of 01/06/21 (from the past 24 hour(s))   UA with Microscopic reflex to Culture    Specimen: Urine   Result Value Ref Range    Color Urine Yellow     Appearance Urine Clear     Glucose Urine Negative NEG^Negative mg/dL    Bilirubin Urine Negative NEG^Negative    Ketones Urine Negative NEG^Negative mg/dL    Specific Gravity Urine 1.012 1.003 - 1.035    Blood Urine Negative NEG^Negative    pH Urine 6.5 5.0 - 7.0 pH    Protein Albumin Urine Negative NEG^Negative mg/dL    Urobilinogen mg/dL Normal 0.0 - 2.0 mg/dL    Nitrite Urine Negative NEG^Negative    Leukocyte Esterase Urine  Negative NEG^Negative    Source Urine     WBC Urine <1 0 - 5 /HPF    RBC Urine 0 0 - 2 /HPF    Squamous Epithelial /HPF Urine <1 0 - 1 /HPF    Mucous Urine Present (A) NEG^Negative /LPF   Urine Culture    Specimen: Urine clean catch; Midstream Urine   Result Value Ref Range    Specimen Description Midstream Urine     Special Requests Specimen received in preservative     Culture Micro PENDING    Wet prep    Specimen: Vaginal Rectal   Result Value Ref Range    Specimen Description Vaginal Rectal     Wet Prep No motile Trichomonas seen     Wet Prep No yeast seen     Wet Prep Few  PMNs seen       Wet Prep No clue cells seen    Asymptomatic SARS-CoV-2 COVID-19 Virus (Coronavirus) by PCR    Specimen: Nasopharyngeal   Result Value Ref Range    SARS-CoV-2 Virus Specimen Source Nasopharyngeal     SARS-CoV-2 PCR Result NEGATIVE     SARS-CoV-2 PCR Comment (Note)    Drug abuse scrn 7 UR (/) (RH, SH, UR)   Result Value Ref Range    Amphetamine Qual Urine Negative NEG^Negative    Cannabinoids Qual Urine Positive, sent to Drug Analysis for confirmation. (A) NEG^Negative    Cocaine Qual Urine Negative NEG^Negative    Opiates Qualitative Urine Negative NEG^Negative    Pcp Qual Urine Negative NEG^Negative   Creatinine random urine   Result Value Ref Range    Creatinine Urine Random 51 mg/dL   Creatinine urine calculation only   Result Value Ref Range    Creatinine Urine 51 mg/dL     Lab results reviewed- yes    ASSESSMENT:  ==============  IUP @ 29w2d for decreased fetal movement  with known IUGR   Fetal Heart Tones: category one x 7hours  Patient Active Problem List   Diagnosis     Asthma with allergic rhinitis     Previous  delivery, antepartum condition or complication     Encounter for triage in pregnant patient     Previous  delivery affecting pregnancy     S/P  section     Back pain     Pregnancy affected by fetal growth restriction     Labor and delivery indication for care or  intervention       PLAN:  ===========  Consult with MFM Dr Harman. Plan for pt to have betamethasone now and repeat tomorrow. May be discharged to home with Follow-up MFM ultrasound 1/13/20.  Reinforced FMC and to return to [facility]place with decreased FM  Suppository now and recommend daily miralax which pt has at home.  Call or return to the [facility] with contractions, cramping, abdominal or pelvic pain, vaginal bleeding, leaking fluid or decreased fetal movement.  Follow up- pt has trouble making appts with children at home, plan to transfer her care to [facility] from [facility] to coordinate visits with MFM. Pt agreeable to plan  GCT at next visit.  UDS at delivery  Home undelivered after betamethasone  JENIFFER AldridgeM

## 2021-01-07 NOTE — PLAN OF CARE
Data: [facility] the [facility] at 2215 21.   Reason for maternal/fetal assessment per patient is Abdominal Pain  . Patient is a . Prenatal record reviewed.      OB History    Para Term  AB Living   4 3 3 0 0 3   SAB TAB Ectopic Multiple Live Births   0 0 0 0 3      # Outcome Date GA Lbr Roque/2nd Weight Sex Delivery Anes PTL Lv   4 Current            3 Term 19 39w0d  2.45 kg (5 lb 6.4 oz) M CS-LTranv Spinal N RONEN      Name: MARY SHANE-SHAZIA      Apgar1: 9  Apgar5: 9   2 Term 03/26/15 38w4d  2.155 kg (4 lb 12 oz) F CS-LTranv None N RONEN      Complications: Compound presentation of fetus      Apgar1: 9  Apgar5: 9   1 Term 11 40w3d 01:30 / 00:08 3.289 kg (7 lb 4 oz) M Vag-Spont Local N RONEN      Name: ACOSTA,BOY BABY      Apgar1: 9  Apgar5: 9      Medical History:   Past Medical History:   Diagnosis Date    Asthma with allergic rhinitis     Albuterol inhaler prn--worse in the summer    Wounds and injuries     Broken arm   . Gestational Age 29w2d. VSS. Cervix: closed.  Fetal movement present. Patient denies cramping, backache, vaginal discharge, pelvic pressure, UTI symptoms, GI problems, bloody show, vaginal bleeding, edema, headache, visual disturbances, epigastric or URQ pain, abdominal pain, rupture of membranes. Support persons  present.  Action: Verbal consent for EFM. Triage assessment completed. EFM applied for fetal well being. Uterine assessment no contractions. Fetal assessment: Presumed adequate fetal oxygenation documented (see flow record). Patient education pamphlets given on fetal movement counts and discharge instructions. Patient knows to follow up at the [facility] tomorrow at 0930 for her second beta injection.[facility] has called patient and made a follow up appointment. Patient denies any needs at this time. Patient instructed to report change in fetal movement, vaginal leaking of fluid or bleeding, abdominal pain, or any concerns related  to the pregnancy to her nurse/physician.   Response: MAGALI Alvarez CNM plan per provider is to discharge patient to home with follow up plan, see provider's notes. Patient verbalized understanding of education and verbalized agreement with plan. Discharged ambulatory.

## 2021-01-07 NOTE — DISCHARGE INSTRUCTIONS
Discharge Instruction for Undelivered Patients      You were seen for: Fetal Assessment  We Consulted: [facility]       Diet:   Drink 8 to 12 glasses of liquids (milk, juice, water) every day.  You may eat meals and snacks.     Activity:  Count fetal kicks everyday (see handout)  Call your doctor or nurse midwife if your baby is moving less than usual.     Call your provider if you notice:  Swelling in your face or increased swelling in your hands or legs.  Headaches that are not relieved by Tylenol (acetaminophen).  Changes in your vision (blurring: seeing spots or stars.)  Nausea (sick to your stomach) and vomiting (throwing up).   Weight gain of 5 pounds or more per week.  Heartburn that doesn't go away.  Signs of bladder infection: pain when you urinate (use the toilet), need to go more often and more urgently.  The bag of mott (rupture of membranes) breaks, or you notice leaking in your underwear.  Bright red blood in your underwear.  Abdominal (lower belly) or stomach pain.  For first baby: Contractions (tightening) less than 5 minutes apart for one hour or more.  Second (plus) baby: Contractions (tightening) less than 10 minutes apart and getting stronger.  *If less than 34 weeks: Contractions (tightenings) more than 6 times in one hour.  Increase or change in vaginal discharge (note the color and amount)  Other:     Follow-up:  As scheduled in the clinic        Kick Counts    It s normal to worry about your baby s health. One way you can know your baby s doing well is to record the baby s movements once a day. This is called a kick count. Remember to take your kick count records to all your appointments with your healthcare provider.  How to count kicks  Time how long it takes you to feel 10 kicks, flutters, swishes, or rolls. Ideally, you want to feel at least 10 movements within 2 hours. You will likely feel 10 movements in less time than that.  Starting at 28 weeks, count your baby's movements daily.  Follow your healthcare provider's instructions for kick counting. Here are tips for counting kicks:    Choose a time when the baby is active, such as after a meal.     Sit comfortably or lie on your side.     The first time the baby moves, write down the time.     Count each movement until the baby has moved 10 times. This can take from 20 minutes to 2 hours.     If you have not felt 10 kicks by the end of the second hour, wait a few hours. Then try again.    Try to do it at the same time each day.  When to call your healthcare provider  Call your healthcare provider right away if:    You do a couple sets of kick counts during the day and your baby moves fewer than 10 times in 2 hours    Your baby moves much less often than on the days before.    You have not felt your baby move all day.

## 2021-01-08 ENCOUNTER — HOSPITAL ENCOUNTER (OUTPATIENT)
Facility: CLINIC | Age: 26
Discharge: HOME | End: 2021-01-08
Attending: ADVANCED PRACTICE MIDWIFE | Admitting: ADVANCED PRACTICE MIDWIFE
Payer: COMMERCIAL

## 2021-01-08 LAB
BACTERIA SPEC CULT: NORMAL
CANNABINOIDS UR CFM-MCNC: 79 NG/ML
CARBOXYTHC/CREAT UR: 155 NG/MG{CREAT}
Lab: NORMAL
SPECIMEN SOURCE: NORMAL

## 2021-01-08 PROCEDURE — G0463 HOSPITAL OUTPT CLINIC VISIT: HCPCS | Mod: 25

## 2021-01-08 PROCEDURE — 96372 THER/PROPH/DIAG INJ SC/IM: CPT | Performed by: ADVANCED PRACTICE MIDWIFE

## 2021-01-08 PROCEDURE — 59025 FETAL NON-STRESS TEST: CPT | Mod: 26 | Performed by: ADVANCED PRACTICE MIDWIFE

## 2021-01-08 PROCEDURE — 250N000011 HC RX IP 250 OP 636: Performed by: ADVANCED PRACTICE MIDWIFE

## 2021-01-08 PROCEDURE — 59025 FETAL NON-STRESS TEST: CPT

## 2021-01-08 RX ORDER — BETAMETHASONE SODIUM PHOSPHATE AND BETAMETHASONE ACETATE 3; 3 MG/ML; MG/ML
12 INJECTION, SUSPENSION INTRA-ARTICULAR; INTRALESIONAL; INTRAMUSCULAR; SOFT TISSUE ONCE
Status: COMPLETED | OUTPATIENT
Start: 2021-01-08 | End: 2021-01-08

## 2021-01-08 RX ADMIN — BETAMETHASONE SODIUM PHOSPHATE AND BETAMETHASONE ACETATE 12 MG: 3; 3 INJECTION, SUSPENSION INTRA-ARTICULAR; INTRALESIONAL; INTRAMUSCULAR at 10:38

## 2021-01-08 NOTE — LETTER
January 8, 2021        Rajan Graham & Dominic Tobin  [address] [place] [id number]    To Whom it May Concern:    Rajan Graham & Dominic Tobin were seen in the hospital on 1/6/2021-1/7/21 for pregnancy care and was discharged to home on 1/7/21, therefore missing work on these days.       Patient may continue working at this time.      Sincerely,        Madhavi Gutierres CNM

## 2021-01-08 NOTE — PLAN OF CARE
Data: [facility] at 0945.   Reason for maternal/fetal assessment per patient is NST IUGR, Beta follow up. Patient is a . Prenatal record reviewed.  Medical History:   Gestational Age 29w3d. VSS. Cervix: not examined.  Fetal movement present. Patient denies cramping, backache, vaginal discharge, pelvic pressure, UTI symptoms, GI problems, bloody show, vaginal bleeding, edema, headache, visual disturbances, epigastric or URQ pain, abdominal pain, rupture of membranes.  Action: Verbal consent for EFM. Triage assessment completed. EFM applied for FHR. Uterine assessment uterine activty. Fetal assessment: Presumed adequate fetal oxygenation documented (see flow record). Patient education pamphlets given on fetal movement counts and when to follow up at clinic. Patient instructed to report change in fetal movement, vaginal leaking of fluid or bleeding, abdominal pain, or any concerns related to the pregnancy to her nurse/physician.   Response:  CHERYL Gutierres informed of patient arrival, NST reactive, beta administered, patient has desire to discuss TOLAC and desires AC IV site at admission when appropriate. Plan per provider is discharge for follow up at clinic on Wednesday. Patient verbalized understanding of education and verbalized agreement with plan. Discharged ambulatory at 1050.

## 2021-01-08 NOTE — PROGRESS NOTES
Patient presents to triage for second dose of Betamethazone for FGR. Met with patient to introduce ourselves as part of [facility] midwife team.    Discussed upcoming appointments. Patient has all appointments registered in her phone calendar and states she knows where to go for her appointments.     Questioned if she should increase the sugar in her diet to help baby move and grow. Educated on nutrition in pregnancy and recommended limiting sugar intake. Encouraged daily kick counts and to call midwife on-call if she has questions or concerns about fetal movement.     Requesting a work note for her and her partner from recent hospitalization. Work note provided today.    Would like to discuss risks/benefits of TOLAC and potentially get consented at her visit Wed.     I, RAMSES Bello am serving as a scribe; to document services personally performed by Madhavi Gutierres CNM based on data collection and the provider's statements to me.     RAMSES Bello    I agree with the PFSH and ROS as completed by the student, except for changes made by me. The remainder of the encounter was performed by me and scribed by the student. The scribed note accurately reflects my personal services and decisions made by me.  Madhavi Gutierres DNP, CHERYL, APRN      Addendum: NST appropriate for GA. Pt is very aware of and reminded of kick-counts.

## 2021-01-08 NOTE — DISCHARGE INSTRUCTIONS
Discharge Instruction for Undelivered Patients      You were seen for: Betametasone injection  We Consulted: CHERLY Gutierres  You had (Test or Medicine) Betametasone    Diet:   Drink 8 to 12 glasses of liquids (milk, juice, water) every day.  You may eat meals and snacks.     Activity:  Count fetal kicks everyday (see handout)  Call your doctor or nurse midwife if your baby is moving less than usual.     Call your provider if you notice:  Swelling in your face or increased swelling in your hands or legs.  Headaches that are not relieved by Tylenol (acetaminophen).  Changes in your vision (blurring: seeing spots or stars.)  Nausea (sick to your stomach) and vomiting (throwing up).   Weight gain of 5 pounds or more per week.  Heartburn that doesn't go away.  Signs of bladder infection: pain when you urinate (use the toilet), need to go more often and more urgently.  The bag of mott (rupture of membranes) breaks, or you notice leaking in your underwear.  Bright red blood in your underwear.  Abdominal (lower belly) or stomach pain.  For first baby: Contractions (tightening) less than 5 minutes apart for one hour or more.  Second (plus) baby: Contractions (tightening) less than 10 minutes apart and getting stronger.  *If less than 34 weeks: Contractions (tightenings) more than 6 times in one hour.  Increase or change in vaginal discharge (note the color and amount)  Other:     Follow-up:  As scheduled in the clinic

## 2021-01-08 NOTE — LETTER
January 8, 2021        Rajan Graham & Dominic Tobin  [address] [place] [id number]    To Whom it May Concern:    Rajan Graham & Dominic Tobin were seen in the hospital on 1/6/2021-1/8/21 for pregnancy care and was discharged to home on 1/8/21, therefore missing work on these days.       Patient may continue working at this time.      Sincerely,        Madhavi Gutierres CNM

## 2021-01-13 ENCOUNTER — TELEPHONE (OUTPATIENT)
Dept: OBGYN | Facility: CLINIC | Age: 26
End: 2021-01-13

## 2021-01-13 ENCOUNTER — HOSPITAL ENCOUNTER (OUTPATIENT)
Dept: ULTRASOUND IMAGING | Facility: CLINIC | Age: 26
End: 2021-01-13
Attending: OBSTETRICS & GYNECOLOGY
Payer: COMMERCIAL

## 2021-01-13 ENCOUNTER — OFFICE VISIT (OUTPATIENT)
Dept: MATERNAL FETAL MEDICINE | Facility: CLINIC | Age: 26
End: 2021-01-13
Attending: OBSTETRICS & GYNECOLOGY
Payer: COMMERCIAL

## 2021-01-13 ENCOUNTER — VIRTUAL VISIT (OUTPATIENT)
Dept: OBGYN | Facility: CLINIC | Age: 26
End: 2021-01-13
Attending: ADVANCED PRACTICE MIDWIFE
Payer: COMMERCIAL

## 2021-01-13 VITALS
HEART RATE: 97 BPM | SYSTOLIC BLOOD PRESSURE: 121 MMHG | WEIGHT: 173 LBS | DIASTOLIC BLOOD PRESSURE: 81 MMHG | BODY MASS INDEX: 27.15 KG/M2 | HEIGHT: 67 IN

## 2021-01-13 DIAGNOSIS — O09.90 HIGH-RISK PREGNANCY, UNSPECIFIED TRIMESTER: ICD-10-CM

## 2021-01-13 DIAGNOSIS — O09.90 HIGH-RISK PREGNANCY, UNSPECIFIED TRIMESTER: Primary | ICD-10-CM

## 2021-01-13 DIAGNOSIS — O36.5990 PREGNANCY AFFECTED BY FETAL GROWTH RESTRICTION: ICD-10-CM

## 2021-01-13 DIAGNOSIS — O09.93 SUPERVISION OF HIGH RISK PREGNANCY IN THIRD TRIMESTER: ICD-10-CM

## 2021-01-13 DIAGNOSIS — O99.810 ABNORMAL GLUCOSE TOLERANCE TEST DURING PREGNANCY, ANTEPARTUM: ICD-10-CM

## 2021-01-13 PROBLEM — M54.9 BACK PAIN: Status: RESOLVED | Noted: 2020-09-30 | Resolved: 2021-01-13

## 2021-01-13 LAB
DEPRECATED CALCIDIOL+CALCIFEROL SERPL-MC: 17 UG/L (ref 20–75)
GLUCOSE 1H P 50 G GLC PO SERPL-MCNC: 135 MG/DL (ref 60–129)

## 2021-01-13 PROCEDURE — 76820 UMBILICAL ARTERY ECHO: CPT | Mod: 26 | Performed by: OBSTETRICS & GYNECOLOGY

## 2021-01-13 PROCEDURE — 76818 FETAL BIOPHYS PROFILE W/NST: CPT | Mod: 26 | Performed by: OBSTETRICS & GYNECOLOGY

## 2021-01-13 PROCEDURE — 59025 FETAL NON-STRESS TEST: CPT | Performed by: OBSTETRICS & GYNECOLOGY

## 2021-01-13 PROCEDURE — 36415 COLL VENOUS BLD VENIPUNCTURE: CPT | Performed by: ADVANCED PRACTICE MIDWIFE

## 2021-01-13 PROCEDURE — 99213 OFFICE O/P EST LOW 20 MIN: CPT | Mod: 95 | Performed by: MIDWIFE

## 2021-01-13 PROCEDURE — 82950 GLUCOSE TEST: CPT | Performed by: ADVANCED PRACTICE MIDWIFE

## 2021-01-13 PROCEDURE — 76818 FETAL BIOPHYS PROFILE W/NST: CPT

## 2021-01-13 PROCEDURE — 86780 TREPONEMA PALLIDUM: CPT | Mod: TEL | Performed by: MIDWIFE

## 2021-01-13 PROCEDURE — 82306 VITAMIN D 25 HYDROXY: CPT | Mod: TEL | Performed by: MIDWIFE

## 2021-01-13 PROCEDURE — 999N001193 HC VIDEO/TELEPHONE VISIT; NO CHARGE

## 2021-01-13 RX ORDER — VITAMIN A ACETATE, BETA CAROTENE, ASCORBIC ACID, CHOLECALCIFEROL, .ALPHA.-TOCOPHEROL ACETATE, DL-, THIAMINE MONONITRATE, RIBOFLAVIN, NIACINAMIDE, PYRIDOXINE HYDROCHLORIDE, FOLIC ACID, CYANOCOBALAMIN, CALCIUM CARBONATE, FERROUS FUMARATE, ZINC OXIDE, CUPRIC OXIDE 3080; 12; 120; 400; 1; 1.84; 3; 20; 22; 920; 25; 200; 27; 10; 2 [IU]/1; UG/1; MG/1; [IU]/1; MG/1; MG/1; MG/1; MG/1; MG/1; [IU]/1; MG/1; MG/1; MG/1; MG/1; MG/1
1 TABLET, FILM COATED ORAL PRN
COMMUNITY
Start: 2020-07-13 | End: 2021-11-29

## 2021-01-13 ASSESSMENT — MIFFLIN-ST. JEOR: SCORE: 1557.47

## 2021-01-13 ASSESSMENT — PAIN SCALES - GENERAL: PAINLEVEL: NO PAIN (0)

## 2021-01-13 NOTE — PROGRESS NOTES
Please see the imaging tab for details of the ultrasound performed today.    Lynn Rose MD  Specialist in Maternal-Fetal Medicine

## 2021-01-13 NOTE — PROGRESS NOTES
"Raajn Graham is a 25 year old female who is being evaluated via a billable telephone visit.    The patient has been notified of following:     \"This telephone visit will be conducted via a call between you and your physician/provider. We have found that certain health care needs can be provided without the need for a physical exam.  This service lets us provide the care you need with a short phone conversation.  If a prescription is necessary we can send it directly to your pharmacy.  If lab work is needed we can place an order for that and you can then stop by our lab to have the test done at a later time.    If during the course of the call the physician/provider feels a telephone visit is not appropriate, you will not be charged for this service.\"     Rajan Graham   Chief Complaint   Patient presents with     Prenatal Care       I have reviewed and updated the patient's Past Medical History, Social History, Family History and Medication List.    ALLERGIES  Latex      25 year old, , 30w1d,   The patient presents with the following concerns: pt was late for clinic appt  Given glucola and sent to her [facility] US appt per clinic RN    US results reviewed pt is home now  BPP   Plan Continue  surveillance with weekly NST, amniotic fluid and UA Doppler assessment. If there is persistent abnormal UA Doppler then readdress recommendation to  increase to twice weekly  surveillance.   reviewed failed GCT  Plan fasting 3 hr GTT  at 8 am   Ok with phone visit now   PHQ-9 SCORE 3/11/2015 2018 5/15/2019   PHQ-9 Total Score 1 - -   PHQ-9 Total Score - 7 7     Education completed today includes breast feeding, [facility] hand out , contraception, counting movements, signs of pre-term labor and when to present to birthplace.  Birth preferences reviewed: Un-Medicated labored at home to 9cm   Labor support:  KAROLINA JOSEPH    [place] Feeding plans :    Contraception planned:  " PPTL  Needs consent form 1/20  The following labs were ordered today:       GCT and Vitamin D  Unable to add on CBC and anti trep only GCT ordered    Water birth consent form was not given.  Blood type:   ABO   Date Value Ref Range Status   06/05/2019 O  Final     RH(D)   Date Value Ref Range Status   06/05/2019 Pos  Final     Antibody Screen   Date Value Ref Range Status   06/05/2019 Neg  Final   , Rhogam  was not given.  TDAP  was notgiven.  NEEDs at next visit   A/P:  Encounter Diagnoses   Name Primary?     High-risk pregnancy, unspecified trimester Yes     Pregnancy affected by fetal growth restriction      Abnormal glucose tolerance test during pregnancy, antepartum      Supervision of high risk pregnancy in third trimester      Orders Placed This Encounter   Procedures     25- OH-Vitamin D     Glucose 3 Hour     CBC with platelets     Treponema Abs w Reflex to RPR and Titer     Orders Placed This Encounter   Medications     Prenatal Vit-Fe Fumarate-FA (PRENATAL PLUS) 27-1 MG TABS     Sig: Take 1 tablet by mouth as needed       PLAN FOR VISIT 1/20   NEEDS TOLAC CONSENT REVIEWED< PPTL consent    NEEDS TDAP   WANTS BREAST PUMP RX next visit   Will go to lab 8 am on 1/20 for fasting 3 hr GTT      Continue scheduled prenatal care  Phone call duration:  25 minutes    JENIFFER Estevez CNM

## 2021-01-13 NOTE — PATIENT INSTRUCTIONS
Thank you for choosing [facility] ([facility] for your obstetrical care.  We are an integrated health clinic with obstetricians, midwives, a psychologist, an acupuncturist, massage, a nutritionist,a pharmacist, internal medicine and family practice all in one clinic.  If you have questions about services offered please ask.    Keep all appointments with your provider.  You will help catch, prevent and treat problems early.    Review your Expectant Family booklet given to you at this intake visit.  It can answer many basic questions including:    Treatment for nausea and vomiting p.22    Medications that are safe in pregnancy (see handout)    Healthy diet and weight gain p.7-8    Exercise and activity p.9    ASK questions!!  Please write down any questions or concerns for your next visit.    Eat a healthy diet.  Visit [email or web address].gov and click on Pregnancy and Breastfeeding for information and tips    Do not smoke.  Avoid other people's smoke, too.  We are happy to help with referrals to stop smoking programs.    Do not drink alcohol.    Try to avoid people who have colds or other infections.  Practice good hand washing.    Consider registering for prenatal education classes through [employer] at  [facility].  You can view class schedules and register online at [email or web address] or call [phone] for questions    For urgent concerns call [facility] at [phone]  to talk with a triage nurse during regular clinic hours (8am-4:30pm).  This line is answered by our service 24 hours a day, after hours a provider will return your call.  Discharge Instructions for COVID-19 Patients  You have--or may have--COVID-19. Please follow the instructions listed below.   If you have a weakened immune system, discuss with your doctor any other actions you need to take.  How can I protect others?  If you have symptoms (fever, cough, body aches or trouble breathing):    Stay home and away  "from others (self-isolate) until:  ? Your other symptoms have resolved (gotten better). And   ? You've had no fever--and no medicine that reduces fever--for 1 full day (24 hours). And   ? At least 10 days have passed since your symptoms started. (You may need to wait 20 days. Follow the advice of your care team.)  If you don't show symptoms, but testing showed that you have COVID-19:    Stay home and away from others (self-isolate) until at least 10 days have passed since the date of your first positive COVID-19 test.  During this time    Stay in your own room, even for meals. Use your own bathroom if you can.    Stay away from others in your home. No hugging, kissing or shaking hands. No visitors.    Don't go to work, school or anywhere else.    Clean \"high touch\" surfaces often (doorknobs, counters, handles). Use household cleaning spray or wipes.    You'll find a full list of  on the EPA website: www.epa.gov/pesticide-registration/list-n-disinfectants-use-against-sars-cov-2.    Cover your mouth and nose with a mask or other face covering to avoid spreading germs.    Wash your hands and face often. Use soap and water.    Caregivers in these groups are at risk for severe illness due to COVID-19:  ? People 65 years and older  ? People who live in a nursing home or long-term care facility  ? People with chronic disease (lung, heart, cancer, diabetes, kidney, liver, immunologic)  ? People who have a weakened immune system, including those who:    Are in cancer treatment    Take medicine that weakens the immune system, such as corticosteroids    Had a bone marrow or organ transplant    Have an immune deficiency    Have poorly controlled HIV or AIDS    Are obese (body mass index of 40 or higher)    Smoke regularly    Caregivers should wear gloves while washing dishes, handling laundry and cleaning bedrooms and bathrooms.    Use caution when washing and drying laundry: Don't shake dirty laundry and use the " warmest water setting that you can.    For more tips on managing your health at home, go to www.cdc.gov/coronavirus/2019-ncov/downloads/10Things.pdf.  How can I take care of myself at home?  1. Get lots of rest. Drink extra fluids (unless a doctor has told you not to).  2. Take Tylenol (acetaminophen) for fever or pain. If you have liver or kidney problems, ask your family doctor if it's okay to take Tylenol.   Adults can take either:   ? 650 mg (two 325 mg pills) every 4 to 6 hours, or   ? 1,000 mg (two 500 mg pills) every 8 hours as needed.  ? Note: Don't take more than 3,000 mg in one day. Acetaminophen is found in many medicines (both prescribed and over-the-counter medicines). Read all labels to be sure you don't take too much.   For children, check the Tylenol bottle for the right dose. The dose is based on the child's age or weight.  3. If you have other health problems (like cancer, heart failure, an organ transplant or severe kidney disease): Call your specialty clinic if you don't feel better in the next 2 days.  4. Know when to call 911. Emergency warning signs include:  ? Trouble breathing or shortness of breath  ? Pain or pressure in the chest that doesn't go away  ? Feeling confused like you haven't felt before, or not being able to wake up  ? Bluish-colored lips or face  5. Your doctor may have prescribed a blood thinner medicine. Follow their instructions.  Where can I get more information?    [facility] - About COVID-19: [email or web address]    CDC - What to Do If You're Sick: www.cdc.gov/coronavirus/2019-ncov/about/steps-when-sick.html    CDC - Ending Home Isolation: www.cdc.gov/coronavirus/2019-ncov/hcp/disposition-in-home-patients.html    CDC - Caring for Someone: www.cdc.gov/coronavirus/2019-ncov/if-you-are-sick/care-for-someone.html    [facility] - Interim Guidance for Hospital Discharge to Home: www.[facility].mn./diseases/coronavirus/hcp/hospdischarge.pdf    Below are the  "COVID-19 hotlines at the [place] Department of Health ([facility]). Interpreters are available.  ? For health questions: Call 719-149-8516 or 1-731.653.6285 (7 a.m. to 7 p.m.)  ? For questions about schools and childcare: Call 593-453-1879 or 1-314.468.4840 (7 a.m. to 7 p.m.)    For informational purposes only. Not to replace the advice of your health care provider. Clinically reviewed by Dr. Alonso Avalos.   Copyright   2020 [facility]. All rights reserved. [employer] 680363 - REV 01/05/21.  Discharge Instructions for COVID-19 Patients  You have--or may have--COVID-19. Please follow the instructions listed below.   If you have a weakened immune system, discuss with your doctor any other actions you need to take.  How can I protect others?  If you have symptoms (fever, cough, body aches or trouble breathing):    Stay home and away from others (self-isolate) until:  ? Your other symptoms have resolved (gotten better). And   ? You've had no fever--and no medicine that reduces fever--for 1 full day (24 hours). And   ? At least 10 days have passed since your symptoms started. (You may need to wait 20 days. Follow the advice of your care team.)  If you don't show symptoms, but testing showed that you have COVID-19:    Stay home and away from others (self-isolate) until at least 10 days have passed since the date of your first positive COVID-19 test.  During this time    Stay in your own room, even for meals. Use your own bathroom if you can.    Stay away from others in your home. No hugging, kissing or shaking hands. No visitors.    Don't go to work, school or anywhere else.    Clean \"high touch\" surfaces often (doorknobs, counters, handles). Use household cleaning spray or wipes.    You'll find a full list of  on the EPA website: www.epa.gov/pesticide-registration/list-n-disinfectants-use-against-sars-cov-2.    Cover your mouth and nose with a mask or other face covering to avoid spreading germs.    Wash " your hands and face often. Use soap and water.    Caregivers in these groups are at risk for severe illness due to COVID-19:  ? People 65 years and older  ? People who live in a nursing home or long-term care facility  ? People with chronic disease (lung, heart, cancer, diabetes, kidney, liver, immunologic)  ? People who have a weakened immune system, including those who:    Are in cancer treatment    Take medicine that weakens the immune system, such as corticosteroids    Had a bone marrow or organ transplant    Have an immune deficiency    Have poorly controlled HIV or AIDS    Are obese (body mass index of 40 or higher)    Smoke regularly    Caregivers should wear gloves while washing dishes, handling laundry and cleaning bedrooms and bathrooms.    Use caution when washing and drying laundry: Don't shake dirty laundry and use the warmest water setting that you can.    For more tips on managing your health at home, go to www.cdc.gov/coronavirus/2019-ncov/downloads/10Things.pdf.  How can I take care of myself at home?  6. Get lots of rest. Drink extra fluids (unless a doctor has told you not to).  7. Take Tylenol (acetaminophen) for fever or pain. If you have liver or kidney problems, ask your family doctor if it's okay to take Tylenol.   Adults can take either:   ? 650 mg (two 325 mg pills) every 4 to 6 hours, or   ? 1,000 mg (two 500 mg pills) every 8 hours as needed.  ? Note: Don't take more than 3,000 mg in one day. Acetaminophen is found in many medicines (both prescribed and over-the-counter medicines). Read all labels to be sure you don't take too much.   For children, check the Tylenol bottle for the right dose. The dose is based on the child's age or weight.  8. If you have other health problems (like cancer, heart failure, an organ transplant or severe kidney disease): Call your specialty clinic if you don't feel better in the next 2 days.  9. Know when to call 911. Emergency warning signs  include:  ? Trouble breathing or shortness of breath  ? Pain or pressure in the chest that doesn't go away  ? Feeling confused like you haven't felt before, or not being able to wake up  ? Bluish-colored lips or face  10. Your doctor may have prescribed a blood thinner medicine. Follow their instructions.  Where can I get more information?    [facility] - About COVID-19: [email or web address]    CDC - What to Do If You're Sick: www.cdc.gov/coronavirus/2019-ncov/about/steps-when-sick.html    CDC - Ending Home Isolation: www.cdc.gov/coronavirus/2019-ncov/hcp/disposition-in-home-patients.html    CDC - Caring for Someone: www.cdc.gov/coronavirus/2019-ncov/if-you-are-sick/care-for-someone.html    [facility] - Interim Guidance for Hospital Discharge to Home: www.health.[facility].mn.us/diseases/coronavirus/hcp/hospdischarge.pdf    Below are the COVID-19 hotlines at the [facility] ([facility]). Interpreters are available.  ? For health questions: Call 458-399-7435 or 1-509.259.8878 (7 a.m. to 7 p.m.)  ? For questions about schools and childcare: Call 065-222-9541 or 1-736.608.1142 (7 a.m. to 7 p.m.)    For informational purposes only. Not to replace the advice of your health care provider. Clinically reviewed by Dr. Alonso Avalos.   Copyright   2020 [facility]. All rights reserved. [employer] [id number] - REV 01/05/21.

## 2021-01-13 NOTE — TELEPHONE ENCOUNTER
Lab was informing that only the Vit D could be added on to the current lab drawn.  The CBC and Trepanemia could not be drawn and will be placed as a future.

## 2021-01-13 NOTE — LETTER
"2021       RE: Rajan Graham  [address]  [place] [address]     Dear Colleague,    Thank you for referring your patient, Rajan Graham, to the [facility] [place] [facility]. Please see a copy of my visit note below.    Rajan Graham is a 25 year old female who is being evaluated via a billable telephone visit.    The patient has been notified of following:     \"This telephone visit will be conducted via a call between you and your physician/provider. We have found that certain health care needs can be provided without the need for a physical exam.  This service lets us provide the care you need with a short phone conversation.  If a prescription is necessary we can send it directly to your pharmacy.  If lab work is needed we can place an order for that and you can then stop by our lab to have the test done at a later time.    If during the course of the call the physician/provider feels a telephone visit is not appropriate, you will not be charged for this service.\"     Rajan Graham   Chief Complaint   Patient presents with     Prenatal Care       I have reviewed and updated the patient's Past Medical History, Social History, Family History and Medication List.    ALLERGIES  Latex      25 year old, , 30w1d,   The patient presents with the following concerns: pt was late for clinic appt  Given glucola and sent to her [facility] US appt per clinic RN    US results reviewed pt is home now  BPP   Plan Continue  surveillance with weekly NST, amniotic fluid and UA Doppler assessment. If there is persistent abnormal UA Doppler then readdress recommendation to  increase to twice weekly  surveillance.   reviewed failed GCT  Plan fasting 3 hr GTT  at 8 am   Ok with phone visit now   PHQ-9 SCORE 3/11/2015 2018 5/15/2019   PHQ-9 Total Score 1 - -   PHQ-9 Total Score - 7 7 "     Education completed today includes breast feeding, [facility] hand out , contraception, counting movements, signs of pre-term labor and when to present to birthplace.  Birth preferences reviewed: Un-Medicated labored at home to 9cm   Labor support:  KAROLINA JOSEPH    [place] Feeding plans :    Contraception planned:  PPTL  Needs consent form   The following labs were ordered today:       GCT and Vitamin D  Unable to add on CBC and anti trep only GCT ordered    Water birth consent form was not given.  Blood type:   ABO   Date Value Ref Range Status   2019 O  Final     RH(D)   Date Value Ref Range Status   2019 Pos  Final     Antibody Screen   Date Value Ref Range Status   2019 Neg  Final   , Rhogam  was not given.  TDAP  was notgiven.  NEEDs at next visit   A/P:  Encounter Diagnoses   Name Primary?     High-risk pregnancy, unspecified trimester Yes     Pregnancy affected by fetal growth restriction      Abnormal glucose tolerance test during pregnancy, antepartum      Supervision of high risk pregnancy in third trimester      Orders Placed This Encounter   Procedures     25- OH-Vitamin D     Glucose 3 Hour     CBC with platelets     Treponema Abs w Reflex to RPR and Titer     Orders Placed This Encounter   Medications     Prenatal Vit-Fe Fumarate-FA (PRENATAL PLUS) 27-1 MG TABS     Sig: Take 1 tablet by mouth as needed       PLAN FOR VISIT    NEEDS TOLAC CONSENT REVIEWED< PPTL consent    NEEDS TDAP   WANTS BREAST PUMP RX next visit   Will go to lab 8 am on  for fasting 3 hr GTT      Continue scheduled prenatal care  Phone call duration:  25 minutes    JENIFFER Estevez CNM

## 2021-01-15 ENCOUNTER — HEALTH MAINTENANCE LETTER (OUTPATIENT)
Age: 26
End: 2021-01-15

## 2021-01-20 ENCOUNTER — TELEPHONE (OUTPATIENT)
Dept: OBGYN | Facility: CLINIC | Age: 26
End: 2021-01-20

## 2021-01-20 DIAGNOSIS — O36.5990 PREGNANCY AFFECTED BY FETAL GROWTH RESTRICTION: ICD-10-CM

## 2021-01-20 DIAGNOSIS — O09.90 HIGH-RISK PREGNANCY, UNSPECIFIED TRIMESTER: Primary | ICD-10-CM

## 2021-01-20 LAB
ERYTHROCYTE [DISTWIDTH] IN BLOOD BY AUTOMATED COUNT: 12.8 % (ref 10–15)
GLUCOSE 1H P 100 G GLC PO SERPL-MCNC: NORMAL MG/DL (ref 60–179)
GLUCOSE P FAST SERPL-MCNC: 89 MG/DL (ref 60–94)
HCT VFR BLD AUTO: 36 % (ref 35–47)
HGB BLD-MCNC: 12.6 G/DL (ref 11.7–15.7)
MCH RBC QN AUTO: 32.6 PG (ref 26.5–33)
MCHC RBC AUTO-ENTMCNC: 35 G/DL (ref 31.5–36.5)
MCV RBC AUTO: 93 FL (ref 78–100)
PLATELET # BLD AUTO: 273 10E9/L (ref 150–450)
RBC # BLD AUTO: 3.87 10E12/L (ref 3.8–5.2)
T PALLIDUM AB SER QL: NONREACTIVE
WBC # BLD AUTO: 12.3 10E9/L (ref 4–11)

## 2021-01-20 PROCEDURE — 36415 COLL VENOUS BLD VENIPUNCTURE: CPT | Performed by: MIDWIFE

## 2021-01-20 PROCEDURE — 85027 COMPLETE CBC AUTOMATED: CPT | Performed by: MIDWIFE

## 2021-01-20 PROCEDURE — 86780 TREPONEMA PALLIDUM: CPT | Performed by: MIDWIFE

## 2021-01-27 ENCOUNTER — OFFICE VISIT (OUTPATIENT)
Dept: MATERNAL FETAL MEDICINE | Facility: CLINIC | Age: 26
End: 2021-01-27
Attending: OBSTETRICS & GYNECOLOGY
Payer: COMMERCIAL

## 2021-01-27 ENCOUNTER — HOSPITAL ENCOUNTER (OUTPATIENT)
Dept: ULTRASOUND IMAGING | Facility: CLINIC | Age: 26
End: 2021-01-27
Attending: OBSTETRICS & GYNECOLOGY
Payer: COMMERCIAL

## 2021-01-27 ENCOUNTER — TELEPHONE (OUTPATIENT)
Dept: OBGYN | Facility: CLINIC | Age: 26
End: 2021-01-27

## 2021-01-27 DIAGNOSIS — O36.5990 PREGNANCY AFFECTED BY FETAL GROWTH RESTRICTION: ICD-10-CM

## 2021-01-27 PROCEDURE — 76816 OB US FOLLOW-UP PER FETUS: CPT

## 2021-01-27 PROCEDURE — 76818 FETAL BIOPHYS PROFILE W/NST: CPT

## 2021-01-27 PROCEDURE — 76818 FETAL BIOPHYS PROFILE W/NST: CPT | Mod: 26 | Performed by: OBSTETRICS & GYNECOLOGY

## 2021-01-27 PROCEDURE — 76815 OB US LIMITED FETUS(S): CPT | Mod: 26 | Performed by: OBSTETRICS & GYNECOLOGY

## 2021-01-27 NOTE — NURSING NOTE
NST Performed due to FGR.  Dr. Aiken reviewed efm tracing. See NST/BPP Doc Flowsheet tab.  Devorah Angel RN

## 2021-01-27 NOTE — PROGRESS NOTES
Please see ultrasound report under Imaging tab for details of today's ultrasound.    Enid Aiken MD  Maternal-Fetal Medicine

## 2021-01-27 NOTE — TELEPHONE ENCOUNTER
Patient in clinic after M appointment, states she needs to sign tubal and TOLAC consent. Spoke with Jessica Fleming CNM. States patient is not reliable to get to appointments and would like her to sign consents today if possible. Requests that we reach out to CHERYL on-call to see if she has availability to see patient.     Felisa Wagoner CNM paged.

## 2021-01-27 NOTE — TELEPHONE ENCOUNTER
Patient was unable to stay in clinic, left and returned. Consent for sterilization signed and stat scanned into chart.     Appointment with JUAREZ Saha CNM made 2/3 at 1020 which is immediately after [facility] appointment. Patient confirmed availability. Encouraged her to call with any questions or concerns.

## 2021-02-02 ENCOUNTER — HOSPITAL ENCOUNTER (OUTPATIENT)
Facility: CLINIC | Age: 26
Discharge: HOME | End: 2021-02-02
Attending: ADVANCED PRACTICE MIDWIFE | Admitting: ADVANCED PRACTICE MIDWIFE
Payer: COMMERCIAL

## 2021-02-02 VITALS
BODY MASS INDEX: 28.09 KG/M2 | WEIGHT: 179 LBS | HEIGHT: 67 IN | TEMPERATURE: 98.6 F | RESPIRATION RATE: 18 BRPM | SYSTOLIC BLOOD PRESSURE: 117 MMHG | DIASTOLIC BLOOD PRESSURE: 63 MMHG

## 2021-02-02 PROBLEM — Z36.89 ENCOUNTER FOR TRIAGE IN PREGNANT PATIENT: Status: ACTIVE | Noted: 2021-02-02

## 2021-02-02 LAB
ALBUMIN UR-MCNC: NEGATIVE MG/DL
APPEARANCE UR: CLEAR
BILIRUB UR QL STRIP: NEGATIVE
COLOR UR AUTO: YELLOW
FIBRONECTIN FETAL VAG QL: NEGATIVE
GLUCOSE UR STRIP-MCNC: NEGATIVE MG/DL
HGB UR QL STRIP: NEGATIVE
KETONES UR STRIP-MCNC: NEGATIVE MG/DL
LEUKOCYTE ESTERASE UR QL STRIP: NEGATIVE
NITRATE UR QL: NEGATIVE
PH UR STRIP: 7 PH (ref 5–7)
RBC #/AREA URNS AUTO: <1 /HPF (ref 0–2)
SOURCE: NORMAL
SP GR UR STRIP: 1.01 (ref 1–1.03)
SQUAMOUS #/AREA URNS AUTO: 1 /HPF (ref 0–1)
UROBILINOGEN UR STRIP-MCNC: NORMAL MG/DL (ref 0–2)
WBC #/AREA URNS AUTO: <1 /HPF (ref 0–5)

## 2021-02-02 PROCEDURE — 87491 CHLMYD TRACH DNA AMP PROBE: CPT | Performed by: ADVANCED PRACTICE MIDWIFE

## 2021-02-02 PROCEDURE — 82731 ASSAY OF FETAL FIBRONECTIN: CPT | Performed by: ADVANCED PRACTICE MIDWIFE

## 2021-02-02 PROCEDURE — 87653 STREP B DNA AMP PROBE: CPT | Performed by: ADVANCED PRACTICE MIDWIFE

## 2021-02-02 PROCEDURE — 99214 OFFICE O/P EST MOD 30 MIN: CPT | Mod: 25 | Performed by: ADVANCED PRACTICE MIDWIFE

## 2021-02-02 PROCEDURE — G0463 HOSPITAL OUTPT CLINIC VISIT: HCPCS

## 2021-02-02 PROCEDURE — 81001 URINALYSIS AUTO W/SCOPE: CPT | Performed by: ADVANCED PRACTICE MIDWIFE

## 2021-02-02 PROCEDURE — 87591 N.GONORRHOEAE DNA AMP PROB: CPT | Performed by: ADVANCED PRACTICE MIDWIFE

## 2021-02-02 PROCEDURE — 59025 FETAL NON-STRESS TEST: CPT | Mod: 26 | Performed by: ADVANCED PRACTICE MIDWIFE

## 2021-02-02 PROCEDURE — 87210 SMEAR WET MOUNT SALINE/INK: CPT | Performed by: ADVANCED PRACTICE MIDWIFE

## 2021-02-02 ASSESSMENT — MIFFLIN-ST. JEOR: SCORE: 1584.57

## 2021-02-02 NOTE — DISCHARGE INSTRUCTIONS
Discharge Instruction for Undelivered Patients      You were seen for: Pelvic pain and decreased fetal movement  We Consulted: CHERYL Fleming  You had (Test or Medicine):  Diet:   Drink 8 to 12 glasses of liquids (milk, juice, water) every day.  You may eat meals and snacks.     Activity:  Count fetal kicks everyday (see handout)  Call your doctor or nurse midwife if your baby is moving less than usual.     Call your provider if you notice:  Swelling in your face or increased swelling in your hands or legs.  Headaches that are not relieved by Tylenol (acetaminophen).  Changes in your vision (blurring: seeing spots or stars.)  Nausea (sick to your stomach) and vomiting (throwing up).   Weight gain of 5 pounds or more per week.  Heartburn that doesn't go away.  Signs of bladder infection: pain when you urinate (use the toilet), need to go more often and more urgently.  The bag of mott (rupture of membranes) breaks, or you notice leaking in your underwear.  Bright red blood in your underwear.  Abdominal (lower belly) or stomach pain.  For first baby: Contractions (tightening) less than 5 minutes apart for one hour or more.  Second (plus) baby: Contractions (tightening) less than 10 minutes apart and getting stronger.  *If less than 34 weeks: Contractions (tightenings) more than 6 times in one hour.  Increase or change in vaginal discharge (note the color and amount)  Other:     Follow-up:  As scheduled in the clinic

## 2021-02-02 NOTE — PROGRESS NOTES
"HOSPITAL TRIAGE NOTE  ===================    CHIEF COMPLAINT  ========================  Rajan Birgit Graham is a 25 year old patient presenting today at 33w0d for evaluation of decreased fetal movement and pelvic pain.    No LMP recorded. Patient is pregnant.  Estimated Date of Delivery: Mar 23, 2021     HPI  ==================   Pt reports intermittent pelvic pain, contractions since last night. Have stayed about the same; no significant change. States that she \"wants to have the baby\" as she has uncomfortable pressure in pelvis that can radiate down leg and makes her cry.   Feels that fetal movement is overall decreased.    Denies vaginal bleeding, discharge or leakage of fluid.   No HA, vision changes, ruq/epigastric pain.     Pregnancy significant for FGR- last us on 21 with EFW and AC <1%tile. BPP done after non-reactive NST (8/10).     Pt was seen at [facility] clinic. Have been attempting to complete ELLIOTT appointment at [facility].     CONTRACTIONS: every 3-10 minutes  ABDOMINAL PAIN: dull and cramping  FETAL MOVEMENT: active    VAGINAL BLEEDING: none  RUPTURE OF MEMBRANES: no  PELVIC PAIN: intermittent pressure and sharp      # Pain Assessment:  Current Pain Score 2021   Patient currently in pain? yes   Pain score (0-10) -   Pain location -   Pain descriptors -       REVIEW OF SYSTEMS  =====================  C: NEGATIVE for fever, chills  I: NEGATIVE for worrisome rashes, moles or lesions  E: NEGATIVE for vision changes or irritation  R: NEGATIVE for significant cough or SOB  CV: NEGATIVE for chest pain, palpitations or varicosities  GI: NEGATIVE for nausea, abdominal pain, heartburn, or change in bowel habits  : NEGATIVE for frequency, dysuria, or hematuria  M: NEGATIVE for significant arthralgias or myalgia  N: NEGATIVE for headache, weakness, dizziness or paresthesias  P: NEGATIVE for changes in mood or affect    PROBLEM LIST  ===============  Patient Active Problem List    Diagnosis Date Noted "     Encounter for triage in pregnant patient 2021     Priority: Medium     Labor and delivery indication for care or intervention 2021     Priority: Medium     Abnormal glucose tolerance test during pregnancy, antepartum 2021     Priority: Medium     21- Failed 1 hr GCT  3 hr GTT ordered   21- Vomited during 3 hr GTT  FBS Normal        Marijuana use 2021     Priority: Medium     20- UT- pos THC, repeat in labor. Pt aware       Pregnancy affected by fetal growth restriction 2020     Priority: Medium      US at 298+1 weeks: Fetal Growth Restriction> MFM recommendation: Given FGR, recommend initiation to NST to closely monitor fetal status. Rajan states she is not able to stay today for NST, but will return next week to initiate  surveillance with weekly NST, amniotic fluid and UA Doppler assessment. If abnormal UA Doppler is noted at the next US, we reviewed that if there is increased resistance  to flow, then  surveillance is recommended to increase to twice weekly. A repeat assessment of fetal growth will also be scheduled here in 3 weeks.  20- MFM US- EFW 9%, AC 8%, abn dopplers. Recommend twice wkly BPP, pt agreeable to once a week due to childcare  20- seen on BP with decreased FM, decels x3 then cat one x 7 hours. Betamethasone given x1 repeat in 24 hours_sibw ___, transfer care from [facility] to [facility]__  1.13  Per MFM BPP  _Continue  surveillance with weekly NST, amniotic fluid and UA Doppler assessment. If there is persistent abnormal UA Doppler then readdress recommendation to increase to twice weekly  surveillance.  21 BPP  Discussed our previous recommendation for twice weekly surveillance in the setting of EFW/AC at the 1st percentile. Rajan has transportation and childcare challenges and can currently be seen every Wednesday.          Not immune to hepatitis B virus 2020     Priority: Medium      2020 - Offer booster       Vitamin D deficiency 2020     Priority: Medium     2021: [employer] message sent to start supplement.  2020 - offer supplement RX       History of 2  sections 2019     Priority: Medium     Vaginal delivery 2011 of 7 lbs 4 oz    then C/S for compound presentation (hand in front of head) in .   Planned R C/section 2020 - Desires R C/section and BTL       Asthma with allergic rhinitis      Priority: Medium     Albuterol inhaler prn--worse in the summer       Supervision of high-risk pregnancy 2010     Priority: Medium     2020 - [facility] CNM, ELLIOTT to [facility] CNM 21____  Partner's name: Joss Alfaro  [X ] Entered on [facility] active OB patient list  [X ] NOB folder  [X ] First tri screen   declined  X ] QS/AFP declined  [X ] Started ASA    [X] Fetal anatomy US ordered  [ X] Rubella immune  [ X] Varicella Immune  [X ] Hep B NON Immune - see problem  [ X] Pap up to date/due  per [facility]    [ ] EOB folder  [ ] FLU shot  [ ] GCT,______  [x ] Rhogam if needed- NA  [ ] TOLAC consent done - prefers R C/section__NEEDS CONSENT __  [N/A ] Waterbirth   [X ] Breast pump - declines, has two  [ x] Car seat  [ ] TDAP given  [ X] PP Contraception plan: If tubal,consent date:NEEDS CONSENT  [ ] Labor plans: desires TOLAC unmedicated   [ ] :  [ x] Infant feeding plan:  Breastfeeding    [ ] Infant pediatrician:  X  UT done with  IUGR eval- POS THC.___ repeat in labor, pt aware  [ ] GBS pos; neg  [ ] OTC PP meds sent         HISTORIES  ==============  ALLERGIES:      Allergies   Allergen Reactions     Latex Hives     PAST MEDICAL HISTORY  Past Medical History:   Diagnosis Date     Asthma with allergic rhinitis     Albuterol inhaler prn--worse in the summer     Wounds and injuries     Broken arm     SOCIAL HISTORY  Social History     Socioeconomic History     Marital status: Single     Spouse name: Not on file     Number of children: Not on file      Years of education: Not on file     Highest education level: Not on file   Occupational History     Not on file   Social Needs     Financial resource strain: Not on file     Food insecurity     Worry: Not on file     Inability: Not on file     Transportation needs     Medical: Not on file     Non-medical: Not on file   Tobacco Use     Smoking status: Current Every Day Smoker     Packs/day: 0.25     Types: Cigarettes     Smokeless tobacco: Never Used   Substance and Sexual Activity     Alcohol use: No     Drug use: No     Sexual activity: Yes     Partners: Male     Birth control/protection: None     Comment: pregnant   Lifestyle     Physical activity     Days per week: Not on file     Minutes per session: Not on file     Stress: Not on file   Relationships     Social connections     Talks on phone: Not on file     Gets together: Not on file     Attends Congregation service: Not on file     Active member of club or organization: Not on file     Attends meetings of clubs or organizations: Not on file     Relationship status: Not on file     Intimate partner violence     Fear of current or ex partner: Not on file     Emotionally abused: Not on file     Physically abused: Not on file     Forced sexual activity: Not on file   Other Topics Concern     Parent/sibling w/ CABG, MI or angioplasty before 65F 55M? Not Asked   Social History Narrative     Not on file       FAMILY HISTORY  Family History   Problem Relation Age of Onset     Asthma Son      Depression Sister      Anxiety Disorder Sister      Anxiety Disorder Maternal Aunt      OB HISTORY  OB History    Para Term  AB Living   4 3 3 0 0 3   SAB TAB Ectopic Multiple Live Births   0 0 0 0 3      # Outcome Date GA Lbr Roque/2nd Weight Sex Delivery Anes PTL Lv   4 Current            3 Term 19 39w0d  2.45 kg (5 lb 6.4 oz) M CS-LTranv Spinal N RONEN      Name: MARY SHANE-KAROLTRACY      Apgar1: 9  Apgar5: 9   2 Term 03/26/15 38w4d  2.155 kg (4 lb 12 oz) F  "CS-LTranv None N RONEN      Complications: Compound presentation of fetus      Apgar1: 9  Apgar5: 9   1 Term 05/31/11 40w3d 01:30 / 00:08 3.289 kg (7 lb 4 oz) M Vag-Spont Local N RONEN      Name: REMY SHANE BABY      Apgar1: 9  Apgar5: 9     Prenatal Labs:   Lab Results   Component Value Date    ABO O 06/05/2019    RH Pos 06/05/2019    AS Neg 06/05/2019    RUQIGG 110 11/28/2018    HEPBANG Nonreactive 11/28/2018    TREPAB Negative 08/21/2014    HGB 12.6 01/20/2021    HIAGAB Nonreactive 11/28/2018    GLU1 135 (H) 01/13/2021       EXAM  ============  Temp 98.6  F (37  C) (Oral)   Resp 18   Ht 1.702 m (5' 7\")   Wt 81.2 kg (179 lb)   Breastfeeding No   BMI 28.04 kg/m    GENERAL APPEARANCE: healthy, alert and no distress  RESP: lungs clear to auscultation - no rales, rhonchi or wheezes  CV: regular rates and rhythm, normal S1 S2, no S3 or S4 and no murmur,and no varicosities  ABDOMEN:  soft, nontender, no epigastric pain  SKIN: no suspicious lesions or rashes  NEURO: Denies headache, blurred vision, other vision changes  PSYCH: mentation appears normal. and affect normal/bright  MS/ LEGS: Reflexes normal bilaterally    CONTRACTIONS: every 3-5 minutes   FETAL HEART TONES: continuous EFM- baseline 140 with minimal variability, no decelerations. With continued monitoring, some accels and moderate variability. No decelerations.   NST: NON-REACTIVE    Sterile speculum exam:  Cervix visually appears closed. Swabs collected.  SSE: external os 1. Internal os closed/long/high    PRESENTATION: VERTEX  BLOOD: no  DISCHARGE: none    ROM: no  ROMPLUS: not done    LABS: FFN neg, wet prep neg, UA neg    UC pending, GC/CT pending, GBS pending    DIAGNOSIS  ============  33w0d seen on the [facility] T[facility]  Non-reactive NST      Patient Active Problem List   Diagnosis     Asthma with allergic rhinitis     Pregnancy affected by fetal growth restriction     Not immune to hepatitis B virus     Vitamin D deficiency     Supervision of " high-risk pregnancy     History of 2  sections     Marijuana use     Abnormal glucose tolerance test during pregnancy, antepartum     Encounter for triage in pregnant patient     Labor and delivery indication for care or intervention       PLAN  ============  Not in  labor; FFN neg. Cervix closed/long/high. Declined recheck.  Consulted with [facility], Dr. Rose, who reviewed fetal tracing.   BPP performed by [facility]- .   Ok to discharge per [facility].   Follow up at [facility] appointment and ELLIOTT appt at [facility] tomorrow.    JENIFFER Mitchell, CNM

## 2021-02-02 NOTE — PROGRESS NOTES
Brief MFM Progress Note    Asked by Bernadette LUNA to evaluate patient with non-reactive NST. Bedside BPP performed.    Start time 1645:    2: Fetal breathing movements  2: Gross body movements  2: Fetal tone  2: Amniotic fluid volume, MVP  3.2  NST: non-reactive  8/10 Biophysical profile score    UA doppler PI 1.21, appropriate for GA    Patient to follow up with MFM 2/3 for repeat dopplers.     Ultrasound performed with supervision of Dr. Armaan Griffin MD, MPH  OBGYN PGY-3  2/2/2021 5:35 PM

## 2021-02-02 NOTE — PROGRESS NOTES
Patient was seen for pelvic pain and decreased fetal movement. VSS, afebrile, complainS of lower abdominal pain. See flow sheet for fetal and uterine monitoring. Patient feels much comfortable. BPP done, FHR appropriate for gestational age. Patient is discharged home and will follow up with provider.

## 2021-02-02 NOTE — LETTER
February 2, 2021        Rajan Graham  [address] [place] [id number]    To Whom it May Concern:    Rajan Graham was evaluated at [facility] on 2/2/2021.  Pt's partner was present and needed during the entirety of this hospital stay. Please excuse from work.     Please call [phone] if you have any questions or concerns.      Sincerely,      JENIFFER Mitchell, MAGGIEM

## 2021-02-03 ENCOUNTER — OFFICE VISIT (OUTPATIENT)
Dept: MATERNAL FETAL MEDICINE | Facility: CLINIC | Age: 26
End: 2021-02-03
Attending: OBSTETRICS & GYNECOLOGY
Payer: COMMERCIAL

## 2021-02-03 ENCOUNTER — HOSPITAL ENCOUNTER (OUTPATIENT)
Dept: ULTRASOUND IMAGING | Facility: CLINIC | Age: 26
End: 2021-02-03
Attending: OBSTETRICS & GYNECOLOGY
Payer: COMMERCIAL

## 2021-02-03 ENCOUNTER — OFFICE VISIT (OUTPATIENT)
Dept: OBGYN | Facility: CLINIC | Age: 26
End: 2021-02-03
Attending: MIDWIFE
Payer: COMMERCIAL

## 2021-02-03 VITALS
DIASTOLIC BLOOD PRESSURE: 74 MMHG | BODY MASS INDEX: 28.31 KG/M2 | HEIGHT: 67 IN | SYSTOLIC BLOOD PRESSURE: 112 MMHG | HEART RATE: 76 BPM | WEIGHT: 180.4 LBS

## 2021-02-03 DIAGNOSIS — O09.93 SUPERVISION OF HIGH RISK PREGNANCY IN THIRD TRIMESTER: Primary | ICD-10-CM

## 2021-02-03 DIAGNOSIS — Z98.891 HISTORY OF 2 CESAREAN SECTIONS: ICD-10-CM

## 2021-02-03 DIAGNOSIS — E55.9 VITAMIN D DEFICIENCY: ICD-10-CM

## 2021-02-03 DIAGNOSIS — Z78.9 NOT IMMUNE TO HEPATITIS B VIRUS: ICD-10-CM

## 2021-02-03 DIAGNOSIS — O99.810 ABNORMAL GLUCOSE TOLERANCE TEST DURING PREGNANCY, ANTEPARTUM: ICD-10-CM

## 2021-02-03 DIAGNOSIS — F12.90 MARIJUANA USE: ICD-10-CM

## 2021-02-03 DIAGNOSIS — O36.5990 PREGNANCY AFFECTED BY FETAL GROWTH RESTRICTION: ICD-10-CM

## 2021-02-03 PROBLEM — R87.610 ASCUS OF CERVIX WITH NEGATIVE HIGH RISK HPV: Status: ACTIVE | Noted: 2018-07-01

## 2021-02-03 LAB
C TRACH DNA SPEC QL NAA+PROBE: NEGATIVE
GP B STREP DNA SPEC QL NAA+PROBE: NEGATIVE
N GONORRHOEA DNA SPEC QL NAA+PROBE: NEGATIVE
SPECIMEN SOURCE: NORMAL
WET PREP SPEC: NORMAL

## 2021-02-03 PROCEDURE — G0463 HOSPITAL OUTPT CLINIC VISIT: HCPCS

## 2021-02-03 PROCEDURE — 99213 OFFICE O/P EST LOW 20 MIN: CPT | Performed by: MIDWIFE

## 2021-02-03 PROCEDURE — 59025 FETAL NON-STRESS TEST: CPT | Performed by: OBSTETRICS & GYNECOLOGY

## 2021-02-03 PROCEDURE — 59025 FETAL NON-STRESS TEST: CPT | Mod: 26 | Performed by: OBSTETRICS & GYNECOLOGY

## 2021-02-03 PROCEDURE — 76820 UMBILICAL ARTERY ECHO: CPT

## 2021-02-03 PROCEDURE — 76820 UMBILICAL ARTERY ECHO: CPT | Mod: 26 | Performed by: OBSTETRICS & GYNECOLOGY

## 2021-02-03 PROCEDURE — 76815 OB US LIMITED FETUS(S): CPT | Mod: 26 | Performed by: OBSTETRICS & GYNECOLOGY

## 2021-02-03 PROCEDURE — 59025 FETAL NON-STRESS TEST: CPT

## 2021-02-03 RX ORDER — BLOOD-GLUCOSE METER
EACH MISCELLANEOUS
Qty: 1 KIT | Refills: 0 | Status: SHIPPED | OUTPATIENT
Start: 2021-02-03

## 2021-02-03 RX ORDER — BREAST PUMP
1 EACH MISCELLANEOUS DAILY PRN
Qty: 1 EACH | Refills: 0 | Status: SHIPPED | OUTPATIENT
Start: 2021-02-03

## 2021-02-03 ASSESSMENT — MIFFLIN-ST. JEOR: SCORE: 1590.92
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
## 2021-02-04 ENCOUNTER — TELEPHONE (OUTPATIENT)
Dept: OBGYN | Facility: CLINIC | Age: 26
End: 2021-02-04

## 2021-02-10 ENCOUNTER — HOSPITAL ENCOUNTER (OUTPATIENT)
Dept: ULTRASOUND IMAGING | Facility: CLINIC | Age: 26
End: 2021-02-10
Attending: OBSTETRICS & GYNECOLOGY
Payer: COMMERCIAL

## 2021-02-10 ENCOUNTER — OFFICE VISIT (OUTPATIENT)
Dept: MATERNAL FETAL MEDICINE | Facility: CLINIC | Age: 26
End: 2021-02-10
Attending: OBSTETRICS & GYNECOLOGY
Payer: COMMERCIAL

## 2021-02-10 DIAGNOSIS — O36.5990 PREGNANCY AFFECTED BY FETAL GROWTH RESTRICTION: ICD-10-CM

## 2021-02-10 PROCEDURE — 76820 UMBILICAL ARTERY ECHO: CPT | Mod: 26 | Performed by: OBSTETRICS & GYNECOLOGY

## 2021-02-10 PROCEDURE — 76818 FETAL BIOPHYS PROFILE W/NST: CPT

## 2021-02-10 PROCEDURE — 76818 FETAL BIOPHYS PROFILE W/NST: CPT | Mod: 26 | Performed by: OBSTETRICS & GYNECOLOGY

## 2021-02-10 PROCEDURE — 76815 OB US LIMITED FETUS(S): CPT | Mod: 26 | Performed by: OBSTETRICS & GYNECOLOGY

## 2021-02-10 NOTE — PROGRESS NOTES
"Please see \"Imaging\" tab under Chart Review for full details.    Hue Parker MD  Maternal Fetal Medicine    "

## 2021-02-10 NOTE — NURSING NOTE
NST Performed due to FGR.   reviewed efm tracing. See NST/BPP Doc Flowsheet tab.    Pt and partner asking writer about safety of going roller skating.  Writer advised pt to avoid roller skating due to changes in center of gravity and potential for abdominal/fetal trauma in the event of a fall.  Pt and partner verbalized understanding.

## 2021-02-17 ENCOUNTER — HOSPITAL ENCOUNTER (OUTPATIENT)
Dept: ULTRASOUND IMAGING | Facility: CLINIC | Age: 26
End: 2021-02-17
Attending: OBSTETRICS & GYNECOLOGY
Payer: COMMERCIAL

## 2021-02-17 ENCOUNTER — OFFICE VISIT (OUTPATIENT)
Dept: OBGYN | Facility: CLINIC | Age: 26
End: 2021-02-17
Attending: ADVANCED PRACTICE MIDWIFE
Payer: COMMERCIAL

## 2021-02-17 ENCOUNTER — OFFICE VISIT (OUTPATIENT)
Dept: MATERNAL FETAL MEDICINE | Facility: CLINIC | Age: 26
End: 2021-02-17
Attending: OBSTETRICS & GYNECOLOGY
Payer: COMMERCIAL

## 2021-02-17 VITALS
SYSTOLIC BLOOD PRESSURE: 127 MMHG | DIASTOLIC BLOOD PRESSURE: 77 MMHG | WEIGHT: 178.8 LBS | HEART RATE: 90 BPM | BODY MASS INDEX: 28.06 KG/M2 | HEIGHT: 67 IN

## 2021-02-17 DIAGNOSIS — O36.5990 PREGNANCY AFFECTED BY FETAL GROWTH RESTRICTION: ICD-10-CM

## 2021-02-17 DIAGNOSIS — O09.93 SUPERVISION OF HIGH RISK PREGNANCY IN THIRD TRIMESTER: Primary | ICD-10-CM

## 2021-02-17 DIAGNOSIS — O92.70 LACTATION DISORDER: ICD-10-CM

## 2021-02-17 PROCEDURE — 59025 FETAL NON-STRESS TEST: CPT | Performed by: OBSTETRICS & GYNECOLOGY

## 2021-02-17 PROCEDURE — 87653 STREP B DNA AMP PROBE: CPT | Performed by: ADVANCED PRACTICE MIDWIFE

## 2021-02-17 PROCEDURE — G0463 HOSPITAL OUTPT CLINIC VISIT: HCPCS | Mod: 25

## 2021-02-17 PROCEDURE — 76816 OB US FOLLOW-UP PER FETUS: CPT | Mod: 26 | Performed by: OBSTETRICS & GYNECOLOGY

## 2021-02-17 PROCEDURE — 76816 OB US FOLLOW-UP PER FETUS: CPT

## 2021-02-17 PROCEDURE — 59025 FETAL NON-STRESS TEST: CPT | Mod: 26 | Performed by: OBSTETRICS & GYNECOLOGY

## 2021-02-17 PROCEDURE — 59025 FETAL NON-STRESS TEST: CPT | Mod: 59

## 2021-02-17 PROCEDURE — 99213 OFFICE O/P EST LOW 20 MIN: CPT | Performed by: ADVANCED PRACTICE MIDWIFE

## 2021-02-17 PROCEDURE — 76820 UMBILICAL ARTERY ECHO: CPT | Mod: 26 | Performed by: OBSTETRICS & GYNECOLOGY

## 2021-02-17 RX ORDER — BREAST PUMP
1 EACH MISCELLANEOUS DAILY
Qty: 1 EACH | Refills: 0 | Status: SHIPPED | OUTPATIENT
Start: 2021-02-17

## 2021-02-17 ASSESSMENT — MIFFLIN-ST. JEOR: SCORE: 1583.66

## 2021-02-17 ASSESSMENT — PAIN SCALES - GENERAL: PAINLEVEL: NO PAIN (0)

## 2021-02-17 NOTE — LETTER
"2021       RE: Rajan Graham   [address]  [facility] [address]     Dear Colleague,    Thank you for referring your patient, Rajan Graham, to the [facility] [place] at [facility]. Please see a copy of my visit note below.    Subjective:     25 year old  at 35w1d presents for a routine prenatal appointment.  Denies vaginal bleeding,  leakage of fluid, or change in vaginal discharge.  Denies contractions.  + fetal movement.       No HA, visual changes, RUQ or epigastric pain.   Patient concerns:   - Feeling more uncomfortable (pelvic pain/pressure).  Also notes occasional vomiting, triggered by feeling of something stuck in throat (takes zofran occasional, feels improvement when taking).   - Has MFM ultrasound scheduled for later today for FGR.     - Has been checking blood sugars at home, did not bring log today. Reports this AM fasting was 92, cannot recall any other values.    - GBS collected today    - Declined Tdap today, consider next visit    Planning IV medication for labor, partner as support person, breastfeeding (breast pump rx given)    Objective:  Vitals:    21 1151   BP: 127/77   Pulse: 90   Weight: 81.1 kg (178 lb 12.8 oz)   Height: 1.702 m (5' 7\")    See OB flowsheet    Assessment/Plan:  Encounter Diagnoses   Name Primary?     Supervision of high risk pregnancy in third trimester Yes     Lactation disorder      Orders Placed This Encounter   Medications     Misc. Devices (BREAST PUMP) MISC     Si each daily     Dispense:  1 each     Refill:  0     PHQ-9 SCORE 3/11/2015 2018 5/15/2019   PHQ-9 Total Score 1 - -   PHQ-9 Total Score - 7 7     GBS screening: Obtained by self-swab.  Birth preferences reviewed: Medicated (IV), TOLAC  Labor signs discussed. Reinforced daily fetal movement counts.  Reviewed why/how to contact provider if headache/visual changes/RUQ or epigastric pain, decreased " fetal movement, vaginal bleeding, leakage of fluid.  Briefly reviewed induction process with history of  (Pitocin or bulb/balloon). Continue conversation at future visits.  Encouraged to bring blood sugar log to next visit or via MyChart.   Reviewed relief measures for pregnancy discomforts.   Return to clinic in 1 week and prn if questions or concerns.     JENIFFER BautistaM

## 2021-02-17 NOTE — PROGRESS NOTES
"Subjective:     25 year old  at 35w1d presents for a routine prenatal appointment.  Denies vaginal bleeding,  leakage of fluid, or change in vaginal discharge.  Denies contractions.  + fetal movement.       No HA, visual changes, RUQ or epigastric pain.   Patient concerns:   - Feeling more uncomfortable (pelvic pain/pressure).  Also notes occasional vomiting, triggered by feeling of something stuck in throat (takes zofran occasional, feels improvement when taking).   - Has MFM ultrasound scheduled for later today for FGR.     - Has been checking blood sugars at home, did not bring log today. Reports this AM fasting was 92, cannot recall any other values.    - GBS collected today    - Declined Tdap today, consider next visit    Planning IV medication for labor, partner as support person, breastfeeding (breast pump rx given)    Objective:  Vitals:    21 1151   BP: 127/77   Pulse: 90   Weight: 81.1 kg (178 lb 12.8 oz)   Height: 1.702 m (5' 7\")    See OB flowsheet    Assessment/Plan:  Encounter Diagnoses   Name Primary?     Supervision of high risk pregnancy in third trimester Yes     Lactation disorder      Orders Placed This Encounter   Medications     Misc. Devices (BREAST PUMP) MISC     Si each daily     Dispense:  1 each     Refill:  0     PHQ-9 SCORE 3/11/2015 2018 5/15/2019   PHQ-9 Total Score 1 - -   PHQ-9 Total Score - 7 7     GBS screening: Obtained by self-swab.  Birth preferences reviewed: Medicated (IV), TOLAC  Labor signs discussed. Reinforced daily fetal movement counts.  Reviewed why/how to contact provider if headache/visual changes/RUQ or epigastric pain, decreased fetal movement, vaginal bleeding, leakage of fluid.  Briefly reviewed induction process with history of  (Pitocin or bulb/balloon). Continue conversation at future visits.  Encouraged to bring blood sugar log to next visit or via [employer].   Reviewed relief measures for pregnancy discomforts.   Return to clinic " in 1 week and prn if questions or concerns.     Rashel Reddy, APRN CNM

## 2021-02-18 LAB
GP B STREP DNA SPEC QL NAA+PROBE: NEGATIVE
SPECIMEN SOURCE: NORMAL

## 2021-02-22 ENCOUNTER — HOSPITAL ENCOUNTER (INPATIENT)
Facility: CLINIC | Age: 26
LOS: 3 days | Discharge: HOME | End: 2021-02-25
Attending: ADVANCED PRACTICE MIDWIFE | Admitting: ADVANCED PRACTICE MIDWIFE
Payer: COMMERCIAL

## 2021-02-22 DIAGNOSIS — O13.9 GESTATIONAL HYPERTENSION, ANTEPARTUM: ICD-10-CM

## 2021-02-22 DIAGNOSIS — Z98.891 S/P CESAREAN SECTION: Primary | ICD-10-CM

## 2021-02-22 LAB — RUPTURE OF FETAL MEMBRANES BY ROM PLUS: POSITIVE

## 2021-02-22 PROCEDURE — 84112 EVAL AMNIOTIC FLUID PROTEIN: CPT | Performed by: ADVANCED PRACTICE MIDWIFE

## 2021-02-22 PROCEDURE — 86900 BLOOD TYPING SEROLOGIC ABO: CPT | Performed by: ADVANCED PRACTICE MIDWIFE

## 2021-02-22 PROCEDURE — 87635 SARS-COV-2 COVID-19 AMP PRB: CPT | Performed by: ADVANCED PRACTICE MIDWIFE

## 2021-02-22 PROCEDURE — 86850 RBC ANTIBODY SCREEN: CPT | Performed by: ADVANCED PRACTICE MIDWIFE

## 2021-02-22 PROCEDURE — 86780 TREPONEMA PALLIDUM: CPT | Performed by: ADVANCED PRACTICE MIDWIFE

## 2021-02-22 PROCEDURE — 85025 COMPLETE CBC W/AUTO DIFF WBC: CPT | Performed by: ADVANCED PRACTICE MIDWIFE

## 2021-02-22 PROCEDURE — G0463 HOSPITAL OUTPT CLINIC VISIT: HCPCS

## 2021-02-22 PROCEDURE — 86901 BLOOD TYPING SEROLOGIC RH(D): CPT | Performed by: ADVANCED PRACTICE MIDWIFE

## 2021-02-22 PROCEDURE — 120N000002 HC R&B MED SURG/OB UMMC

## 2021-02-22 RX ORDER — OXYTOCIN/0.9 % SODIUM CHLORIDE 30/500 ML
100-340 PLASTIC BAG, INJECTION (ML) INTRAVENOUS CONTINUOUS PRN
Status: COMPLETED | OUTPATIENT
Start: 2021-02-22 | End: 2021-02-23

## 2021-02-22 RX ORDER — FENTANYL CITRATE 50 UG/ML
50-100 INJECTION, SOLUTION INTRAMUSCULAR; INTRAVENOUS
Status: DISCONTINUED | OUTPATIENT
Start: 2021-02-22 | End: 2021-02-24

## 2021-02-22 RX ORDER — NALOXONE HYDROCHLORIDE 0.4 MG/ML
0.4 INJECTION, SOLUTION INTRAMUSCULAR; INTRAVENOUS; SUBCUTANEOUS
Status: DISCONTINUED | OUTPATIENT
Start: 2021-02-22 | End: 2021-02-23

## 2021-02-22 RX ORDER — OXYTOCIN 10 [USP'U]/ML
10 INJECTION, SOLUTION INTRAMUSCULAR; INTRAVENOUS
Status: DISCONTINUED | OUTPATIENT
Start: 2021-02-22 | End: 2021-02-24

## 2021-02-22 RX ORDER — METHYLERGONOVINE MALEATE 0.2 MG/ML
200 INJECTION INTRAVENOUS
Status: DISCONTINUED | OUTPATIENT
Start: 2021-02-22 | End: 2021-02-24

## 2021-02-22 RX ORDER — CARBOPROST TROMETHAMINE 250 UG/ML
250 INJECTION, SOLUTION INTRAMUSCULAR
Status: DISCONTINUED | OUTPATIENT
Start: 2021-02-22 | End: 2021-02-24

## 2021-02-22 RX ORDER — ACETAMINOPHEN 325 MG/1
650 TABLET ORAL EVERY 4 HOURS PRN
Status: DISCONTINUED | OUTPATIENT
Start: 2021-02-22 | End: 2021-02-24

## 2021-02-22 RX ORDER — SODIUM CHLORIDE, SODIUM LACTATE, POTASSIUM CHLORIDE, CALCIUM CHLORIDE 600; 310; 30; 20 MG/100ML; MG/100ML; MG/100ML; MG/100ML
INJECTION, SOLUTION INTRAVENOUS CONTINUOUS
Status: DISCONTINUED | OUTPATIENT
Start: 2021-02-22 | End: 2021-02-24

## 2021-02-22 RX ORDER — NALOXONE HYDROCHLORIDE 0.4 MG/ML
0.2 INJECTION, SOLUTION INTRAMUSCULAR; INTRAVENOUS; SUBCUTANEOUS
Status: DISCONTINUED | OUTPATIENT
Start: 2021-02-22 | End: 2021-02-23

## 2021-02-22 RX ORDER — ONDANSETRON 2 MG/ML
4 INJECTION INTRAMUSCULAR; INTRAVENOUS EVERY 6 HOURS PRN
Status: DISCONTINUED | OUTPATIENT
Start: 2021-02-22 | End: 2021-02-24

## 2021-02-22 RX ORDER — LIDOCAINE 40 MG/G
CREAM TOPICAL
Status: DISCONTINUED | OUTPATIENT
Start: 2021-02-22 | End: 2021-02-23

## 2021-02-22 RX ORDER — IBUPROFEN 800 MG/1
800 TABLET, FILM COATED ORAL
Status: DISCONTINUED | OUTPATIENT
Start: 2021-02-22 | End: 2021-02-24

## 2021-02-22 RX ORDER — OXYCODONE AND ACETAMINOPHEN 5; 325 MG/1; MG/1
1 TABLET ORAL
Status: DISCONTINUED | OUTPATIENT
Start: 2021-02-22 | End: 2021-02-24

## 2021-02-22 ASSESSMENT — MIFFLIN-ST. JEOR: SCORE: 1580.03

## 2021-02-23 ENCOUNTER — ANESTHESIA EVENT (OUTPATIENT)
Dept: OBGYN | Facility: CLINIC | Age: 26
End: 2021-02-23
Payer: COMMERCIAL

## 2021-02-23 ENCOUNTER — ANCILLARY PROCEDURE (OUTPATIENT)
Dept: ULTRASOUND IMAGING | Facility: CLINIC | Age: 26
End: 2021-02-23
Payer: COMMERCIAL

## 2021-02-23 ENCOUNTER — ANESTHESIA EVENT (OUTPATIENT)
Dept: OBGYN | Facility: CLINIC | Age: 26
End: 2021-02-23

## 2021-02-23 ENCOUNTER — ANESTHESIA (OUTPATIENT)
Dept: OBGYN | Facility: CLINIC | Age: 26
End: 2021-02-23
Payer: COMMERCIAL

## 2021-02-23 ENCOUNTER — ANESTHESIA (OUTPATIENT)
Dept: OBGYN | Facility: CLINIC | Age: 26
End: 2021-02-23

## 2021-02-23 LAB
ABO + RH BLD: NORMAL
ABO + RH BLD: NORMAL
ALBUMIN UR-MCNC: NEGATIVE MG/DL
AMPHETAMINES UR QL SCN: NEGATIVE
APPEARANCE UR: CLEAR
BASOPHILS # BLD AUTO: 0 10E9/L (ref 0–0.2)
BASOPHILS NFR BLD AUTO: 0.3 %
BILIRUB UR QL STRIP: NEGATIVE
BLD GP AB SCN SERPL QL: NORMAL
BLOOD BANK CMNT PATIENT-IMP: NORMAL
CANNABINOIDS UR QL: NEGATIVE
COCAINE UR QL: NEGATIVE
COLOR UR AUTO: YELLOW
DIFFERENTIAL METHOD BLD: ABNORMAL
EOSINOPHIL # BLD AUTO: 0.1 10E9/L (ref 0–0.7)
EOSINOPHIL NFR BLD AUTO: 0.5 %
ERYTHROCYTE [DISTWIDTH] IN BLOOD BY AUTOMATED COUNT: 12.9 % (ref 10–15)
GLUCOSE UR STRIP-MCNC: NEGATIVE MG/DL
HCT VFR BLD AUTO: 33.9 % (ref 35–47)
HGB BLD-MCNC: 11.7 G/DL (ref 11.7–15.7)
HGB UR QL STRIP: NEGATIVE
IMM GRANULOCYTES # BLD: 0.1 10E9/L (ref 0–0.4)
IMM GRANULOCYTES NFR BLD: 0.5 %
KETONES UR STRIP-MCNC: NEGATIVE MG/DL
LABORATORY COMMENT REPORT: NORMAL
LEUKOCYTE ESTERASE UR QL STRIP: NEGATIVE
LYMPHOCYTES # BLD AUTO: 2 10E9/L (ref 0.8–5.3)
LYMPHOCYTES NFR BLD AUTO: 19.9 %
MCH RBC QN AUTO: 32.3 PG (ref 26.5–33)
MCHC RBC AUTO-ENTMCNC: 34.5 G/DL (ref 31.5–36.5)
MCV RBC AUTO: 94 FL (ref 78–100)
MONOCYTES # BLD AUTO: 1 10E9/L (ref 0–1.3)
MONOCYTES NFR BLD AUTO: 9.8 %
MUCOUS THREADS #/AREA URNS LPF: PRESENT /LPF
NEUTROPHILS # BLD AUTO: 7 10E9/L (ref 1.6–8.3)
NEUTROPHILS NFR BLD AUTO: 69 %
NITRATE UR QL: NEGATIVE
NRBC # BLD AUTO: 0 10*3/UL
NRBC BLD AUTO-RTO: 0 /100
OPIATES UR QL SCN: NEGATIVE
PCP UR QL SCN: NEGATIVE
PH UR STRIP: 6.5 PH (ref 5–7)
PLATELET # BLD AUTO: 214 10E9/L (ref 150–450)
RBC # BLD AUTO: 3.62 10E12/L (ref 3.8–5.2)
RBC #/AREA URNS AUTO: 0 /HPF (ref 0–2)
SARS-COV-2 RNA RESP QL NAA+PROBE: NEGATIVE
SOURCE: ABNORMAL
SP GR UR STRIP: 1.01 (ref 1–1.03)
SPECIMEN EXP DATE BLD: NORMAL
SPECIMEN SOURCE: NORMAL
SQUAMOUS #/AREA URNS AUTO: <1 /HPF (ref 0–1)
T PALLIDUM AB SER QL: NONREACTIVE
UROBILINOGEN UR STRIP-MCNC: NORMAL MG/DL (ref 0–2)
WBC # BLD AUTO: 10.1 10E9/L (ref 4–11)
WBC #/AREA URNS AUTO: <1 /HPF (ref 0–5)

## 2021-02-23 PROCEDURE — 258N000003 HC RX IP 258 OP 636: Performed by: STUDENT IN AN ORGANIZED HEALTH CARE EDUCATION/TRAINING PROGRAM

## 2021-02-23 PROCEDURE — 710N000010 HC RECOVERY PHASE 1, LEVEL 2, PER MIN: Performed by: OBSTETRICS & GYNECOLOGY

## 2021-02-23 PROCEDURE — 88307 TISSUE EXAM BY PATHOLOGIST: CPT | Mod: TC | Performed by: STUDENT IN AN ORGANIZED HEALTH CARE EDUCATION/TRAINING PROGRAM

## 2021-02-23 PROCEDURE — 88307 TISSUE EXAM BY PATHOLOGIST: CPT | Mod: 26 | Performed by: PATHOLOGY

## 2021-02-23 PROCEDURE — 81001 URINALYSIS AUTO W/SCOPE: CPT | Performed by: ADVANCED PRACTICE MIDWIFE

## 2021-02-23 PROCEDURE — 271N000001 HC OR GENERAL SUPPLY NON-STERILE: Performed by: OBSTETRICS & GYNECOLOGY

## 2021-02-23 PROCEDURE — 88304 TISSUE EXAM BY PATHOLOGIST: CPT | Mod: TC | Performed by: STUDENT IN AN ORGANIZED HEALTH CARE EDUCATION/TRAINING PROGRAM

## 2021-02-23 PROCEDURE — 370N000017 HC ANESTHESIA TECHNICAL FEE, PER MIN: Performed by: OBSTETRICS & GYNECOLOGY

## 2021-02-23 PROCEDURE — 250N000011 HC RX IP 250 OP 636: Performed by: STUDENT IN AN ORGANIZED HEALTH CARE EDUCATION/TRAINING PROGRAM

## 2021-02-23 PROCEDURE — 258N000003 HC RX IP 258 OP 636: Performed by: ADVANCED PRACTICE MIDWIFE

## 2021-02-23 PROCEDURE — 999N000141 HC STATISTIC PRE-PROCEDURE NURSING ASSESSMENT: Performed by: OBSTETRICS & GYNECOLOGY

## 2021-02-23 PROCEDURE — 120N000002 HC R&B MED SURG/OB UMMC

## 2021-02-23 PROCEDURE — 250N000009 HC RX 250: Performed by: ADVANCED PRACTICE MIDWIFE

## 2021-02-23 PROCEDURE — 272N000001 HC OR GENERAL SUPPLY STERILE: Performed by: OBSTETRICS & GYNECOLOGY

## 2021-02-23 PROCEDURE — 250N000011 HC RX IP 250 OP 636

## 2021-02-23 PROCEDURE — 250N000013 HC RX MED GY IP 250 OP 250 PS 637: Performed by: STUDENT IN AN ORGANIZED HEALTH CARE EDUCATION/TRAINING PROGRAM

## 2021-02-23 PROCEDURE — 250N000009 HC RX 250: Performed by: STUDENT IN AN ORGANIZED HEALTH CARE EDUCATION/TRAINING PROGRAM

## 2021-02-23 PROCEDURE — 250N000009 HC RX 250: Performed by: OBSTETRICS & GYNECOLOGY

## 2021-02-23 PROCEDURE — 88302 TISSUE EXAM BY PATHOLOGIST: CPT | Mod: 26 | Performed by: PATHOLOGY

## 2021-02-23 PROCEDURE — C9290 INJ, BUPIVACAINE LIPOSOME: HCPCS | Performed by: STUDENT IN AN ORGANIZED HEALTH CARE EDUCATION/TRAINING PROGRAM

## 2021-02-23 PROCEDURE — 360N000076 HC SURGERY LEVEL 3, PER MIN: Performed by: OBSTETRICS & GYNECOLOGY

## 2021-02-23 PROCEDURE — 80307 DRUG TEST PRSMV CHEM ANLYZR: CPT | Performed by: ADVANCED PRACTICE MIDWIFE

## 2021-02-23 RX ORDER — NALOXONE HYDROCHLORIDE 0.4 MG/ML
0.4 INJECTION, SOLUTION INTRAMUSCULAR; INTRAVENOUS; SUBCUTANEOUS
Status: DISCONTINUED | OUTPATIENT
Start: 2021-02-23 | End: 2021-02-25 | Stop reason: HOSPADM

## 2021-02-23 RX ORDER — NALOXONE HYDROCHLORIDE 0.4 MG/ML
0.2 INJECTION, SOLUTION INTRAMUSCULAR; INTRAVENOUS; SUBCUTANEOUS
Status: DISCONTINUED | OUTPATIENT
Start: 2021-02-23 | End: 2021-02-25 | Stop reason: HOSPADM

## 2021-02-23 RX ORDER — AZITHROMYCIN 500 MG/5ML
500 INJECTION, POWDER, LYOPHILIZED, FOR SOLUTION INTRAVENOUS
Status: COMPLETED | OUTPATIENT
Start: 2021-02-23 | End: 2021-02-23

## 2021-02-23 RX ORDER — FENTANYL CITRATE 50 UG/ML
25-50 INJECTION, SOLUTION INTRAMUSCULAR; INTRAVENOUS
Status: DISCONTINUED | OUTPATIENT
Start: 2021-02-23 | End: 2021-02-25 | Stop reason: HOSPADM

## 2021-02-23 RX ORDER — OXYTOCIN 10 [USP'U]/ML
INJECTION, SOLUTION INTRAMUSCULAR; INTRAVENOUS
Status: DISCONTINUED
Start: 2021-02-23 | End: 2021-02-23 | Stop reason: HOSPADM

## 2021-02-23 RX ORDER — FLUMAZENIL 0.1 MG/ML
0.2 INJECTION, SOLUTION INTRAVENOUS
Status: DISCONTINUED | OUTPATIENT
Start: 2021-02-23 | End: 2021-02-24 | Stop reason: HOSPADM

## 2021-02-23 RX ORDER — NALBUPHINE HYDROCHLORIDE 10 MG/ML
2.5-5 INJECTION, SOLUTION INTRAMUSCULAR; INTRAVENOUS; SUBCUTANEOUS EVERY 6 HOURS PRN
Status: DISCONTINUED | OUTPATIENT
Start: 2021-02-23 | End: 2021-02-25 | Stop reason: HOSPADM

## 2021-02-23 RX ORDER — KETOROLAC TROMETHAMINE 30 MG/ML
INJECTION, SOLUTION INTRAMUSCULAR; INTRAVENOUS PRN
Status: DISCONTINUED | OUTPATIENT
Start: 2021-02-23 | End: 2021-02-23

## 2021-02-23 RX ORDER — FENTANYL CITRATE 50 UG/ML
10 INJECTION, SOLUTION INTRAMUSCULAR; INTRAVENOUS ONCE
Status: DISCONTINUED | OUTPATIENT
Start: 2021-02-23 | End: 2021-02-25 | Stop reason: HOSPADM

## 2021-02-23 RX ORDER — EPHEDRINE SULFATE 50 MG/ML
5 INJECTION, SOLUTION INTRAMUSCULAR; INTRAVENOUS; SUBCUTANEOUS
Status: DISCONTINUED | OUTPATIENT
Start: 2021-02-23 | End: 2021-02-25 | Stop reason: HOSPADM

## 2021-02-23 RX ORDER — FENTANYL CITRATE-0.9 % NACL/PF 10 MCG/ML
PLASTIC BAG, INJECTION (ML) INTRAVENOUS CONTINUOUS PRN
Status: DISCONTINUED | OUTPATIENT
Start: 2021-02-23 | End: 2021-02-23

## 2021-02-23 RX ORDER — FENTANYL CITRATE 50 UG/ML
25-50 INJECTION, SOLUTION INTRAMUSCULAR; INTRAVENOUS
Status: DISCONTINUED | OUTPATIENT
Start: 2021-02-23 | End: 2021-02-24 | Stop reason: HOSPADM

## 2021-02-23 RX ORDER — FLUMAZENIL 0.1 MG/ML
0.2 INJECTION, SOLUTION INTRAVENOUS
Status: DISCONTINUED | OUTPATIENT
Start: 2021-02-23 | End: 2021-02-25 | Stop reason: HOSPADM

## 2021-02-23 RX ORDER — ONDANSETRON 2 MG/ML
4 INJECTION INTRAMUSCULAR; INTRAVENOUS EVERY 30 MIN PRN
Status: DISCONTINUED | OUTPATIENT
Start: 2021-02-23 | End: 2021-02-24 | Stop reason: HOSPADM

## 2021-02-23 RX ORDER — ONDANSETRON 2 MG/ML
INJECTION INTRAMUSCULAR; INTRAVENOUS PRN
Status: DISCONTINUED | OUTPATIENT
Start: 2021-02-23 | End: 2021-02-23

## 2021-02-23 RX ORDER — BUPIVACAINE HYDROCHLORIDE 2.5 MG/ML
INJECTION, SOLUTION EPIDURAL; INFILTRATION; INTRACAUDAL PRN
Status: DISCONTINUED | OUTPATIENT
Start: 2021-02-23 | End: 2021-02-23

## 2021-02-23 RX ORDER — CEFAZOLIN SODIUM 1 G/3ML
1 INJECTION, POWDER, FOR SOLUTION INTRAMUSCULAR; INTRAVENOUS SEE ADMIN INSTRUCTIONS
Status: DISCONTINUED | OUTPATIENT
Start: 2021-02-23 | End: 2021-02-24 | Stop reason: HOSPADM

## 2021-02-23 RX ORDER — LIDOCAINE HYDROCHLORIDE 10 MG/ML
INJECTION, SOLUTION EPIDURAL; INFILTRATION; INTRACAUDAL; PERINEURAL
Status: DISCONTINUED
Start: 2021-02-23 | End: 2021-02-23 | Stop reason: HOSPADM

## 2021-02-23 RX ORDER — SODIUM CHLORIDE, SODIUM LACTATE, POTASSIUM CHLORIDE, CALCIUM CHLORIDE 600; 310; 30; 20 MG/100ML; MG/100ML; MG/100ML; MG/100ML
INJECTION, SOLUTION INTRAVENOUS CONTINUOUS
Status: DISCONTINUED | OUTPATIENT
Start: 2021-02-23 | End: 2021-02-24 | Stop reason: HOSPADM

## 2021-02-23 RX ORDER — SODIUM CHLORIDE, SODIUM LACTATE, POTASSIUM CHLORIDE, CALCIUM CHLORIDE 600; 310; 30; 20 MG/100ML; MG/100ML; MG/100ML; MG/100ML
INJECTION, SOLUTION INTRAVENOUS CONTINUOUS
Status: DISCONTINUED | OUTPATIENT
Start: 2021-02-23 | End: 2021-02-24

## 2021-02-23 RX ORDER — SODIUM CHLORIDE, SODIUM LACTATE, POTASSIUM CHLORIDE, CALCIUM CHLORIDE 600; 310; 30; 20 MG/100ML; MG/100ML; MG/100ML; MG/100ML
INJECTION, SOLUTION INTRAVENOUS CONTINUOUS PRN
Status: DISCONTINUED | OUTPATIENT
Start: 2021-02-23 | End: 2021-02-23

## 2021-02-23 RX ORDER — MISOPROSTOL 200 UG/1
TABLET ORAL
Status: DISCONTINUED
Start: 2021-02-23 | End: 2021-02-23 | Stop reason: WASHOUT

## 2021-02-23 RX ORDER — METOCLOPRAMIDE HYDROCHLORIDE 5 MG/ML
10 INJECTION INTRAMUSCULAR; INTRAVENOUS ONCE
Status: COMPLETED | OUTPATIENT
Start: 2021-02-23 | End: 2021-02-23

## 2021-02-23 RX ORDER — LIDOCAINE 40 MG/G
CREAM TOPICAL
Status: DISCONTINUED | OUTPATIENT
Start: 2021-02-23 | End: 2021-02-23

## 2021-02-23 RX ORDER — CITRIC ACID/SODIUM CITRATE 334-500MG
30 SOLUTION, ORAL ORAL
Status: COMPLETED | OUTPATIENT
Start: 2021-02-23 | End: 2021-02-23

## 2021-02-23 RX ORDER — MAGNESIUM HYDROXIDE 1200 MG/15ML
LIQUID ORAL PRN
Status: DISCONTINUED | OUTPATIENT
Start: 2021-02-23 | End: 2021-02-25 | Stop reason: HOSPADM

## 2021-02-23 RX ORDER — OXYTOCIN/0.9 % SODIUM CHLORIDE 30/500 ML
PLASTIC BAG, INJECTION (ML) INTRAVENOUS CONTINUOUS PRN
Status: DISCONTINUED | OUTPATIENT
Start: 2021-02-23 | End: 2021-02-23

## 2021-02-23 RX ORDER — OXYTOCIN/0.9 % SODIUM CHLORIDE 30/500 ML
1-24 PLASTIC BAG, INJECTION (ML) INTRAVENOUS CONTINUOUS
Status: DISCONTINUED | OUTPATIENT
Start: 2021-02-23 | End: 2021-02-24

## 2021-02-23 RX ORDER — MORPHINE SULFATE 1 MG/ML
INJECTION, SOLUTION EPIDURAL; INTRATHECAL; INTRAVENOUS PRN
Status: DISCONTINUED | OUTPATIENT
Start: 2021-02-23 | End: 2021-02-23

## 2021-02-23 RX ORDER — MORPHINE SULFATE 1 MG/ML
150 INJECTION, SOLUTION EPIDURAL; INTRATHECAL; INTRAVENOUS ONCE
Status: DISCONTINUED | OUTPATIENT
Start: 2021-02-23 | End: 2021-02-25 | Stop reason: HOSPADM

## 2021-02-23 RX ORDER — ONDANSETRON 4 MG/1
4 TABLET, ORALLY DISINTEGRATING ORAL EVERY 30 MIN PRN
Status: DISCONTINUED | OUTPATIENT
Start: 2021-02-23 | End: 2021-02-24 | Stop reason: HOSPADM

## 2021-02-23 RX ORDER — MISOPROSTOL 200 UG/1
TABLET ORAL
Status: DISCONTINUED
Start: 2021-02-23 | End: 2021-02-23 | Stop reason: HOSPADM

## 2021-02-23 RX ORDER — BUPIVACAINE HYDROCHLORIDE 7.5 MG/ML
INJECTION, SOLUTION INTRASPINAL PRN
Status: DISCONTINUED | OUTPATIENT
Start: 2021-02-23 | End: 2021-02-23

## 2021-02-23 RX ORDER — LIDOCAINE 40 MG/G
CREAM TOPICAL
Status: DISCONTINUED | OUTPATIENT
Start: 2021-02-23 | End: 2021-02-25 | Stop reason: HOSPADM

## 2021-02-23 RX ORDER — OXYTOCIN/0.9 % SODIUM CHLORIDE 30/500 ML
PLASTIC BAG, INJECTION (ML) INTRAVENOUS
Status: DISCONTINUED
Start: 2021-02-23 | End: 2021-02-23 | Stop reason: HOSPADM

## 2021-02-23 RX ORDER — CEFAZOLIN SODIUM 2 G/100ML
2 INJECTION, SOLUTION INTRAVENOUS
Status: COMPLETED | OUTPATIENT
Start: 2021-02-23 | End: 2021-02-23

## 2021-02-23 RX ORDER — FENTANYL CITRATE 50 UG/ML
INJECTION, SOLUTION INTRAMUSCULAR; INTRAVENOUS PRN
Status: DISCONTINUED | OUTPATIENT
Start: 2021-02-23 | End: 2021-02-23

## 2021-02-23 RX ADMIN — MORPHINE SULFATE 0.15 MG: 1 INJECTION EPIDURAL; INTRATHECAL; INTRAVENOUS at 20:10

## 2021-02-23 RX ADMIN — OXYTOCIN-SODIUM CHLORIDE 0.9% IV SOLN 30 UNIT/500ML 100 ML/HR: 30-0.9/5 SOLUTION at 22:24

## 2021-02-23 RX ADMIN — Medication 500 MG: at 20:20

## 2021-02-23 RX ADMIN — SODIUM CHLORIDE, POTASSIUM CHLORIDE, SODIUM LACTATE AND CALCIUM CHLORIDE: 600; 310; 30; 20 INJECTION, SOLUTION INTRAVENOUS at 11:07

## 2021-02-23 RX ADMIN — Medication 50 MCG/MIN: at 20:10

## 2021-02-23 RX ADMIN — BUPIVACAINE 20 ML: 13.3 INJECTION, SUSPENSION, LIPOSOMAL INFILTRATION at 21:40

## 2021-02-23 RX ADMIN — ONDANSETRON 4 MG: 2 INJECTION INTRAMUSCULAR; INTRAVENOUS at 20:44

## 2021-02-23 RX ADMIN — KETOROLAC TROMETHAMINE 30 MG: 30 INJECTION, SOLUTION INTRAMUSCULAR at 21:12

## 2021-02-23 RX ADMIN — SODIUM CHLORIDE, POTASSIUM CHLORIDE, SODIUM LACTATE AND CALCIUM CHLORIDE: 600; 310; 30; 20 INJECTION, SOLUTION INTRAVENOUS at 20:03

## 2021-02-23 RX ADMIN — Medication 2 MILLI-UNITS/MIN: at 03:15

## 2021-02-23 RX ADMIN — METOCLOPRAMIDE HYDROCHLORIDE 10 MG: 5 INJECTION INTRAMUSCULAR; INTRAVENOUS at 15:09

## 2021-02-23 RX ADMIN — NALBUPHINE HYDROCHLORIDE 2.5 MG: 10 INJECTION, SOLUTION INTRAMUSCULAR; INTRAVENOUS; SUBCUTANEOUS at 22:49

## 2021-02-23 RX ADMIN — OXYTOCIN-SODIUM CHLORIDE 0.9% IV SOLN 30 UNIT/500ML 300 ML/HR: 30-0.9/5 SOLUTION at 20:40

## 2021-02-23 RX ADMIN — FENTANYL CITRATE 50 MCG: 50 INJECTION, SOLUTION INTRAMUSCULAR; INTRAVENOUS at 22:31

## 2021-02-23 RX ADMIN — BUPIVACAINE HYDROCHLORIDE IN DEXTROSE 1.6 ML: 7.5 INJECTION, SOLUTION SUBARACHNOID at 20:10

## 2021-02-23 RX ADMIN — FENTANYL CITRATE 10 MCG: 50 INJECTION, SOLUTION INTRAMUSCULAR; INTRAVENOUS at 20:10

## 2021-02-23 RX ADMIN — SODIUM CITRATE AND CITRIC ACID MONOHYDRATE 30 ML: 500; 334 SOLUTION ORAL at 19:58

## 2021-02-23 RX ADMIN — SODIUM CHLORIDE, POTASSIUM CHLORIDE, SODIUM LACTATE AND CALCIUM CHLORIDE: 600; 310; 30; 20 INJECTION, SOLUTION INTRAVENOUS at 18:18

## 2021-02-23 RX ADMIN — BUPIVACAINE HYDROCHLORIDE 20 ML: 2.5 INJECTION, SOLUTION EPIDURAL; INFILTRATION; INTRACAUDAL at 21:40

## 2021-02-23 RX ADMIN — Medication 2 G: at 20:20

## 2021-02-23 RX ADMIN — SODIUM CHLORIDE, POTASSIUM CHLORIDE, SODIUM LACTATE AND CALCIUM CHLORIDE: 600; 310; 30; 20 INJECTION, SOLUTION INTRAVENOUS at 03:14

## 2021-02-23 ASSESSMENT — LIFESTYLE VARIABLES
TOBACCO_USE: 1
TOBACCO_USE: 1

## 2021-02-23 NOTE — PROGRESS NOTES
"Labor progress note    S:  Pt is noting discomfort in her leg when she has contractions  FOB here now  Pt is requesting a repeat C/S  \" I'm over this.   I just want a c/s.  They can tie by tubes at the same time \"  OB resident asked to come discuss this with pt  Dr Valenzuela aware.     O:  Blood pressure 106/55, pulse 76, temperature 98.1  F (36.7  C), temperature source Oral, resp. rate 18, height 1.702 m (5' 7\"), weight 80.7 kg (178 lb), not currently breastfeeding.  General appearance: uncomfortable with contractions.  Contractions: Every 3-4 minutes. 60 seconds duration.  Palpate: mild.  FHT: Baseline 140 with mod  variability. Accelerations are present. no decelerations present.  ROM: clear fluid. Membranes have been ruptured for 16 hours.  Pelvic exam: 1.5/ 60%/ Mid/ average/ -2  Pitocin- 16 mu/min.,  Antibiotics- none    A:  IUP @ 36w0d IOL PPROM  and IUGR   Fetal Heart rate tracing Category category one  GBS- negative  Patient Active Problem List   Diagnosis     Asthma with allergic rhinitis     Pregnancy affected by fetal growth restriction     Not immune to hepatitis B virus     Vitamin D deficiency     Supervision of high-risk pregnancy     History of 2  sections- TOLAC signed 2/3/21     Marijuana use     Abnormal glucose tolerance test during pregnancy, antepartum     Encounter for triage in pregnant patient     Labor and delivery indication for care or intervention     ASCUS of cervix with negative high risk HPV         P:  Anticipate   MD consultant on call Dr Valenzuela/ available prn  See MD discussion pt desires repeat C/S      JENIFFER Estevez CNM  "

## 2021-02-23 NOTE — PROVIDER NOTIFICATION
02/23/21 1030   Provider Notification   Provider Name/Title Felisa Wagoner CNM   Method of Notification In Department   Notification Reason Status Update   Update given to CHERYL Roberto: Pit at 16 mu/minute. Pt is TOLAC with previous C/Sections X 2 and severe fetal growth restriction. Ctx palpate mild and irregular. Pt reports cramping and said she continues to leak clear fluid. Currently pt is sleeping. FHT Category 1. This RN requesting IUPC in order to safely continue induction.No further orders. Will continue to monitor closely and notify provider of changes and concerns

## 2021-02-23 NOTE — PROVIDER NOTIFICATION
02/23/21 0620   Provider Notification   Provider Name/Title [facility]   Method of Notification In Department   Request Evaluate - Remote   discussed strip with CNM, agreed that strip is CAT I and possible late decels are a return to baseline.

## 2021-02-23 NOTE — PROGRESS NOTES
"Blood pressure 120/68, pulse 76, temperature 98.2  F (36.8  C), temperature source Oral, resp. rate 16, height 1.702 m (5' 7\"), weight 80.7 kg (178 lb), not currently breastfeeding.  Patient Vitals for the past 24 hrs:   BP Temp Temp src Pulse Resp Height Weight   21 0509 120/68 98.2  F (36.8  C) Oral -- 16 -- --   21 0410 -- 98.4  F (36.9  C) Oral -- -- -- --   21 0313 118/63 98.4  F (36.9  C) Oral 76 16 -- --   21 0147 -- 98.9  F (37.2  C) Oral -- 16 -- --   21 0022 -- 98.4  F (36.9  C) Oral -- -- -- --   21 2244 121/66 98.3  F (36.8  C) Oral 82 18 1.702 m (5' 7\") 80.7 kg (178 lb)     General appearance: Pitocin was started at 0315, pt has been resting  CONTRACTIONS: every 3-5 minutes  Pitocin- 6 mu/min.,  Antibiotics- none  FETAL HEART TONES: continuous EFM- baseline 130 with moderate variability and positive accelerations. No decelerations.  ROM: clear fluid  PELVIC EXAM:deferred    # Pain Assessment:  Current Pain Score 2021   Patient currently in pain? yes   Pain score (0-10) -   Pain location -   Pain descriptors -   resting.    ASSESSMENT:  ==============  IUP @ 36w0d, PPROM  Fetal Heart Rate Tracing category one  GBS- negative    PPROM @ 2149, clear, afebrile    Pitocin @ 6mu    TOLAC     Patient Active Problem List   Diagnosis     Asthma with allergic rhinitis     Pregnancy affected by fetal growth restriction     Not immune to hepatitis B virus     Vitamin D deficiency     Supervision of high-risk pregnancy     History of 2  sections- TOLAC signed 2/3/21     Marijuana use     Abnormal glucose tolerance test during pregnancy, antepartum     Encounter for triage in pregnant patient     Labor and delivery indication for care or intervention     ASCUS of cervix with negative high risk HPV     PLAN:  ===========  Continue pitocin titration.  Monitor fluid color, maternal temp, fhr.   Reevaluate prn per maternal/fetal indications.    JENIFFER Mitchell, CNM "
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
## 2021-02-23 NOTE — PROGRESS NOTES
"Labor progress note    S:  Patient lying comfortably in bed. States that she feels contractions becoming stronger and closer together with some pelvic pressure. Requesting SVE.    O:  Blood pressure 124/69, pulse 76, temperature 98.2  F (36.8  C), temperature source Oral, resp. rate 16, height 1.702 m (5' 7\"), weight 80.7 kg (178 lb), not currently breastfeeding.  General appearance: uncomfortable with contractions.  Contractions: Every 3-5 minutes. 40-70 seconds duration.  Palpate: mild with soft resting tone.  FHT: Baseline 135 with moderate variability. Accelerations present. No decelerations present.  ROM: clear fluid. Membranes have been ruptured for 10 hours.  Pelvic exam: 2/ 75%/ Mid/ soft/ -2  Acuña Score: 8  -------------------------------  Pitocin- 10 mu/min.,  Antibiotics- none  IV fluids at maintenance dose    A:  25 year old  female with IUP @ 36w0d early labor   Fetal Heart rate tracing Category category one  GBS- negative    PPROM @ 2149, clear fluid and afebrile  Pitocin @ 10mu  TOLAC    Patient Active Problem List   Diagnosis     Asthma with allergic rhinitis     Pregnancy affected by fetal growth restriction     Not immune to hepatitis B virus     Vitamin D deficiency     Supervision of high-risk pregnancy     History of 2  sections- TOLAC signed 2/3/21     Marijuana use     Abnormal glucose tolerance test during pregnancy, antepartum     Encounter for triage in pregnant patient     Labor and delivery indication for care or intervention     ASCUS of cervix with negative high risk HPV       P:  Continue IOL with pitocin  Continuous external fetal monitoring  Encouraged frequent position changes and upright positioning for labor facilitation  Reevaluate 2-4 hrs or PRN    Vijaya BUTTS SNM am serving as a scribe; to document services personally performed by  Felisa Wagoner CNM based on data collection and the provider's statements to me.     RAMSES Bello  The encounter was performed " by me and scribed by the SNM. The scribed note accurately reflects my personal services and decisions made by me.   Felisa Ceballos CNM APRN

## 2021-02-23 NOTE — PLAN OF CARE
Problem: Adult Inpatient Plan of Care  Goal: Plan of Care Review  Outcome: Change based on patient need/priority  Goal: Patient-Specific Goal (Individualized)  Outcome: Change based on patient need/priority  Goal: Absence of Hospital-Acquired Illness or Injury  Outcome: Change based on patient need/priority  Goal: Optimal Comfort and Wellbeing  Outcome: Change based on patient need/priority  Intervention: Provide Person-Centered Care  Recent Flowsheet Documentation  Taken 2/23/2021 1227 by Cyndy Perera, RN  Trust Relationship/Rapport:   care explained   choices provided   emotional support provided   empathic listening provided   questions answered   questions encouraged   reassurance provided   thoughts/feelings acknowledged  Taken 2/23/2021 0730 by Cyndy Perera, RN  Trust Relationship/Rapport:   care explained   choices provided   emotional support provided   empathic listening provided   questions answered   questions encouraged   reassurance provided   thoughts/feelings acknowledged  Goal: Readiness for Transition of Care  Outcome: Change based on patient need/priority     Problem: Bleeding (Labor)  Goal: Hemostasis  Outcome: Change based on patient need/priority     Problem: Change in Fetal Wellbeing (Labor)  Goal: Stable Fetal Wellbeing  Outcome: Change based on patient need/priority  Intervention: Promote and Monitor Fetal Wellbeing  Recent Flowsheet Documentation  Taken 2/23/2021 1227 by Cyndy Perera, RN  Fetal Wellbeing Promotion: (pit off pt reqesting repeat C/Section and tubal ligation)   obstetric care provider contacted   uterine contraction activity assessed     Problem: Delayed Labor Progression (Labor)  Goal: Effective Progression to Delivery  Outcome: Change based on patient need/priority     Problem: Infection (Labor)  Goal: Absence of Infection Signs and Symptoms  Outcome: Change based on patient need/priority     Problem: Labor Pain (Labor)  Goal: Acceptable Pain  Control  Outcome: Change based on patient need/priority  Intervention: Support Labor Pain Coping and Management  Recent Flowsheet Documentation  Taken 2/23/2021 0730 by Cyndy Perera, RN  Complementary Therapy: essential oils utilized     Problem: Uterine Tachysystole (Labor)  Goal: Normal Uterine Contraction Pattern  Outcome: Change based on patient need/priority   Pt decided to stop induction of labor and proceed with C/Section. Pitocin off. Instructed to be NPO. Dr Borrero in. Consents signed. Will prep for C/Section and TL for aprox 1430.
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
## 2021-02-23 NOTE — PROGRESS NOTES
Brief progress note    Asked by Jessica Fleming CNM to assist in evaluating Rajan for PPROM.     Rajan Graham is a 25 year old  at 36w0d with pregnancy notable for severe fetal growth restriction (EFW & AC<1st %ile), prior CSx1 presented for leaking clear fluid at home. ROM+ positive. Initially no ferning seen on slide. Repeat swab showed ferning.     On bedside ultrasound, fetus is cephalic, >30 seconds of fetal practice breathing seen, movements of lower limbs and trunk seen, flexion of upper extremity seen. MVP was 2.1. SHERITA 3 with absent fluid in two quadrants. MVP back on  was 5.9.     The clinical picture is consistent with PPROM. As she is >34w, would recommend augmentation with pitocin.     Maria D Fields MD MSc  OBGYN Resident, PGY2  2021, 2:38 AM

## 2021-02-23 NOTE — PLAN OF CARE
[facility] with gross rupture of clear fluids at 2149. Pt has occasional contractions that are not painful at this time. Baby has moderate variability. No accelerations noted yet. Covid swab sent. MARCY Fleming notified of arrival.

## 2021-02-23 NOTE — PROGRESS NOTES
-ROM plus positive when pt presented to BP.  - Small amount of fluid seen on pad. Slide was collected to look for ferning. Initial slide without ferning present.   - Consulted with Dr. Fields. Bedside ultrasound performed- MVP 2.19cm.    - Discussed importance of ensuring PPROM before augmenting with pitocin as pt is . Pt declines speculum exam but agreeable to another swab for ferning.   - Moderate amount of clear fluid seen a perineum.  Slide collected. Large amount of ferning seen under microscope.      - Consulted with Dr. Mccoy who agrees with plan for augmentation.     - Pt agreeable to initiation of pitocin.   Cat 1 fetal tracing.    JENIFFER Mitchell, MAGGIEM

## 2021-02-23 NOTE — PLAN OF CARE
Pt agreed to augmentation with pitocin after ROM was confirmed from fern slide and US. Feeling contractions stronger but still able to sleep. Pt still leaking clear fluid. FOB at bedside. Planing for unmedicated . Very few intermittent late decels during shift but otherwise CAT I FHR.
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
## 2021-02-23 NOTE — PROVIDER NOTIFICATION
02/23/21 1227   Provider Notification   Provider Name/Title Felisa Wagoner CNM   Method of Notification In Department;At Bedside   Request Evaluate in Person   Notification Reason Status Update;SVE   CHERYL Roberto at bedside to discuss placement of IUPC. Pt prefers to stop induction and have C/Section and tubal ligation. Pitocin stopped. Pt instructed to be NPO, last ate at 0830. Dr Valenzuela and Dr Borrero notified to discuss and consent pt for surgery by CHERYL Roberto.
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
## 2021-02-24 LAB — HGB BLD-MCNC: 10.6 G/DL (ref 11.7–15.7)

## 2021-02-24 PROCEDURE — 258N000003 HC RX IP 258 OP 636: Performed by: STUDENT IN AN ORGANIZED HEALTH CARE EDUCATION/TRAINING PROGRAM

## 2021-02-24 PROCEDURE — 250N000011 HC RX IP 250 OP 636: Performed by: STUDENT IN AN ORGANIZED HEALTH CARE EDUCATION/TRAINING PROGRAM

## 2021-02-24 PROCEDURE — 120N000002 HC R&B MED SURG/OB UMMC

## 2021-02-24 PROCEDURE — 36416 COLLJ CAPILLARY BLOOD SPEC: CPT | Performed by: STUDENT IN AN ORGANIZED HEALTH CARE EDUCATION/TRAINING PROGRAM

## 2021-02-24 PROCEDURE — 0UT70ZZ RESECTION OF BILATERAL FALLOPIAN TUBES, OPEN APPROACH: ICD-10-PCS | Performed by: OBSTETRICS & GYNECOLOGY

## 2021-02-24 PROCEDURE — 250N000013 HC RX MED GY IP 250 OP 250 PS 637: Performed by: STUDENT IN AN ORGANIZED HEALTH CARE EDUCATION/TRAINING PROGRAM

## 2021-02-24 PROCEDURE — 85018 HEMOGLOBIN: CPT | Performed by: STUDENT IN AN ORGANIZED HEALTH CARE EDUCATION/TRAINING PROGRAM

## 2021-02-24 RX ORDER — OXYTOCIN/0.9 % SODIUM CHLORIDE 30/500 ML
100 PLASTIC BAG, INJECTION (ML) INTRAVENOUS CONTINUOUS
Status: DISCONTINUED | OUTPATIENT
Start: 2021-02-24 | End: 2021-02-25 | Stop reason: HOSPADM

## 2021-02-24 RX ORDER — OXYTOCIN 10 [USP'U]/ML
10 INJECTION, SOLUTION INTRAMUSCULAR; INTRAVENOUS
Status: DISCONTINUED | OUTPATIENT
Start: 2021-02-24 | End: 2021-02-25 | Stop reason: HOSPADM

## 2021-02-24 RX ORDER — NALOXONE HYDROCHLORIDE 0.4 MG/ML
0.4 INJECTION, SOLUTION INTRAMUSCULAR; INTRAVENOUS; SUBCUTANEOUS
Status: ACTIVE | OUTPATIENT
Start: 2021-02-24 | End: 2021-02-25

## 2021-02-24 RX ORDER — AMOXICILLIN 250 MG
2 CAPSULE ORAL 2 TIMES DAILY
Status: DISCONTINUED | OUTPATIENT
Start: 2021-02-24 | End: 2021-02-25 | Stop reason: HOSPADM

## 2021-02-24 RX ORDER — MODIFIED LANOLIN
OINTMENT (GRAM) TOPICAL
Status: DISCONTINUED | OUTPATIENT
Start: 2021-02-24 | End: 2021-02-25 | Stop reason: HOSPADM

## 2021-02-24 RX ORDER — DIPHENHYDRAMINE HCL 25 MG
25 CAPSULE ORAL EVERY 6 HOURS PRN
Status: DISCONTINUED | OUTPATIENT
Start: 2021-02-24 | End: 2021-02-25 | Stop reason: HOSPADM

## 2021-02-24 RX ORDER — BISACODYL 10 MG
10 SUPPOSITORY, RECTAL RECTAL DAILY PRN
Status: DISCONTINUED | OUTPATIENT
Start: 2021-02-25 | End: 2021-02-25 | Stop reason: HOSPADM

## 2021-02-24 RX ORDER — LIDOCAINE 40 MG/G
CREAM TOPICAL
Status: DISCONTINUED | OUTPATIENT
Start: 2021-02-24 | End: 2021-02-25 | Stop reason: HOSPADM

## 2021-02-24 RX ORDER — HYDROCORTISONE 2.5 %
CREAM (GRAM) TOPICAL 3 TIMES DAILY PRN
Status: DISCONTINUED | OUTPATIENT
Start: 2021-02-24 | End: 2021-02-25 | Stop reason: HOSPADM

## 2021-02-24 RX ORDER — OXYCODONE HYDROCHLORIDE 5 MG/1
5 TABLET ORAL EVERY 4 HOURS PRN
Status: DISCONTINUED | OUTPATIENT
Start: 2021-02-24 | End: 2021-02-25 | Stop reason: HOSPADM

## 2021-02-24 RX ORDER — AMOXICILLIN 250 MG
1 CAPSULE ORAL 2 TIMES DAILY
Status: DISCONTINUED | OUTPATIENT
Start: 2021-02-24 | End: 2021-02-25 | Stop reason: HOSPADM

## 2021-02-24 RX ORDER — OXYTOCIN/0.9 % SODIUM CHLORIDE 30/500 ML
340 PLASTIC BAG, INJECTION (ML) INTRAVENOUS CONTINUOUS PRN
Status: DISCONTINUED | OUTPATIENT
Start: 2021-02-24 | End: 2021-02-25 | Stop reason: HOSPADM

## 2021-02-24 RX ORDER — ONDANSETRON 2 MG/ML
4 INJECTION INTRAMUSCULAR; INTRAVENOUS EVERY 6 HOURS PRN
Status: DISCONTINUED | OUTPATIENT
Start: 2021-02-24 | End: 2021-02-25 | Stop reason: HOSPADM

## 2021-02-24 RX ORDER — DEXTROSE, SODIUM CHLORIDE, SODIUM LACTATE, POTASSIUM CHLORIDE, AND CALCIUM CHLORIDE 5; .6; .31; .03; .02 G/100ML; G/100ML; G/100ML; G/100ML; G/100ML
INJECTION, SOLUTION INTRAVENOUS CONTINUOUS
Status: DISCONTINUED | OUTPATIENT
Start: 2021-02-24 | End: 2021-02-25 | Stop reason: HOSPADM

## 2021-02-24 RX ORDER — KETOROLAC TROMETHAMINE 30 MG/ML
30 INJECTION, SOLUTION INTRAMUSCULAR; INTRAVENOUS EVERY 6 HOURS
Status: DISPENSED | OUTPATIENT
Start: 2021-02-24 | End: 2021-02-24

## 2021-02-24 RX ORDER — ACETAMINOPHEN 325 MG/1
975 TABLET ORAL EVERY 6 HOURS
Status: DISCONTINUED | OUTPATIENT
Start: 2021-02-24 | End: 2021-02-25 | Stop reason: HOSPADM

## 2021-02-24 RX ORDER — NALOXONE HYDROCHLORIDE 0.4 MG/ML
0.2 INJECTION, SOLUTION INTRAMUSCULAR; INTRAVENOUS; SUBCUTANEOUS
Status: ACTIVE | OUTPATIENT
Start: 2021-02-24 | End: 2021-02-25

## 2021-02-24 RX ORDER — IBUPROFEN 800 MG/1
800 TABLET, FILM COATED ORAL EVERY 6 HOURS
Status: DISCONTINUED | OUTPATIENT
Start: 2021-02-24 | End: 2021-02-25 | Stop reason: HOSPADM

## 2021-02-24 RX ORDER — SIMETHICONE 80 MG
80 TABLET,CHEWABLE ORAL 4 TIMES DAILY PRN
Status: DISCONTINUED | OUTPATIENT
Start: 2021-02-24 | End: 2021-02-25 | Stop reason: HOSPADM

## 2021-02-24 RX ADMIN — SODIUM CHLORIDE, POTASSIUM CHLORIDE, SODIUM LACTATE AND CALCIUM CHLORIDE 500 ML: 600; 310; 30; 20 INJECTION, SOLUTION INTRAVENOUS at 08:58

## 2021-02-24 RX ADMIN — DIPHENHYDRAMINE HYDROCHLORIDE 25 MG: 25 CAPSULE ORAL at 12:14

## 2021-02-24 RX ADMIN — OXYCODONE HYDROCHLORIDE 5 MG: 5 TABLET ORAL at 17:04

## 2021-02-24 RX ADMIN — ACETAMINOPHEN 975 MG: 325 TABLET, FILM COATED ORAL at 01:33

## 2021-02-24 RX ADMIN — DOCUSATE SODIUM AND SENNOSIDES 1 TABLET: 8.6; 5 TABLET ORAL at 21:04

## 2021-02-24 RX ADMIN — DOCUSATE SODIUM AND SENNOSIDES 1 TABLET: 8.6; 5 TABLET ORAL at 08:03

## 2021-02-24 RX ADMIN — ACETAMINOPHEN 975 MG: 325 TABLET, FILM COATED ORAL at 08:03

## 2021-02-24 RX ADMIN — KETOROLAC TROMETHAMINE 30 MG: 30 INJECTION, SOLUTION INTRAMUSCULAR; INTRAVENOUS at 04:32

## 2021-02-24 RX ADMIN — ONDANSETRON 4 MG: 2 INJECTION INTRAMUSCULAR; INTRAVENOUS at 03:05

## 2021-02-24 RX ADMIN — ACETAMINOPHEN 975 MG: 325 TABLET, FILM COATED ORAL at 14:45

## 2021-02-24 RX ADMIN — OXYCODONE HYDROCHLORIDE 5 MG: 5 TABLET ORAL at 21:12

## 2021-02-24 RX ADMIN — KETOROLAC TROMETHAMINE 30 MG: 30 INJECTION, SOLUTION INTRAMUSCULAR; INTRAVENOUS at 10:46

## 2021-02-24 RX ADMIN — IBUPROFEN 800 MG: 800 TABLET, FILM COATED ORAL at 17:03

## 2021-02-24 RX ADMIN — ACETAMINOPHEN 975 MG: 325 TABLET, FILM COATED ORAL at 21:05

## 2021-02-24 NOTE — PROGRESS NOTES
Sitting up in bed comfortably pumping breastmilk. States pain is well managed on current regimen. Has been able to mobilize out of bed without any issues. Attempted to go to the washroom but has still not been able to urinate or pass a bowel movement. Is not passing gas currently. Instructed patient to continue to mobilize out of bed and if she is still unable to urinate or pass gas/BM to alert nursing staff in order to get a bowel regimen/bladder scan. Having general pruritis. Instructed patient to take benadryl and nubain, will re-evaluate later.     Aaron Moreno MD  Anesthesiology CA-1

## 2021-02-24 NOTE — PLAN OF CARE
Pt desires to leave unit now to go smoke. Discussed risks of smoking post-op and advised smoking cessation.  Went over various options including patches and gum. Pt not willing to wait until saline lock removed (needs LR bolus now and Toradol), agreeable to escort and supervision while off unit. U-tox was negative on admission to labor.

## 2021-02-24 NOTE — PROVIDER NOTIFICATION
02/24/21 1728   Provider Notification   Provider Name/Title Dr Borrero   Method of Notification Electronic Page   Request Evaluate-Remote   Notification Reason Other   Please come up to talk with pt when able (after out of OR).  She would like to discuss discharging this evening after 24 hours if possible.  Thank you.    Update:  Dr Borrero will come talk with pt about staying inpatient overnight for further monitoring and morning labs.

## 2021-02-24 NOTE — PROGRESS NOTES
Patient arrived to [facility] unit via zoom cart at 0040,with belongings, accompanied by spouse/ significant other, with infant in NICU. Received report from Ewelina. Unit and room orientation started. Call light given; no concerns present at this time. Continue with plan of care.

## 2021-02-24 NOTE — BRIEF OP NOTE
Brief Operative Note    Name: Rajan Graham  MRN: [id number]  : 1995  Date of Surgery: 2021    Pre-operative Diagnosis:  - IUP @ 36w0d  - PPROM at 35w6d  - FGR (EFW, AC <1%ile)  - Hx CS x2  - Elevated GCT, with no GTT    Post-operative Diagnosis:  - Postpartum s/p delivery of viable female infant    Procedure(s):   - Repeat high transverse  section with double layer uterine closure via Pfannenstiel skin incision    Surgeon:  - Shaneka Valenzuela MD    Assistants:  - Maria D Fields MD, PGY2  - Ruth Livingston, MS3     Anesthesia: Spinal  QBL: 208 mL  UOP: 50 mL clear urine  Fluids: 600 mL crystalloid  Additional Medications: Ancef 2g preop, Azithromycin 500mg    Specimens:   ID Type Source Tests Collected by Time Destination   1 :  Cord blood Umbilical Cord OR DOCUMENTATION ONLY Shaneka Valenzuela MD 2021  8:35 PM    2 :  Cord blood, arterial Umbilical Cord OR DOCUMENTATION ONLY Shaneka Valenzuela MD 2021  8:54 PM    3 :  Cord blood, venous Umbilical Cord OR DOCUMENTATION ONLY Shaneka Valenzuela MD 2021  8:54 PM    4 :  Placenta Placenta SEE PROVIDERS ORDERS Shaneka Valenzuela MD 2021  8:54 PM    5 :  Tissue Fallopian Tube, Right SEE PROVIDERS ORDERS Shaneka Valenzuela MD 2021  8:55 PM    6 :  Tissue Fallopian Tube, Left SEE PROVIDERS ORDERS Shaneka Valenzuela MD 2021  8:55 PM      Complications: None apparent    Findings:   - Viable female infant delivered at 2237 on 2021 with APGARs 7 and 8. Weight 1880g.   - Cord gasses: arterial pH 7.22, base deficit 5.6  - Clear amniotic fluid, Placenta anterior, intact.   - Normal uterus, tubes, and ovaries.   - Moderate rectofasical adhesions, filmy adhesions of bladder peritoneum to the lower uterine segment, apron of omentum draped over uterus, displaced with adhesions taken down with blunt dissection  - Surgical sites noted to be hemostatic at the end of case.     Maria D Fields MD MSc  OBGYN Resident,  PGY2  February 23, 2021, 9:59 PM

## 2021-02-24 NOTE — ANESTHESIA CARE TRANSFER NOTE
Patient: Rajan Graham    Procedure(s):   SECTION, WITH BILATERAL SALPINGECTOMY    Diagnosis: * No pre-op diagnosis entered *  Diagnosis Additional Information: No value filed.    Anesthesia Type:   Spinal     Note:      Level of Consciousness: awake  Oxygen Supplementation: room air    Independent Airway: airway patency satisfactory and stable  Dentition: dentition unchanged  Vital Signs Stable: post-procedure vital signs reviewed and stable  Report to RN Given: handoff report given  Patient transferred to: PACU    Handoff Report: Identifed the Patient, Identified the Reponsible Provider, Reviewed the pertinent medical history, Discussed the surgical course, Reviewed Intra-OP anesthesia mangement and issues during anesthesia, Set expectations for post-procedure period and Allowed opportunity for questions and acknowledgement of understanding      Vitals: (Last set prior to Anesthesia Care Transfer)  CRNA VITALS  20215 - 2021             NIBP:  (!) 140/104    Pulse:  68    NIBP Mean:  111    Ht Rate:  65    SpO2:  100 %        Electronically Signed By: Serina Mitchell MD  2021  10:00 PM

## 2021-02-24 NOTE — PROGRESS NOTES
Pt in PACU. Received bedside SBAR from MICHAEL Deleon RN. Pt is resting low-semi/fowlers with eyes closed no complaints. VSS, afebrile. Fundus F/1below/scant bleeding. pericare provided. Pfeiffer to be removed per provider at 2 hr jose of recovery. Pt has been tolerating icechips and water well. LR and pitocin #2 (infusing at 100 ml/hr) verified with Pancho MALDONADO during report.  stepping out to get a drink, will return.

## 2021-02-24 NOTE — OP NOTE
[facility] [place]  Operative Note  Name: Rajan Graham  MRN: [id number]  : 1995  Date of Surgery: 2021     Pre-operative Diagnosis:  - IUP @ 36w0d  - PPROM at 35w6d  - FGR (EFW, AC <1%ile)  - Hx CS x2  - Elevated GCT, with no GTT  - Tobacco use  - Desires permanent sterilization     Post-operative Diagnosis:  - Postpartum s/p delivery of viable female infant     Procedure(s):   - Repeat high transverse  section with double layer uterine closure via Pfannenstiel skin incision  - Bilateral salpingectomy     Surgeon:  - Shaneka Valenzuela MD     Assistants:  - Maria D Fields MD, PGY2  - Ruth Livingston MS3      Anesthesia: Spinal  QBL: 208 mL  UOP: 50 mL clear urine  Fluids: 600 mL crystalloid  Additional Medications: Ancef 2g preop, Azithromycin 500mg     Specimens:       ID Type Source Tests Collected by Time Destination   1 :  Cord blood Umbilical Cord OR DOCUMENTATION ONLY Shaneka Valenzuela MD 2021  8:35 PM     2 :  Cord blood, arterial Umbilical Cord OR DOCUMENTATION ONLY Shaneka Valenzuela MD 2021  8:54 PM     3 :  Cord blood, venous Umbilical Cord OR DOCUMENTATION ONLY Shaneka Valenzuela MD 2021  8:54 PM     4 :  Placenta Placenta SEE PROVIDERS ORDERS Shaneka Valenzuela MD 2021  8:54 PM     5 :  Tissue Fallopian Tube, Right SEE PROVIDERS ORDERS Shaneka Valenzuela MD 2021  8:55 PM     6 :  Tissue Fallopian Tube, Left SEE PROVIDERS ORDERS Shaneka Valenzuela MD 2021  8:55 PM        Complications: None apparent     Findings:   - Viable female infant delivered at 2237 on 2021 with APGARs 7 and 8. Weight 1880g.   - Cord gasses: arterial pH 7.22, base deficit 5.6  - Clear amniotic fluid, Placenta anterior, intact.   - Normal uterus, tubes, and ovaries.   - Moderate rectofasical adhesions, filmy adhesions of bladder peritoneum to the lower uterine segment, apron of omentum draped over uterus, displaced with adhesions taken down  with blunt dissection  - Surgical sites noted to be hemostatic at the end of case.      Indications: Rajan Graham is a 25 year old now  who presented at 35w6d for PPROM. Her pregnancy was notable for severe fetal growth restriciton (EFW, AC <1st percentile) and tobacco use. Her induction was started with pitocin but she made minimal change. She ultimately requested a repeat  section. Risks and benefits were reviewed and the patient consented for the procedure.    Procedure Details: The patient was taken back to the operating room where she underwent spinal anesthesia. She was prepped and draped in the usual sterile fashion in the dorsal supine position with a leftward tilt. A safety patient time out was performed. 2 gm Ancef and 500mg azithromycin were administered. A Pfannenstiel skin incision was made with the scalpel and carried through the underlying layer to the fascia. The fascia was incised in the midline and extended laterally with Ordaz scissors. The superior aspect of the fascial incision was grasped with kocher clamps, elevated and the underlying rectus muscles dissected off with a combination of blunt and sharp dissection. Attention was then turned to the inferior aspect of the incision, which in a similar fashion was grasped, tented up and the rectus muscle dissected off the fascia. The rectus muscles were  in the midline. The peritoneum was identified and entered bluntly. This was extended with blunt dissection. The bladder blade was inserted. There were filmy adhesions of bladder peritoneum to the lower uterine segment, apron of omentum draped over uterus. Window of free peritoneum identified and was entered. Omentum was displaced laterally to better visualize lower uterine segment. Pickups and Metzenbaum scissors were used to create a bladder flap from the filmy adhesions form bladder peritoneum to lower uterine segment. The bladder flap was created digitally and the  bladder blade was replaced. The lower uterine segment was incised in a transverse fashion with the scalpel. The incision was extended digitally. The bladder blade was removed. The infant was noted to be CAROLYN and was delivered atraumatically. After delayed cord clamping, the cord was cut, and the infant was handed off to the waiting NICU staff.  A segment of cord was taken for cord gases. The placenta was removed with gentle traction on the cord and fundal message. The uterus was exteriorized and cleared of all clots and debris. The uterine incision was repaired with 0-Vicryl in a running locked fashion. A second layer of 0-monocryl was used to imbricate the incision. Uterine atony was controlled with fundal massage, pitocin. The posterior cul-de-sac was cleared of clots and debris. Attention was then turned to the fallopian tubes, which were excised using hand-held ligasure device. Excellent hemostasis noted at cornua. The uterus was returned to the abdomen and noted to be hemostatic. The gutters were cleared of clots. A kocher clamp was used to elevated the fascia superiorly and inferiorly and good hemostasis was noted.  The fascia was closed with 0-vicryl in a running fashion. The subcutaneous tissue was irrigated and areas of bleeding were controlled with cautery. The skin was closed with 4-0 monocryl. The patient tolerated the procedure well and was taken to the recovery room in stable condition. All lap, instrument, and sharps counts were correct times two. Dr. Valenzuela was scrubbed and present for the procedure.     Maria D Fields MD MSc  OBGYN Resident, PGY2  February 24, 2021, 7:58 AM    I was present and scrubbed throughout the procedure,  I agree with the note above  Shaneka Valenzuela MD

## 2021-02-24 NOTE — PROVIDER NOTIFICATION
02/24/21 1500   Provider Notification   Provider Name/Title Dr Borrero, G2   Method of Notification Electronic Page   Notification Reason Status Update   Pt has noted firm nodule on lower left forearm (not IV related), and 2-3 softer bumps on shins over past hour. Also, hoping to d/c home tomorrow POD2.

## 2021-02-24 NOTE — ANESTHESIA POSTPROCEDURE EVALUATION
Patient: Rajan Graham    Procedure(s):   SECTION, WITH BILATERAL SALPINGECTOMY    Diagnosis:* No pre-op diagnosis entered *  Diagnosis Additional Information: No value filed.    Anesthesia Type:  Spinal    Note:  Disposition: Outpatient   Postop Pain Control: Uneventful            Sign Out: Well controlled pain   PONV:    Neuro/Psych: Uneventful            Sign Out: Acceptable/Baseline neuro status   Airway/Respiratory: Uneventful            Sign Out: Acceptable/Baseline resp. status   CV/Hemodynamics: Uneventful            Sign Out: Acceptable CV status   Other NRE:    DID A NON-ROUTINE EVENT OCCUR?          Last vitals:  Vitals:    21 2345 21 2349 21 0045   BP: 124/78  123/74   Pulse: 79  69   Resp:   14   Temp: 36.8  C (98.3  F)  36.5  C (97.7  F)   SpO2:  100% 100%       Last vitals prior to Anesthesia Care Transfer:  CRNA VITALS  20215 - 2021 2215      2021             NIBP:  (!) 140/104    Pulse:  68    NIBP Mean:  111    Ht Rate:  65    SpO2:  100 %          Electronically Signed By: Kaci Zepeda MD  2021  2:23 AM

## 2021-02-24 NOTE — ANESTHESIA PROCEDURE NOTES
Pre-Procedure   Staff -   Anesthesiologist:  Kaci Browning MD  Resident/Fellow: Serina Mitchell MD  Performed By: resident  Location: pre-op  Procedure Start/Stop Times: 2/23/2021 7:30 PM and 2/23/2021 9:40 PM  Pre-Anesthestic Checklist: patient identified, IV checked, site marked, risks and benefits discussed, informed consent, monitors and equipment checked, pre-op evaluation, at physician/surgeon's request and post-op pain management  Timeout:  Correct Patient: Yes   Correct Procedure: Yes   Correct Site: Yes   Correct Position: Yes   Correct Laterality: Yes   Site Marked: Yes    Procedure Documentation  Procedure: TAP  Diagnosis: POST OPERATIVE PAIN  Laterality: bilateral  Patient Position:supine  Patient Prep/Sterile Barriers: sterile gloves, mask, Chloraprep  Needle type: short bevel  Needle Gauge: 21.   Needle Length (millimeters) 110   Ultrasound guided, Ultrasound used to identify targeted nerve, plexus, vascular marker, or fascial plane and place a needle adjacent to it in real-time, Ultrasound was used to visualize the spread of anesthetic in close proximity to the above referenced structure  A permanent image is entered into the patient's record.    Assessment/Narrative      The placement was negative for: blood aspirated, painful injection and site bleeding  Paresthesias: No.  Bolus given via needle..   Secured via.   Insertion/Infusion Method: Single Shot  Complications: none  Injection made incrementally with aspirations every 5 mL.

## 2021-02-24 NOTE — PLAN OF CARE
Pt with stable vitals. Denies any pain, only discomfort is generalized itching, benadryl and lotion provided. Pt had low UOP overnight with inability to void. Received 500ml fluid bolus at 0900 and was able to void 550mls at 1030 upon returning to room from NICU. Awaiting 2nd void, currently in NICU with baby, ambulating without dizziness. SL removed after bolus complete and Toradol given. Independent with pumping and hygiene. Discussed showering this evening and removing incisional bandage.

## 2021-02-24 NOTE — PROGRESS NOTES
OB Postpartum Progress Note    S: Pfeiffer was removed at 2354 and able to void by ~7am. Straight cath resulted 175cc in 7 hours. Pain well controlled. Had one large emesis. Denied nausea now. Denied lightheadedness or dizziness. Has not ambulated. Denies headache, vision changes, chest pain, SOB, epigastric/RUQ pain, acute worsening in bilateral lower extremities swelling.  Whole body itching started after surgery.    O:  Vitals:    21 2349 21 0045 21 0158 21 0434   BP:  123/74 121/63 114/71   BP Location:  Left arm     Pulse:  69 68 70   Resp:  14 18    Temp:  97.7  F (36.5  C)  97.6  F (36.4  C)   TempSrc:  Oral  Oral   SpO2: 100% 100% 98% 100%   Weight:       Height:         Gen: NAD. Alert, oriented. Resting comfortably in bed.  CV: RRR  Resp: CTAB  Abd: Soft, appropriately tender, fundus firm at 2 cm below the umbilicus, appropriately tender  Incision: sterile bandage intact without much drainange  Ext: trace lower extremity edema bilaterally and symmetrical    Labs:   Hemoglobin   Date Value Ref Range Status   2021 10.6 (L) 11.7 - 15.7 g/dL Final   2021 11.7 11.7 - 15.7 g/dL Final     A/P: Rajan Graham is a 25 year old  who is POD#1 s/p RHTCS for PPROM. Pregnancy notable for FGR, abnormal GTT, decreased fetal movement at 29w1d and 33w0d, and PPROM at 35w6d. Doing well postpartum.    Postpartum/Postop  - Pain: Continue scheduled ibuprofen and tylenol. Has not yet required oxycodone.  - Heme: Hgb 11.7>  >10.6 No s/s of ongoing blood loss. Continue to monitor vitals. Repeat hgb as clinically indicated.  - Needs TDap vaccination  - GI: Scheduled senna BID, simethicone TID. Prn miralax, suppository, anti-emetics  - : low urine output. Will check Cr and give 500cc LR bolus now. Continue to monitor for urine output and evidence of urinary retention  PT declines creatinine lab this AM. Discussed importance of assessing kidney fct.  Will assess bladder function  after fluid bolus by 10 AM. If continued unable to void and/or low urine outpt, pt agrees to lab creatinine.   - PNC: Rh positive, Rubella immune  - Contraception: S/p BTL.   - PPx: Encourage ambulation, IS, SCDs while confined to bed    Failed GCT, no GTT  - Fasting blood glucose pending.    Elevated BP x1  - BPs have been normal since. No preE symptoms. Continue to monitor for BP    Itching  - Benadryl and nabuine are ordered    Anticipate discharge POD#2-3; once meeting postpartum discharge goals     Anya Joyner MD ([email or web address])  N OBGYN PGY-3  02/24/2021     [facility] staff:  Appreciate note by Dr. Joyner.  I have seen and examined the patient without the resident. I have reviewed, edited, and agree with the note.        Rocio Person MD, FACOG  2/24/2021  9:16 AM

## 2021-02-24 NOTE — PROVIDER NOTIFICATION
02/24/21 0829   Provider Notification   Provider Name/Title G2   Method of Notification Electronic Page   Notification Reason Other   Refused creatinine lab draw. Insists on going out to smoke now, declines patch/gum

## 2021-02-24 NOTE — DISCHARGE SUMMARY
[facility]   Discharge Summary    Rajan Graham MRN# [id number]   Age: 25 year old YOB: 1995     Date of Admission:  2021  Date of Discharge::  21   Admitting Physician:  Shaneka Valenzuela MD  Discharge Physician:  Kiara Mccoy MD          Admission Diagnoses:   - Intrauterine pregnancy at 36w0d  - PPROM at 35w6d  - FGR (EFW, AC <1%ile)  - Hx CS x2  - Elevated GCT, with no GTT  - Desiring sterilization          Discharge Diagnosis:     Same, delivered   - S/p below procedures  - Gestational hypertension          Procedures:     Procedure(s): Repeat high transverse  section with double layer closure via Pfannenstiel skin incision  Bilateral tubal ligation  Spinal anesthesia  TAP block            Medications Prior to Admission:     Medications Prior to Admission   Medication Sig Dispense Refill Last Dose     albuterol (PROAIR HFA, PROVENTIL HFA, VENTOLIN HFA) 108 (90 BASE) MCG/ACT inhaler Inhale 2 puffs into the lungs every 6 hours 18 g 2 Past Month at Unknown time     ondansetron (ZOFRAN-ODT) 4 MG ODT tab Take 4 mg by mouth every 8 hours as needed for nausea   2021 at Unknown time     blood glucose (NO BRAND SPECIFIED) test strip Use to test blood sugar 4 times daily or as directed. 100 strip 1      blood glucose monitoring (ACCU-CHEK EDMOND PLUS) meter device kit Use to test blood sugar 4 times daily or as directed. 1 kit 0      blood glucose monitoring (ACCU-CHEK MULTICLIX) lancets Test 4 times daily. 100 each 4      Misc. Devices (BREAST PUMP) MISC 1 each daily 1 each 0      Misc. Devices (BREAST PUMP) MISC 1 each daily as needed (lactation) 1 each 0      Prenatal Vit-Fe Fumarate-FA (PRENATAL PLUS) 27-1 MG TABS Take 1 tablet by mouth as needed        [DISCONTINUED] acetaminophen (TYLENOL) 500 MG tablet Take 1-2 tablets (500-1,000 mg) by mouth every 6 hours as needed for mild pain 30 tablet 0 Past Month at Unknown time     [DISCONTINUED]  aspirin (ASA) 81 MG chewable tablet Take 81 mg by mouth daily   2021 at Unknown time     [DISCONTINUED] docusate sodium (COLACE) 50 MG capsule Take 1 capsule (50 mg) by mouth 2 times daily as needed for constipation (Patient not taking: Reported on 2021) 60 capsule 1      [DISCONTINUED] ibuprofen (ADVIL/MOTRIN) 600 MG tablet Take 1 tablet (600 mg) by mouth every 6 hours as needed for moderate pain Start after delivery (Patient not taking: Reported on 2021) 60 tablet 0      [DISCONTINUED] senna-docusate (SENOKOT-S/PERICOLACE) 8.6-50 MG tablet Take 1 tablet by mouth daily Start after delivery. (Patient not taking: Reported on 2021) 100 tablet 0           Discharge Medications:        Review of your medicines      START taking      Dose / Directions   oxyCODONE 5 MG tablet  Commonly known as: ROXICODONE  Used for: S/P  section      Dose: 5 mg  Take 1 tablet (5 mg) by mouth every 6 hours as needed for pain  Quantity: 6 tablet  Refills: 0        CONTINUE these medicines which may have CHANGED, or have new prescriptions. If we are uncertain of the size of tablets/capsules you have at home, strength may be listed as something that might have changed.      Dose / Directions   acetaminophen 325 MG tablet  Commonly known as: TYLENOL  This may have changed:     medication strength    how much to take    additional instructions  Used for: S/P  section      Dose: 650 mg  Take 2 tablets (650 mg) by mouth every 6 hours as needed for mild pain Start after Delivery.  Quantity: 100 tablet  Refills: 0        CONTINUE these medicines which have NOT CHANGED      Dose / Directions   albuterol 108 (90 Base) MCG/ACT inhaler  Commonly known as: PROAIR HFA/PROVENTIL HFA/VENTOLIN HFA  Used for: Asthma with allergic rhinitis      Dose: 2 puff  Inhale 2 puffs into the lungs every 6 hours  Quantity: 18 g  Refills: 2     blood glucose monitoring lancets  Used for: Supervision of high risk pregnancy in third  trimester, Abnormal glucose tolerance test during pregnancy, antepartum      Test 4 times daily.  Quantity: 100 each  Refills: 4     blood glucose monitoring meter device kit  Used for: Supervision of high risk pregnancy in third trimester, Abnormal glucose tolerance test during pregnancy, antepartum      Use to test blood sugar 4 times daily or as directed.  Quantity: 1 kit  Refills: 0     blood glucose test strip  Commonly known as: NO BRAND SPECIFIED  Used for: Supervision of high risk pregnancy in third trimester, Abnormal glucose tolerance test during pregnancy, antepartum      Use to test blood sugar 4 times daily or as directed.  Quantity: 100 strip  Refills: 1     * breast pump Misc  Used for: Supervision of high risk pregnancy in third trimester      Dose: 1 each  1 each daily as needed (lactation)  Quantity: 1 each  Refills: 0     * breast pump Misc  Used for: Lactation disorder      Dose: 1 each  1 each daily  Quantity: 1 each  Refills: 0     ibuprofen 600 MG tablet  Commonly known as: ADVIL/MOTRIN  Used for: S/P  section      Dose: 600 mg  Take 1 tablet (600 mg) by mouth every 6 hours as needed for moderate pain Start after delivery  Quantity: 60 tablet  Refills: 0     ondansetron 4 MG ODT tab  Commonly known as: ZOFRAN-ODT      Dose: 4 mg  Take 4 mg by mouth every 8 hours as needed for nausea  Refills: 0     Prenatal Plus 27-1 MG Tabs      Dose: 1 tablet  Take 1 tablet by mouth as needed  Refills: 0     senna-docusate 8.6-50 MG tablet  Commonly known as: SENOKOT-S/PERICOLACE  Used for: S/P  section      Dose: 1 tablet  Take 1 tablet by mouth daily Start after delivery.  Quantity: 100 tablet  Refills: 0         * This list has 2 medication(s) that are the same as other medications prescribed for you. Read the directions carefully, and ask your doctor or other care provider to review them with you.            STOP taking    aspirin 81 MG chewable tablet  Commonly known as: ASA         docusate sodium 50 MG capsule  Commonly known as: COLACE              Where to get your medicines      These medications were sent to [facility] [place], [place] - [address]  [address], [facility] [address]    Phone: [phone]     acetaminophen 325 MG tablet    ibuprofen 600 MG tablet    senna-docusate 8.6-50 MG tablet     Some of these will need a paper prescription and others can be bought over the counter. Ask your nurse if you have questions.    Bring a paper prescription for each of these medications    oxyCODONE 5 MG tablet             Consultations:   Anesthesia          Brief Admission History:   Rajan Graham is a 25 year old  at 36w0d by 6w5d US admitted for PPROM, now desiring repeat CS.      Currently admitted to CN service, undergoing augmentation of labor following ROM yesterday evening at 10pm. She has not made much progress on pitocin, currently 2cm dilated. She is very uncomfortable with contractions with pain in her left leg, and also desiring tubal ligation after this delivery, therefore she would like to proceed with  section.      Notes that her baby is measuring small this pregnancy, but her other babies were also small around 4lbs. They were not admitted to NICU, and discharged home with her.       She did eat breakfast this morning at 8am. Denies other compl       Intraoperative course   The procedure was uncomplicated.   mL.  See operative report for details.     Operative findings:   - Viable female infant delivered at 2237 on 2021 with APGARs 7 and 8. Weight 1880g.   - Cord gasses: arterial pH 7.22, base deficit 5.6  - Clear amniotic fluid, Placenta anterior, intact.   - Normal uterus, tubes, and ovaries.   - Moderate rectofasical adhesions, filmy adhesions of bladder peritoneum to the lower uterine segment, apron of omentum draped over uterus, displaced with adhesions taken down with blunt dissection  -  Surgical sites noted to be hemostatic at the end of case.        Postpartum Course   The patient's hospital course was unremarkable.  She recovered as anticipated and experienced no post-operative complications. On discharge, her pain was well controlled. Vaginal bleeding is similar to peak menstrual flow.  Voiding without difficulty.  Ambulating well, tolerating a normal diet, and has resumption of bowel function.  No fever or significant wound drainage.  Breastfeeding well.  Infant is stable. She was discharged on post-partum day #2.    Post-partum hemoglobin:   Hemoglobin   Date Value Ref Range Status   02/24/2021 10.6 (L) 11.7 - 15.7 g/dL Final     Contraception: s/p tubal ligation  Rh positive, no Rhogam indicated  Rubella immune, no MMR indicated          Discharge Instructions and Follow-Up:     Discharge diet: Regular   Discharge activity: No lifting greater than 20 lbs, pushing, pulling, or other strenuous activity for 6 weeks. Pelvic rest for 6 weeks including no sexual intercourse, tampons, or douching. No driving until you can slam on the breaks without pain or while on narcotic pain medications.    Discharge follow-up: Follow up with primary OB for routine postpartum visit in 1 week and 6 weeks   Wound care: Keep incision clean and dry           Discharge Disposition:     Discharged to home in good condition.      Anya Joyner MD ([email or web address])  N OBGYN PGY-3  02/25/2021       Appreciate note by Dr. Joyner. Patient has been seen and examined by me separate from the resident, agree with above note.     Kiara Mccoy MD  11:51 AM

## 2021-02-24 NOTE — PROGRESS NOTES
Pt transferred via cart from NICU and arrived to [facility] rm . SBAR to JUAREZ Martin RN. Pt moved from cart to bed via hovermat with 3 staff assist. LR is infusing and Pitocin at 100 mL/hr, verified with Mario at bedside. All personal belongings with patient. Fundus is firm/1below/scant lochia, rubra. All dirty linens removed from under patient and pericare provided.

## 2021-02-24 NOTE — PLAN OF CARE
VSS. Postpartum assessment WDL. Pfeiffer out - unable to void after 2 separate attempts. Straight cathed for 175mL. Denies signs and symptoms of preeclampsia. Had a 400cc of emesis and PRN Zofran administered. Recommended taking sips of water until can tolerate large volumes by PO. Pumping for infant in NICU. Asked several times to be allowed to got out to smoke. Explained that RN is unable to fulfill the request because she was infusing pitocin and still wheelchair bound -would be complicated transporting the pt and pump outside. Suggested a nicotine patch which the pt declined. Significant other present, supportive with cares. Continue plan of care.

## 2021-02-24 NOTE — PROVIDER NOTIFICATION
02/24/21 0900   Provider Notification   Provider Name/Title Dr Person   Method of Notification At Bedside   Notification Reason Status Update   Dr Person rounding. Discussed plan for attempting to void at 1000 (straight cath at 0645), LR bolus infusing now. If unable to void, will straight cath again and draw creatinine level (pt refused draw earlier). Taking w/c ride to NICU now.

## 2021-02-24 NOTE — PLAN OF CARE
Plan was initially for  to be at 1630, but was moved back due to other cases taking priority. Pt brought back to the OR at . Baby girl born at , NICU present at delivery. QBL 208ml. IV Toradol administered at . TAP block completed. Pt transferred to PACU at . VSS. Fundus midline/firm/U1 with scant bleeding. Incision clean, dry, intact. Incisional pain relieved with IV fentanyl per orders. Itching relived with IV nubain per orders. Pumped in PACU, colostrum sent to NICU. Plan to remove gordon in PACU. Report given to Teri MALDONADO at bedside.

## 2021-02-24 NOTE — CONSULTS
[facility]  MATERNAL CHILD HEALTH   INITIAL NICU PSYCHOSOCIAL ASSESSMENT     DATA:     Presenting Information: Mom, Rajan is 25 yrs old  who delivered a female infant [place] on 21 at 36w gestation in the setting of . Baby was admitted to the NICU for monitoring and management of prematurity, RDS and possible sepsis.  SW was consulted to meet with this family per NICU admission of infant.    Living Situation: Parents are Rajan and Dominic Alejandre. They are engaged, currently unmarried. Rajan lives with her older children from previous relationships ages 9,5, 1.5 yrs in [place] near [facility]. Dominic resides with his mom in [place].     This is her first NICU experience and likelihood of having baby require a longer admission that she does.     Social Support: Extended friends and family support.     Education/Employment: Rajan does not work outside the home.     Insurance: [employer]    Source of Financial Support: Rajan receives subsidized housing and [facility] assistance.  Rajan is connected with [employer].    Mental Health History: No history or current concerns reported.     History of Postpartum Mood Disorders: None.     INTERVENTION:       Chart review    Conducted Psychosocial Assessment    Introduction to Maternal Child Health SW role and scope of practice    Orientation to the NICU (parking, lodging, meals, visitation)    Validated emotions and provided supportive listening    Provided psychoeducation on  mood disorders and indicated that SW would continue to monitor mood and support bridging to mental health resources as needed.    Provided SW contact info    Provided H & P for mom to provide to the [facility] to add pt to her case.     Provided week parking pass (#[phone]) to mom's email address: [email or web address]    ASSESSMENT:     Coping: Mom appears to be coping and managing adequately. She identifies having shalom in God and trusting in his  plan. She feels well supported by her fiance, Dominic, and her mom and other family members. She appears dedicated to pumping.   Rajan readily engages with SW although was also focusing on pumping during SW visit.     Assessment of parental risk for PMAD: Higher than average risk, considering unanticipated NICU admission. Protective factors include no previous mental health issues or noted coping concerns.     Resiliency Factors & Strengths: Rajan seems to be confident, motivated in caring for herself and her children. She seems to have a strong support system and shalom in God.     Rajan does not identify any barriers; stating she has no barriers relating to transportation.       PLAN:     SW will continue to follow for supportive intervention.    Kjerstin Rydeen, [facility]   Social Worker  Maternal Child Health   Direct: [phone]  Pager: [phone]

## 2021-02-24 NOTE — ANESTHESIA PROCEDURE NOTES
Spinal/LP Procedure Note  Spinal Block    Staff -   Anesthesiologist:  Kaci Browning MD  Resident/Fellow: Serina iMtchell MD  Performed By: resident  Procedure performed by resident/CRNA in presence of a teaching physician.    Location: OB  Procedure Start/Stop Times:      2/23/2021 8:06 PM     2/23/2021 8:10 PM    patient identified, IV checked, site marked, risks and benefits discussed, informed consent, monitors and equipment checked, pre-op evaluation, at physician/surgeon's request and post-op pain management      Correct Patient: Yes      Correct Position: Yes      Correct Site: Yes      Correct Procedure: Yes      Correct Laterality:  Yes and N/A    Site Marked:  Yes and N/A  Procedure:     Procedure:  Intrathecal    ASA:  2    Position:  Sitting    Sterile Prep: Betadine      Insertion site:  L4-5    Approach:  Midline    Needle Type:  Yoanna    Needle gauge (G):  25    Local Skin Infiltration:  1% lidocaine    amount (ml):  3    Needle Length (in):  3.5    Introducer used: Yes      Introducer gauge:  20 G    Attempts:  1    Redirects:  1    CSF:  Clear    Paresthesias:  No

## 2021-02-25 ENCOUNTER — TELEPHONE (OUTPATIENT)
Dept: OBGYN | Facility: CLINIC | Age: 26
End: 2021-02-25

## 2021-02-25 VITALS
WEIGHT: 178 LBS | SYSTOLIC BLOOD PRESSURE: 138 MMHG | OXYGEN SATURATION: 100 % | DIASTOLIC BLOOD PRESSURE: 87 MMHG | HEIGHT: 67 IN | HEART RATE: 87 BPM | BODY MASS INDEX: 27.94 KG/M2 | TEMPERATURE: 97.1 F | RESPIRATION RATE: 16 BRPM

## 2021-02-25 LAB — GLUCOSE BLDC GLUCOMTR-MCNC: 82 MG/DL (ref 70–99)

## 2021-02-25 PROCEDURE — 999N001017 HC STATISTIC GLUCOSE BY METER IP

## 2021-02-25 PROCEDURE — 250N000013 HC RX MED GY IP 250 OP 250 PS 637: Performed by: STUDENT IN AN ORGANIZED HEALTH CARE EDUCATION/TRAINING PROGRAM

## 2021-02-25 RX ORDER — OXYCODONE HYDROCHLORIDE 5 MG/1
5 TABLET ORAL EVERY 6 HOURS PRN
Qty: 6 TABLET | Refills: 0 | Status: SHIPPED | OUTPATIENT
Start: 2021-02-25 | End: 2021-11-29

## 2021-02-25 RX ORDER — AMOXICILLIN 250 MG
1 CAPSULE ORAL DAILY
Qty: 100 TABLET | Refills: 0 | Status: SHIPPED | OUTPATIENT
Start: 2021-02-25 | End: 2021-11-29

## 2021-02-25 RX ORDER — IBUPROFEN 600 MG/1
600 TABLET, FILM COATED ORAL EVERY 6 HOURS PRN
Qty: 60 TABLET | Refills: 0 | Status: SHIPPED | OUTPATIENT
Start: 2021-02-25

## 2021-02-25 RX ORDER — ACETAMINOPHEN 325 MG/1
650 TABLET ORAL EVERY 6 HOURS PRN
Qty: 100 TABLET | Refills: 0 | Status: SHIPPED | OUTPATIENT
Start: 2021-02-25

## 2021-02-25 RX ADMIN — ACETAMINOPHEN 975 MG: 325 TABLET, FILM COATED ORAL at 03:50

## 2021-02-25 RX ADMIN — IBUPROFEN 800 MG: 800 TABLET, FILM COATED ORAL at 06:13

## 2021-02-25 RX ADMIN — IBUPROFEN 800 MG: 800 TABLET, FILM COATED ORAL at 00:25

## 2021-02-25 RX ADMIN — ACETAMINOPHEN 975 MG: 325 TABLET, FILM COATED ORAL at 11:35

## 2021-02-25 RX ADMIN — OXYCODONE HYDROCHLORIDE 5 MG: 5 TABLET ORAL at 03:50

## 2021-02-25 RX ADMIN — OXYCODONE HYDROCHLORIDE 5 MG: 5 TABLET ORAL at 08:37

## 2021-02-25 RX ADMIN — DOCUSATE SODIUM AND SENNOSIDES 1 TABLET: 8.6; 5 TABLET ORAL at 08:36

## 2021-02-25 NOTE — PROGRESS NOTES
OB Postpartum Progress Note    S: Would like to discharge today. She states her pain is well controlled. Lochia minimal. Voiding spontaneously. Passing flatus, no BM yet. Tolerating PO. Denies nausea or vomiting. Ambulating without dizziness. No HA, vision changes, chest pain, shortness of breath, or RUQ pain. She is breastfeeding and is s/p BTL for contraception.    O:  Vitals:    21 2120 21 0354 21 0830 21 0836   BP: 119/78 123/72 (!) 137/90 138/87   BP Location:       Pulse: 90 80 87    Resp: 16 16 16    Temp: 97.3  F (36.3  C) 97.3  F (36.3  C) 97.1  F (36.2  C)    TempSrc: Oral Oral Oral    SpO2:       Weight:       Height:         Gen: NAD. Alert, oriented. Resting comfortably in bed.  CV: Regular rate, well perfused  Resp: Breathing comfortably on room air  Abd: Soft, appropriately tender, fundus firm at 2 cm below the umbilicus, appropriately tender  Incision: sterile bandage intact without much drainange  Ext: trace lower extremity edema bilaterally and symmetrical; 2+ DTR, no clonus    Labs:   Hemoglobin   Date Value Ref Range Status   2021 10.6 (L) 11.7 - 15.7 g/dL Final   2021 11.7 11.7 - 15.7 g/dL Final     A/P: Rajan Graham is a 25 year old  who is POD#2 s/p RHTCS for PPROM. Pregnancy notable for FGR, abnormal GTT, decreased fetal movement at 29w1d and 33w0d, and PPROM at 35w6d. Doing well postpartum.    Postpartum/Postop  - Pain: Continue scheduled ibuprofen and tylenol. Has not yet required oxycodone.  - Heme: Hgb 11.7>  >10.6 No s/s of ongoing blood loss. Continue to monitor vitals. Repeat hgb as clinically indicated.  - Needs TDap vaccination  - GI: Scheduled senna BID, simethicone TID. Prn miralax, suppository, anti-emetics  - : low urine output POD#1- resolved. Voiding spontaneously.  - PNC: Rh positive, Rubella immune  - Contraception: S/p BTL.   - PPx: Encourage ambulation, IS, SCDs while confined to bed    Failed GCT, no GTT  - Fasting  blood glucose was normal. Patient is at higher risk for T2DM.   - Don't need 6wk 2hr GTT because fasting BG is normal today, but can consider yearly hgbA1C.    Gestational Hypertension  - Asymptomatic  - Refused HELLP labs this AM. Will discharge with blood pressure cuff. Preeclampsia warning signs and symptoms discussed with patient.    Itching, resolved  - Benadryl and nabuine are ordered    Anticipate discharge POD#2; once meeting postpartum discharge goals     Anya Joyner MD ([email or web address])  N OBGYN PGY-3  02/25/2021    Appreciate note by Dr. Joyner. Patient has been seen and examined by me separate from the resident, agree with above note. Ready for discharge today. Will follow up with BP and incision check in 1 week.     Kiara Mccoy MD  11:49 AM

## 2021-02-25 NOTE — PROVIDER NOTIFICATION
02/25/21 1141   Provider Notification   Provider Name/Title Kjersten Rydeen, SW   Method of Notification Electronic Page   Request Evaluate in Person   Notification Reason Other     Patient wants to discharge asap but needs parking pass

## 2021-02-25 NOTE — LACTATION NOTE
"This note was copied from a baby's chart.  D:  Rajan states she is normally in good health, takes no medications, and has no history of breast/chest surgery or trauma.  Her medical record indicates a history of THC use and asthma. This baby is her 4th child; she  her last child for 1 year. She has already started to pump.   I:  I gave her a folder of introductory materials, reviewed physiology of colostrum and milk production, pumping guidelines, and I gave her a log and encouraged her to use it.  I explained how to access the videos \"Hand Expression\" and \"Maximizing Milk Production\"; as well as other helpful books and websites.  We discussed hands-on pumping techniques and usefulness of a hands-free pumping bra.  We discussed skin to skin holding and how to reach breastfeeding goals.  We talked about medications during breastfeeding.  She verbalized understanding. She has pump coverage through her insurance company; I dispensed a Spectra per her request.  A:  Mom has information she needs to initiate her supply.   P:  Will continue to provide lactation support.  Bijal James, RNC, IBCLC         Addendum:   D: Infant tox screen pending. Per provider request, I gave mother the system  handout on \"Marijuana and Pregnancy\" which also discusses lactation.     "

## 2021-02-25 NOTE — PLAN OF CARE
VSS, pain well controlled with schedule and PRN meds. Pumping and down to visit baby in NICU often and independently. Planning to discharge later today. Incision still covered with island dressing, no drainage.

## 2021-02-25 NOTE — PROGRESS NOTES
Patient is laying comfortably in bed. Says she has been getting some sleep. States her pain is well managed. Has been mobilizing out of bed without any weakness, dizziness, or headaches. Is tolerating a PO diet without any nausea or vomiting. Has been able to urinate on her own however still has not passed a bowel movement, is passing gas though. States she has some mild pruritis that is improved since yesterday and is tolerable. Overall she is progressing well, no interventions needed at this time.     Aaron Moreno MD  Anesthesiology CA-1

## 2021-02-25 NOTE — PLAN OF CARE
7009-1330:  VSS, except for BP elevated (though not severe range) and postpartum assessments WDL.  Up ad kong with steady gait and walking outside to smoke.  Independent with cares.  Bonding well with infant.  Pumping independently for infant in NICU.  Pt denies pain but c/o generalized itching that is very bothersome, slightly relieved with a dose of oxycodone.  Offered additional anti-itch medications but pt doesn't want to try any other medications that she hasn't had before.  Pt has also tried wet washcloth, lotion and vaseline.  Incisional bandage clean, dry and intact, planning to shower and remove dressing this evening after 24 hours.  Labs (fasting blood sugar and HELLP labs) ordered for tomorrow morning at 0600.  Will continue with postpartum cares and education per plan of care.

## 2021-02-25 NOTE — PROGRESS NOTES
CNM visit. Pt ready for discharge. Happy w care,  doing well in NICU. Plans pp visits at [facility] with CNMs. Knows resources and when to call CNM or MD with concerns.  Amber Brown, DNP, APRN, CNM, FACNM

## 2021-02-25 NOTE — PLAN OF CARE
VSS. Afebrile. Up ad kong. Voiding and passing gas. C/o some pain and medicated with relief. Pumping. No PIV in place. Visiting baby in NICU and going out to smoke. Wants parking passes and SW paged but pt did not want to wait as she was needing to leave at the time. Informed her to touch base with SW when she is in NICU. Lactation saw and gave her a pump. Discharge instructions and meds reviewed and given to pt. Discharged today with FOB.

## 2021-02-25 NOTE — TELEPHONE ENCOUNTER
Message received from Dr. Mccoy requesting patient be scheduled next week for BP and incision check.    Called and spoke with pt, scheduled BP and incision check 3/4 at 1430. Patient confirmed availability. Encouraged her to call clinic with any questions or concerns.

## 2021-03-05 LAB — COPATH REPORT: NORMAL

## 2021-09-04 ENCOUNTER — HEALTH MAINTENANCE LETTER (OUTPATIENT)
Age: 26
End: 2021-09-04

## 2021-11-29 ENCOUNTER — HOSPITAL ENCOUNTER (EMERGENCY)
Facility: CLINIC | Age: 26
Discharge: HOME | End: 2021-11-30
Attending: FAMILY MEDICINE | Admitting: FAMILY MEDICINE
Payer: COMMERCIAL

## 2021-11-29 DIAGNOSIS — H10.33 ACUTE CONJUNCTIVITIS OF BOTH EYES, UNSPECIFIED ACUTE CONJUNCTIVITIS TYPE: ICD-10-CM

## 2021-11-29 PROCEDURE — 99283 EMERGENCY DEPT VISIT LOW MDM: CPT | Performed by: FAMILY MEDICINE

## 2021-11-29 ASSESSMENT — VISUAL ACUITY
OS: 20/30;WITHOUT CORRECTIVE LENSES
OD: 20/70;WITHOUT CORRECTIVE LENSES

## 2021-11-30 VITALS
RESPIRATION RATE: 16 BRPM | BODY MASS INDEX: 28.35 KG/M2 | SYSTOLIC BLOOD PRESSURE: 105 MMHG | HEART RATE: 92 BPM | OXYGEN SATURATION: 100 % | WEIGHT: 181 LBS | TEMPERATURE: 99.2 F | DIASTOLIC BLOOD PRESSURE: 67 MMHG

## 2021-11-30 PROCEDURE — 250N000013 HC RX MED GY IP 250 OP 250 PS 637: Performed by: FAMILY MEDICINE

## 2021-11-30 RX ORDER — POLYMYXIN B SULFATE AND TRIMETHOPRIM 1; 10000 MG/ML; [USP'U]/ML
2 SOLUTION OPHTHALMIC EVERY 6 HOURS
Status: DISCONTINUED | OUTPATIENT
Start: 2021-11-30 | End: 2021-11-30 | Stop reason: HOSPADM

## 2021-11-30 RX ADMIN — POLYMYXIN B SULFATE AND TRIMETHOPRIM SULFATE 2 DROP: 10000; 1 SOLUTION/ DROPS OPHTHALMIC at 00:26
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
## 2021-11-30 NOTE — ED TRIAGE NOTES
Pt. states having greenish drainage from both eyes.  Both eyes hurting but right eye pain worse.   No visual disturbances.

## 2022-02-19 ENCOUNTER — HEALTH MAINTENANCE LETTER (OUTPATIENT)
Age: 27
End: 2022-02-19

## 2022-02-26 ENCOUNTER — HOSPITAL ENCOUNTER (EMERGENCY)
Facility: CLINIC | Age: 27
Discharge: HOME | End: 2022-02-26
Attending: EMERGENCY MEDICINE | Admitting: EMERGENCY MEDICINE
Payer: COMMERCIAL

## 2022-02-26 VITALS
TEMPERATURE: 99.1 F | HEART RATE: 77 BPM | WEIGHT: 181.2 LBS | DIASTOLIC BLOOD PRESSURE: 82 MMHG | RESPIRATION RATE: 16 BRPM | BODY MASS INDEX: 28.38 KG/M2 | SYSTOLIC BLOOD PRESSURE: 130 MMHG | OXYGEN SATURATION: 99 %

## 2022-02-26 DIAGNOSIS — L72.9 SUBCUTANEOUS CYST: ICD-10-CM

## 2022-02-26 PROCEDURE — 99282 EMERGENCY DEPT VISIT SF MDM: CPT | Performed by: EMERGENCY MEDICINE

## 2022-02-26 ASSESSMENT — ENCOUNTER SYMPTOMS
FEVER: 0
HEADACHES: 0
CHEST TIGHTNESS: 0
ABDOMINAL PAIN: 0
NUMBNESS: 1
SORE THROAT: 0

## 2022-02-26 NOTE — LETTER
February 26, 2022      To Whom It May Concern:      Rajan Graham was seen in our Emergency Department today, 02/26/22.  I expect her condition to improve over the next day.  She may return to work/school when improved.    Sincerely,        Riccardo Borden MD

## 2022-02-26 NOTE — DISCHARGE INSTRUCTIONS
Please make an appointment to follow up with Dermatology Clinic (phone: [phone]) or your own Dermatologist as soon as possible for evaluation and biopsy if necessary.

## 2022-02-26 NOTE — ED PROVIDER NOTES
"ED Provider Note  [facility]      History     Chief Complaint   Patient presents with     Derm Problem     \"I may have a growth/cyst on my arm\"; raised area on left forearm; no drainage, mobile; first noticed it on Tuesday; denies pain, but arm goes numb sometimes from cyst area down to hand; denies fever or chills     HPI  Rajan Graham is a 26 year old female who comes in with a mobile mass on her right forearm. She first noticed the mass four days ago (2/22/22). Since noticing the mass, she has  Also been having intermittent numbness in her whole right hand when she does not move her hand. Numbness always resolves after moving her hand. No pain. No warmth or redness at the site of the mass. No discharge or drainage. No history of IV drug use or laceration to site.     Family history in mother with a cyst on her heart. No personal history of cysts or skin masses. No fever, chills, chest pain, shortness of breath, abdominal pain, nausea, vomiting, changes in bowel or bladder, or any other infectious symptoms.      Review of Systems   Constitutional: Negative for fever.   HENT: Negative for sore throat.    Respiratory: Negative for chest tightness.    Cardiovascular: Negative for chest pain.   Gastrointestinal: Negative for abdominal pain.   Genitourinary: Negative.    Skin:        Well-circumscribed mass right medial forarm   Neurological: Positive for numbness. Negative for headaches.   All other systems reviewed and are negative.    A complete review of systems was performed with pertinent positives and negatives noted in the HPI, and all other systems negative.    Physical Exam   BP: 130/82  Pulse: 77  Temp: 99.1  F (37.3  C)  Resp: 16  Weight: 82.2 kg (181 lb 3.2 oz)  SpO2: 99 %  Physical Exam  Vitals and nursing note reviewed.   Constitutional:       Appearance: Normal appearance.   HENT:      Head: Normocephalic and atraumatic.      Nose: Nose normal.   Eyes:      Extraocular " Movements: Extraocular movements intact.      Conjunctiva/sclera: Conjunctivae normal.   Cardiovascular:      Rate and Rhythm: Normal rate.      Pulses: Normal pulses.   Pulmonary:      Effort: Pulmonary effort is normal.   Musculoskeletal:      Left forearm: Normal.        Arms:       Comments: 1 by 1 inch well-circumscribed mobile mass on the middle of the right forearm   Skin:     General: Skin is warm.   Neurological:      General: No focal deficit present.      Mental Status: She is alert.      Motor: No weakness.       ED Course       1:00 PM Medical student saw and assessed the patient.   1:21 PM Dr. Borden saw and assessed the patient.     Procedures                   No results found for any visits on 02/26/22.  Medications - No data to display     Assessments & Plan (with Medical Decision Making)   Rajan Graham is a 26 year old female who comes in with a mobile mass on her right forearm.  Differential diagnosis includes: ganglion cyst, myxoid cyst, lipoma, angiolipoma, and others. Exam confirmed the mass was mobile and not concerning for infection with no erythema or warmth. She will follow up in clinic with dermatology for a possible biopsy.     This data collected with the medical student working in the Emergency Department.  I repeated the history and physical exam with the patient.  The key portions of the note including the entire assessment and plan was written by me.    I have reviewed the nursing notes. I have reviewed the findings, diagnosis, plan and need for follow up with the patient.    New Prescriptions    No medications on file       Final diagnoses:   None     Rocio Manzo, MS4  --  Riccardo Borden MD  [facility] EMERGENCY DEPARTMENT  2/26/2022     Riccardo Borden MD  02/27/22 1054

## 2022-10-22 ENCOUNTER — HEALTH MAINTENANCE LETTER (OUTPATIENT)
Age: 27
End: 2022-10-22

## 2023-03-14 ENCOUNTER — LAB REQUISITION (OUTPATIENT)
Dept: LAB | Facility: CLINIC | Age: 28
End: 2023-03-14
Payer: COMMERCIAL

## 2023-03-14 DIAGNOSIS — Z20.2 CONTACT WITH AND (SUSPECTED) EXPOSURE TO INFECTIONS WITH A PREDOMINANTLY SEXUAL MODE OF TRANSMISSION: ICD-10-CM

## 2023-03-14 PROCEDURE — 87389 HIV-1 AG W/HIV-1&-2 AB AG IA: CPT | Mod: ORL | Performed by: PEDIATRICS

## 2023-03-14 PROCEDURE — 87591 N.GONORRHOEAE DNA AMP PROB: CPT | Mod: ORL | Performed by: PEDIATRICS

## 2023-03-14 PROCEDURE — 86780 TREPONEMA PALLIDUM: CPT | Mod: ORL | Performed by: PEDIATRICS

## 2023-03-14 PROCEDURE — 87491 CHLMYD TRACH DNA AMP PROBE: CPT | Mod: ORL | Performed by: PEDIATRICS

## 2023-03-15 LAB
C TRACH DNA SPEC QL NAA+PROBE: NEGATIVE
HIV 1+2 AB+HIV1 P24 AG SERPL QL IA: NONREACTIVE
T PALLIDUM AB SER QL: NONREACTIVE

## 2023-03-16 LAB — N GONORRHOEA DNA SPEC QL NAA+PROBE: ABNORMAL

## 2023-04-01 ENCOUNTER — HEALTH MAINTENANCE LETTER (OUTPATIENT)
Age: 28
End: 2023-04-01

## 2023-10-30 ENCOUNTER — HOSPITAL ENCOUNTER (EMERGENCY)
Facility: CLINIC | Age: 28
Discharge: HOME | End: 2023-10-30
Attending: EMERGENCY MEDICINE | Admitting: EMERGENCY MEDICINE
Payer: COMMERCIAL

## 2023-10-30 VITALS
BODY MASS INDEX: 27.69 KG/M2 | OXYGEN SATURATION: 100 % | HEART RATE: 97 BPM | DIASTOLIC BLOOD PRESSURE: 83 MMHG | TEMPERATURE: 98.3 F | WEIGHT: 176.8 LBS | SYSTOLIC BLOOD PRESSURE: 131 MMHG | RESPIRATION RATE: 16 BRPM

## 2023-10-30 DIAGNOSIS — R10.32 LEFT LOWER QUADRANT ABDOMINAL PAIN: ICD-10-CM

## 2023-10-30 LAB
ALBUMIN SERPL BCG-MCNC: 4.5 G/DL (ref 3.5–5.2)
ALBUMIN UR-MCNC: NEGATIVE MG/DL
ALP SERPL-CCNC: 104 U/L (ref 35–104)
ALT SERPL W P-5'-P-CCNC: 14 U/L (ref 0–50)
ANION GAP SERPL CALCULATED.3IONS-SCNC: 12 MMOL/L (ref 7–15)
APPEARANCE UR: CLEAR
AST SERPL W P-5'-P-CCNC: 17 U/L (ref 0–45)
BACTERIA #/AREA URNS HPF: ABNORMAL /HPF
BASOPHILS # BLD AUTO: 0.1 10E3/UL (ref 0–0.2)
BASOPHILS NFR BLD AUTO: 1 %
BILIRUB SERPL-MCNC: 0.3 MG/DL
BILIRUB UR QL STRIP: NEGATIVE
BUN SERPL-MCNC: 11.7 MG/DL (ref 6–20)
C TRACH DNA SPEC QL NAA+PROBE: NEGATIVE
CALCIUM SERPL-MCNC: 9.4 MG/DL (ref 8.6–10)
CHLORIDE SERPL-SCNC: 106 MMOL/L (ref 98–107)
COLOR UR AUTO: ABNORMAL
CREAT SERPL-MCNC: 0.95 MG/DL (ref 0.51–0.95)
DEPRECATED HCO3 PLAS-SCNC: 23 MMOL/L (ref 22–29)
EGFRCR SERPLBLD CKD-EPI 2021: 83 ML/MIN/1.73M2
EOSINOPHIL # BLD AUTO: 0 10E3/UL (ref 0–0.7)
EOSINOPHIL NFR BLD AUTO: 0 %
ERYTHROCYTE [DISTWIDTH] IN BLOOD BY AUTOMATED COUNT: 12.3 % (ref 10–15)
GLUCOSE SERPL-MCNC: 86 MG/DL (ref 70–99)
GLUCOSE UR STRIP-MCNC: NEGATIVE MG/DL
HCG UR QL: NEGATIVE
HCT VFR BLD AUTO: 41.4 % (ref 35–47)
HGB BLD-MCNC: 14.2 G/DL (ref 11.7–15.7)
HGB UR QL STRIP: ABNORMAL
IMM GRANULOCYTES # BLD: 0 10E3/UL
IMM GRANULOCYTES NFR BLD: 0 %
KETONES UR STRIP-MCNC: NEGATIVE MG/DL
LEUKOCYTE ESTERASE UR QL STRIP: NEGATIVE
LIPASE SERPL-CCNC: 10 U/L (ref 13–60)
LYMPHOCYTES # BLD AUTO: 2.4 10E3/UL (ref 0.8–5.3)
LYMPHOCYTES NFR BLD AUTO: 32 %
MCH RBC QN AUTO: 31.3 PG (ref 26.5–33)
MCHC RBC AUTO-ENTMCNC: 34.3 G/DL (ref 31.5–36.5)
MCV RBC AUTO: 91 FL (ref 78–100)
MONOCYTES # BLD AUTO: 0.4 10E3/UL (ref 0–1.3)
MONOCYTES NFR BLD AUTO: 5 %
MUCOUS THREADS #/AREA URNS LPF: PRESENT /LPF
N GONORRHOEA DNA SPEC QL NAA+PROBE: NEGATIVE
NEUTROPHILS # BLD AUTO: 4.6 10E3/UL (ref 1.6–8.3)
NEUTROPHILS NFR BLD AUTO: 62 %
NITRATE UR QL: NEGATIVE
NRBC # BLD AUTO: 0 10E3/UL
NRBC BLD AUTO-RTO: 0 /100
PH UR STRIP: 6 [PH] (ref 5–7)
PLATELET # BLD AUTO: 337 10E3/UL (ref 150–450)
POTASSIUM SERPL-SCNC: 3.9 MMOL/L (ref 3.4–5.3)
PROT SERPL-MCNC: 7.8 G/DL (ref 6.4–8.3)
RBC # BLD AUTO: 4.54 10E6/UL (ref 3.8–5.2)
RBC URINE: 16 /HPF
SODIUM SERPL-SCNC: 141 MMOL/L (ref 135–145)
SP GR UR STRIP: 1.01 (ref 1–1.03)
SQUAMOUS EPITHELIAL: 1 /HPF
UROBILINOGEN UR STRIP-MCNC: NORMAL MG/DL
WBC # BLD AUTO: 7.4 10E3/UL (ref 4–11)
WBC URINE: 5 /HPF

## 2023-10-30 PROCEDURE — 87591 N.GONORRHOEAE DNA AMP PROB: CPT | Performed by: EMERGENCY MEDICINE

## 2023-10-30 PROCEDURE — 83690 ASSAY OF LIPASE: CPT | Performed by: EMERGENCY MEDICINE

## 2023-10-30 PROCEDURE — 36415 COLL VENOUS BLD VENIPUNCTURE: CPT | Performed by: EMERGENCY MEDICINE

## 2023-10-30 PROCEDURE — 80053 COMPREHEN METABOLIC PANEL: CPT | Performed by: EMERGENCY MEDICINE

## 2023-10-30 PROCEDURE — 99283 EMERGENCY DEPT VISIT LOW MDM: CPT | Performed by: EMERGENCY MEDICINE

## 2023-10-30 PROCEDURE — 85004 AUTOMATED DIFF WBC COUNT: CPT | Performed by: EMERGENCY MEDICINE

## 2023-10-30 PROCEDURE — 99284 EMERGENCY DEPT VISIT MOD MDM: CPT | Performed by: EMERGENCY MEDICINE

## 2023-10-30 PROCEDURE — 87086 URINE CULTURE/COLONY COUNT: CPT | Performed by: EMERGENCY MEDICINE

## 2023-10-30 PROCEDURE — 81001 URINALYSIS AUTO W/SCOPE: CPT | Performed by: EMERGENCY MEDICINE

## 2023-10-30 PROCEDURE — 87491 CHLMYD TRACH DNA AMP PROBE: CPT | Performed by: EMERGENCY MEDICINE

## 2023-10-30 PROCEDURE — 81025 URINE PREGNANCY TEST: CPT | Performed by: EMERGENCY MEDICINE

## 2023-10-30 ASSESSMENT — ACTIVITIES OF DAILY LIVING (ADL): ADLS_ACUITY_SCORE: 33

## 2023-10-30 NOTE — RESULT ENCOUNTER NOTE
Final result for both N. Gonorrhoeae PCR and Chlamydia Trachomatis PCR are NEGATIVE.  No treatment or change in treatment per [facility] ED Lab Result N. Gonorrhea AND/OR Chlamydia T. protocol.
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
## 2023-10-30 NOTE — DISCHARGE INSTRUCTIONS
Please return to the emergency department if you develop a fever, severe pain or any new symptoms.  Otherwise please follow-up promptly with your primary care provider.

## 2023-10-31 LAB — BACTERIA UR CULT: NORMAL

## 2024-06-02 ENCOUNTER — HEALTH MAINTENANCE LETTER (OUTPATIENT)
Age: 29
End: 2024-06-02

## 2024-09-07 ENCOUNTER — HOSPITAL ENCOUNTER (EMERGENCY)
Facility: CLINIC | Age: 29
Discharge: HOME | End: 2024-09-08
Attending: EMERGENCY MEDICINE | Admitting: EMERGENCY MEDICINE
Payer: COMMERCIAL

## 2024-09-07 VITALS
BODY MASS INDEX: 30.32 KG/M2 | DIASTOLIC BLOOD PRESSURE: 84 MMHG | OXYGEN SATURATION: 99 % | HEIGHT: 67 IN | HEART RATE: 81 BPM | TEMPERATURE: 98.1 F | RESPIRATION RATE: 18 BRPM | WEIGHT: 193.2 LBS | SYSTOLIC BLOOD PRESSURE: 126 MMHG

## 2024-09-07 DIAGNOSIS — R22.32 ARM MASS, LEFT: ICD-10-CM

## 2024-09-07 PROCEDURE — 76882 US LMTD JT/FCL EVL NVASC XTR: CPT | Mod: 26 | Performed by: EMERGENCY MEDICINE

## 2024-09-07 PROCEDURE — 99283 EMERGENCY DEPT VISIT LOW MDM: CPT | Mod: 25 | Performed by: EMERGENCY MEDICINE

## 2024-09-07 PROCEDURE — 99284 EMERGENCY DEPT VISIT MOD MDM: CPT | Mod: 25 | Performed by: EMERGENCY MEDICINE

## 2024-09-07 PROCEDURE — 76882 US LMTD JT/FCL EVL NVASC XTR: CPT | Mod: LT | Performed by: EMERGENCY MEDICINE

## 2024-09-07 ASSESSMENT — COLUMBIA-SUICIDE SEVERITY RATING SCALE - C-SSRS
2. HAVE YOU ACTUALLY HAD ANY THOUGHTS OF KILLING YOURSELF IN THE PAST MONTH?: NO
1. IN THE PAST MONTH, HAVE YOU WISHED YOU WERE DEAD OR WISHED YOU COULD GO TO SLEEP AND NOT WAKE UP?: NO
6. HAVE YOU EVER DONE ANYTHING, STARTED TO DO ANYTHING, OR PREPARED TO DO ANYTHING TO END YOUR LIFE?: NO

## 2024-09-08 ENCOUNTER — ANCILLARY PROCEDURE (OUTPATIENT)
Dept: ULTRASOUND IMAGING | Facility: CLINIC | Age: 29
End: 2024-09-08
Attending: EMERGENCY MEDICINE
Payer: COMMERCIAL

## 2024-09-08 NOTE — ED TRIAGE NOTES
Triage Assessment (Adult)       Row Name 09/07/24 9000          Triage Assessment    Airway WDL WDL        Respiratory WDL    Respiratory WDL WDL        Skin Circulation/Temperature WDL    Skin Circulation/Temperature WDL WDL        Cardiac WDL    Cardiac WDL WDL        Peripheral/Neurovascular WDL    Peripheral Neurovascular WDL WDL        Cognitive/Neuro/Behavioral WDL    Cognitive/Neuro/Behavioral WDL WDL

## 2024-09-08 NOTE — DISCHARGE INSTRUCTIONS
For pain, please take 975-1000mg acetaminophen (tylenol) every 8 hours -- do not take more than 3000mg in a 24 hour period.    You can also take 600mg ibuprofen (motrin/advil) every 6 hours for pain  Please follow-up with your primary care doctor for routine evaluation as well as if you do not get a satisfactory surgical appointment for a biopsy  Return to the emergency department if you are having worsening pain, or any other concerning symptoms

## 2024-09-08 NOTE — ED PROVIDER NOTES
"ED Provider Note  [facility]      History     Chief Complaint   Patient presents with    Mass     Mass on the left forearm for 4 years. Pt reports throbbing pain and on and off numbness on the affected arm     HPI  Rajan Graham is a 29 female with asthma, otherwise no ongoing medical problems, who has had a left forearm mass for over 2 years which has been mobile and nonpainful. However, over the course of several weeks has become slightly larger and has become sensitive, as well as having shooting paresthesias down her distal left forearm and hand. No fevers, drainage, or other symptoms.    This part of the medical record was transcribed by Elliott Reeves, Medical Scribe, from a dictation done by Nito Arguello MD.         Physical Exam   BP: 126/84  Pulse: 81  Temp: 98.1  F (36.7  C)  Resp: 18  Height: 170.2 cm (5' 7\")  Weight: 87.6 kg (193 lb 3.2 oz)  SpO2: 99 %  Physical Exam  Mobile, soft, mildly tender 2 x 4 cm mass over left forearm with no warmth, redness, or streaking. No tenderness to palpation over left hand or wrist. Able to range at wrist in all directions. No numbness or strength deficits distally on left arm.    ED Course, Procedures, & Data      Procedures  Results for orders placed during the hospital encounter of 09/07/24    POC US SOFT TISSUE    Impression  Limited Soft Tissue Ultrasound, performed and interpreted by me.    Indication:  painful mass. Evaluate for cellulitis vs abscess.    Body location: left upper extremity    Findings:  There is no cobblestoning suggestive of cellulitis in the evaluated area. There is no fluid collection to suggest abscess. No foreign body identified    IMPRESSION: No cellulitis or abscess.              Results for orders placed or performed during the hospital encounter of 09/07/24   POC US SOFT TISSUE     Status: None    Impression    Limited Soft Tissue Ultrasound, performed and interpreted by me.    Indication:  painful " mass. Evaluate for cellulitis vs abscess.     Body location: left upper extremity    Findings:  There is no cobblestoning suggestive of cellulitis in the evaluated area. There is no fluid collection to suggest abscess. No foreign body identified    IMPRESSION: No cellulitis or abscess.             Medications - No data to display  Labs Ordered and Resulted from Time of ED Arrival to Time of ED Departure - No data to display  POC US SOFT TISSUE   Final Result   Limited Soft Tissue Ultrasound, performed and interpreted by me.      Indication:  painful mass. Evaluate for cellulitis vs abscess.       Body location: left upper extremity      Findings:  There is no cobblestoning suggestive of cellulitis in the evaluated area. There is no fluid collection to suggest abscess. No foreign body identified      IMPRESSION: No cellulitis or abscess.                         Critical care was not performed.     Medical Decision Making  The patient's presentation was of moderate complexity (an undiagnosed new problem with uncertain prognosis).    The patient's evaluation involved:  history and exam without other MDM data elements    The patient's management necessitated moderate risk (a decision regarding minor procedure (incision & drainage) with risk factors of none).    Assessment & Plan    Stable mass with new pain in left forearm. Will rule out signs of abscess with bedside ultrasound. Bedside ultrasound shows no sign of fluid collection, but rather more consistent with contiguous mass related to connective tissue such as muscle fascia or nerve. No indication for incision and drainage, rather referred to general surgery for evaluation in outpatient setting for biopsy. Will also refer to primary doctor for evaluation and recommend over-the-counter pain medication.     I have reviewed the nursing notes. I have reviewed the findings, diagnosis, plan and need for follow up with the patient.    Discharge Medication List as of  9/8/2024 12:17 AM          Final diagnoses:   Arm mass, left     Nito Arguello MD  [facility] EMERGENCY DEPARTMENT  9/7/2024     Nito Arguello MD  09/08/24 0046

## 2024-09-09 NOTE — TELEPHONE ENCOUNTER
REFERRAL INFORMATION:  Referring Provider: Dr. Nito Arguello  Referring Clinic: [facility] - ED  Reason for Visit/Diagnosis: Left Arm Mass       FUTURE VISIT INFORMATION:  Appointment Date: 10/4/2024  Appointment Time: 9:30 AM     NOTES RECORD STATUS  DETAILS   OFFICE NOTE from Referring Provider N/A    OFFICE NOTE from Other Specialists N/A    HOSPITAL DISCHARGE SUMMARY/ ED VISITS  Care Everywhere / Internal [facility]:  9/7/24 - ED OV with Dr. Arguello    [facility] [place] OV with Dr. Navarro   OPERATIVE REPORT N/A    PERTINENT LABS Care Everywhere / Internal    IMAGING (CT, MRI, US, XR)  Internal [facility]:  9/8/24 - US Upper Extremity

## 2024-10-04 ENCOUNTER — PATIENT OUTREACH (OUTPATIENT)
Dept: SURGERY | Facility: CLINIC | Age: 29
End: 2024-10-04

## 2024-10-04 ENCOUNTER — ANCILLARY PROCEDURE (OUTPATIENT)
Dept: ULTRASOUND IMAGING | Facility: CLINIC | Age: 29
End: 2024-10-04
Attending: SURGERY
Payer: COMMERCIAL

## 2024-10-04 ENCOUNTER — OFFICE VISIT (OUTPATIENT)
Dept: SURGERY | Facility: CLINIC | Age: 29
End: 2024-10-04
Attending: EMERGENCY MEDICINE
Payer: COMMERCIAL

## 2024-10-04 ENCOUNTER — PRE VISIT (OUTPATIENT)
Dept: SURGERY | Facility: CLINIC | Age: 29
End: 2024-10-04

## 2024-10-04 VITALS
HEIGHT: 67 IN | DIASTOLIC BLOOD PRESSURE: 83 MMHG | HEART RATE: 79 BPM | WEIGHT: 192.8 LBS | OXYGEN SATURATION: 100 % | SYSTOLIC BLOOD PRESSURE: 139 MMHG | BODY MASS INDEX: 30.26 KG/M2

## 2024-10-04 DIAGNOSIS — R22.32 ARM MASS, LEFT: ICD-10-CM

## 2024-10-04 PROCEDURE — 76882 US LMTD JT/FCL EVL NVASC XTR: CPT | Mod: LT | Performed by: RADIOLOGY

## 2024-10-04 PROCEDURE — 99203 OFFICE O/P NEW LOW 30 MIN: CPT | Performed by: SURGERY

## 2024-10-04 ASSESSMENT — PATIENT HEALTH QUESTIONNAIRE - PHQ9
10. IF YOU CHECKED OFF ANY PROBLEMS, HOW DIFFICULT HAVE THESE PROBLEMS MADE IT FOR YOU TO DO YOUR WORK, TAKE CARE OF THINGS AT HOME, OR GET ALONG WITH OTHER PEOPLE: NOT DIFFICULT AT ALL
SUM OF ALL RESPONSES TO PHQ QUESTIONS 1-9: 0
SUM OF ALL RESPONSES TO PHQ QUESTIONS 1-9: 0

## 2024-10-04 ASSESSMENT — PAIN SCALES - GENERAL: PAINLEVEL: NO PAIN (0)

## 2024-10-04 NOTE — PATIENT INSTRUCTIONS
You met with Dr. Kelby Acosta.      Today's visit instructions:    Dr. Acosta would like you to undergo an ultrasound of your arm. Our office will call you with the results and recommendations.     If you have questions please contact Yuli RN or Skye RN during regular clinic hours, Monday through Friday 7:30 AM - 4:00 PM, or you can contact us via [employer] at anytime.       If you have urgent needs after-hours, weekends, or holidays please call the hospital at [phone] and ask to speak with our on-call General Surgery Team.    Appointment [phone]  Nurse Advice (Yuli or Skye): [phone]   Surgery Scheduler (Mary): [phone]  Fax: [phone]

## 2024-10-04 NOTE — PROGRESS NOTES
10/04/24    3:39 PM     Rajan Graham met with Dr. Acosta today and he recommended that the patient undergo an ultrasound of the mass.  Results are now available and the patient will be contacted when reviewed by the provider.

## 2024-10-04 NOTE — NURSING NOTE
"Chief Complaint   Patient presents with    New Patient     Left Arm mass       Vitals:    10/04/24 0939   BP: 139/83   BP Location: Left arm   Patient Position: Sitting   Cuff Size: Adult Regular   Pulse: 79   SpO2: 100%   Weight: 87.5 kg (192 lb 12.8 oz)   Height: 1.702 m (5' 7\")       Body mass index is 30.2 kg/m .                          Delfino Veronica, EMT    "
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
## 2024-10-04 NOTE — NURSING NOTE
Pre and Post op Patient Education/Teaching Flowsheet  Relevant Diagnosis:  Lipoma (D17.30)  Teaching Topic:  Pre and post op teaching  Person(s) Involved in teaching:  Patient     Motivation Level:  Asks Questions:  Yes  Eager to Learn:  Yes  Cooperative:  Yes  Receptive (willing/able to accept information):  Yes  Any cultural factors/Yarsanism beliefs that may influence understanding or compliance?  No    Patient/caregiver/family demonstrates understanding of the following:  Reason for the appointment, diagnosis, and treatment plan:  Yes  Patient demonstrates understanding of the following:  Pre-op bowel prep:  No  Post-op pain management recommendations (medications, ice compress, binder/athletic supporter (if applicable), etc.:  Yes  Inguinal hernia patients:  Post-op urinary retention- discussed signs/symptoms and visit to ER for Pfeiffer catheter placement and to stay in place for at least 48 hours:  NA  Restrictions:  NA  Medications to take the day of surgery:  Per PCP  Blood thinner medications discussed and when to stop (if applicable):  Yes  Wound care:  Yes  Diabetes medication management (if applicable):  Per PCP  Which situations necessitate calling provider and whom to contact:  Discussed how to contact the hospital, nurse, and clinic scheduling staff if necessary    Infection Prevention: Patient demonstrates understanding of the following:  Patient instructed on hand hygiene:  Yes  Surgical procedure site care will be taught and will be reviewed at the time of discharge  Signs and symptoms of infection taught:  Yes  Wound care reviewed and will be taught at the time of discharge  Central venous catheter care will be taught at the time of discharge (if applicable)    Post-op follow-up:  Instructional materials used/given/mailed:  Surgical logistics, post op teaching sheet, and surgical scrub    Logistics:  Date and time of surgery:  TBD  Location of surgery: [facility] [place]-  5th Floor  History and Physical and any other testing necessary prior to surgery:  No  Required time line for completion of History and Physical and any pre-op testing:  No  Discuss need for someone to drive patient home and stay with them for 24 hours:  No  Pre-op showering/scrub information with Surgical Scrub:  Yes  NPO Guidelines:  NPO per Anesthesia Guidelines

## 2024-10-07 ENCOUNTER — PATIENT OUTREACH (OUTPATIENT)
Dept: SURGERY | Facility: CLINIC | Age: 29
End: 2024-10-07
Payer: COMMERCIAL

## 2024-10-07 DIAGNOSIS — R22.32 ARM MASS, LEFT: Primary | ICD-10-CM

## 2024-10-07 NOTE — PATIENT INSTRUCTIONS
Reminder:  Surgery Requirements  Your surgery will be at the [facility]- 5th Floor  You will need to arrive 1 hour early based on the location of your surgery.  You will need a Pre-op physical before your procedure- your surgeon will do this the day of surgery.   Stop any blood thinners, vitamins, minerals, or herbal supplements 5 days before surgery.  If you are taking a prescribed blood thinner or weight loss medication please let us know for specific instructions.  You may eat/drink/drive without restrictions/limitations.  Wash with the soap given/mailed from the clinic the night before and morning of surgery. See instructions in the Surgery Packet.  If you would like a procedure estimate please call Cost of Care at [phone] during the hours of 8 AM - 3 PM (option #2 for on-line request and option #3 for a representative).     Mass/Lump Removal       Before the procedure:  Do not shave the area where the lump/mass is located.     Incision care   You may take a shower the day after surgery. Carefully wash your incision with soap and water. Do not submerge yourself in water (bath, whirlpool, hot tub, pool, lake) for 14 days after surgery.   Remove the gauze bandage 1-2 days after surgery but leave the medical tape (Steri-Strips) or glue in place. These will loosen and fall off on their own 1-2 weeks after surgery.     Always wash your hands before touching your incisions or removing bandages.   It is not unusual to form a collection of fluid or blood under your incision that may feel firm or squishy- it can take several weeks to months for your body to reabsorb it.  At times, it may even drain.  If that should happen keep the area clean with soap, water,  and cover with a clean gauze dressing. You can change this daily or as needed.     Other medicines   Wait to start aspirin or blood thinners until the day after surgery. You can continue your regular medicines at your normal  time the day after surgery.   Your pain medicine may cause constipation (hard, dry stools). To help with this, take the stool softener your doctor gave you or an over-the-counter stool softener or laxative. You can stop taking this when you are no longer taking pain medicine and your bowel movements are back to normal.      For pain or discomfort   Please use over-the-counter medication, use acetaminophen (Tylenol) or ibuprofen (Advil, Motrin) as instructed on the box for discomfort. If you were prescribed narcotic pain medicine, take as needed and as instructed on the bottle (narcotics are not always prescribed for this procedure). Do not take Tylenol if it is in your narcotic pain medication.    Use an ice pack on your surgical cut (incision) for 20 minutes at a time as needed for the first 24 hours. Be sure to protect your skin by putting a cloth between the ice pack and your skin.      Activities   No driving until you feel it s safe to do so. Don t drive while taking narcotic pain medicine.      Diet   You can eat your regular meals after surgery.      Results   You should know any test results about 5 to 7 business days after surgery       When to call the doctor   Call your doctor if you have:   A fever above 101 F (38.3 C) (taken under the tongue), or a fever or chills lasting more than a day.   Redness at the incision site.   Any fluid or blood draining from the incision, especially if it smells bad.    Severe pain that doesn t improve with pain medicine.      We will call you 2 to 4 days after surgery to review this handout, answer questions and help arrange after-surgery care. If you have questions or concerns, please call [phone] during regular office hours. If you need to call after business hours, call [phone] and ask to page the surgeon on-call.     IMPORTANT:  Prior to your surgical procedure, a nurse will be contacting you to obtain a health history.   If they do not reach you by noon the  day prior to your surgery, your surgery will be cancelled. Phone:  [phone] ([facility]) or [phone] ([place]).

## 2024-10-07 NOTE — PROGRESS NOTES
Pre and Post op Patient Education/Teaching Flowsheet  Relevant Diagnosis:  Lipoma (D17.30)  Teaching Topic:  Pre and post op teaching  Person(s) Involved in teaching:  Patient     Motivation Level:  Asks Questions:  Yes  Eager to Learn:  Yes  Cooperative:  Yes  Receptive (willing/able to accept information):  Yes  Any cultural factors/Spiritism beliefs that may influence understanding or compliance?  No    Patient/caregiver/family demonstrates understanding of the following:  Reason for the appointment, diagnosis, and treatment plan:  Yes  Patient demonstrates understanding of the following:  Pre-op bowel prep:  No  Post-op pain management recommendations (medications, ice compress, binder/athletic supporter (if applicable), etc.:  Yes  Inguinal hernia patients:  Post-op urinary retention- discussed signs/symptoms and visit to ER for Pfeiffer catheter placement and to stay in place for at least 48 hours:  NA  Restrictions:  NA  Medications to take the day of surgery:  Per PCP  Blood thinner medications discussed and when to stop (if applicable):  Yes  Wound care:  Yes  Diabetes medication management (if applicable):  Per PCP  Which situations necessitate calling provider and whom to contact:  Discussed how to contact the hospital, nurse, and clinic scheduling staff if necessary    Infection Prevention: Patient demonstrates understanding of the following:  Patient instructed on hand hygiene:  Yes  Surgical procedure site care will be taught and will be reviewed at the time of discharge  Signs and symptoms of infection taught:  Yes  Wound care reviewed and will be taught at the time of discharge  Central venous catheter care will be taught at the time of discharge (if applicable)    Post-op follow-up:  Instructional materials used/given/mailed:  Surgical logistics, post op teaching sheet, and surgical scrub    Logistics:  Date and time of surgery:  TBD  Location of surgery: [facility] [place]-  5th Floor  History and Physical and any other testing necessary prior to surgery:  No  Required time line for completion of History and Physical and any pre-op testing:  No  Discuss need for someone to drive patient home and stay with them for 24 hours:  No  Pre-op showering/scrub information with Surgical Scrub:  Yes  NPO Guidelines:  No dietary restrictions

## 2024-10-10 ENCOUNTER — TELEPHONE (OUTPATIENT)
Dept: SURGERY | Facility: CLINIC | Age: 29
End: 2024-10-10
Payer: COMMERCIAL

## 2024-10-10 PROBLEM — R22.32 ARM MASS, LEFT: Status: ACTIVE | Noted: 2024-10-07

## 2025-06-14 ENCOUNTER — HEALTH MAINTENANCE LETTER (OUTPATIENT)
Age: 30
End: 2025-06-14

## (undated) DEVICE — BASIN SET MAJOR

## (undated) DEVICE — STRAP KNEE/BODY 31143004

## (undated) DEVICE — DRSG ABDOMINAL 07 1/2X8" 7197D

## (undated) DEVICE — SOL NACL 0.9% IRRIG 1000ML BOTTLE 07138-09

## (undated) DEVICE — DRSG STERI STRIP 1/4X3" R1541

## (undated) DEVICE — PACK C-SECTION LF PL15OTA83B

## (undated) DEVICE — STOCKING SLEEVE COMPRESSION CALF LG

## (undated) DEVICE — SUCTION CANISTER MEDIVAC LINER 1500ML W/LID 65651-515

## (undated) DEVICE — SU DERMABOND ADVANCED .7ML DNX12

## (undated) DEVICE — BARRIER SEPRAFILM 5X6" SINGLE SHEET 4301-02

## (undated) DEVICE — SU MONOCRYL 0 CTB-1 36" YB946

## (undated) DEVICE — SOL WATER IRRIG 1000ML BOTTLE 07139-09

## (undated) DEVICE — CATH TRAY FOLEY SURESTEP 16FR W/URINE MTR STATLK LF A303416A

## (undated) DEVICE — SU VICRYL 0 CT-1 36" J346H

## (undated) DEVICE — ESU GROUND PAD UNIVERSAL W/O CORD

## (undated) DEVICE — GLOVE PROTEXIS BLUE W/NEU-THERA 6.5  2D73EB65

## (undated) DEVICE — PREP CHLORAPREP 26ML TINTED ORANGE  260815

## (undated) DEVICE — GLOVE ESTEEM POWDER FREE SMT 6.0  2D72PT60

## (undated) DEVICE — SOL WATER IRRIG 1000ML BOTTLE 2F7114

## (undated) DEVICE — CATH TRAY FOLEY SURESTEP 16FR WDRAIN BAG STLK LATEX A300316A

## (undated) DEVICE — DRAPE IOBAN C-SECTION W/POUCH 30X35" 6657

## (undated) DEVICE — SOL NACL 0.9% IRRIG 1000ML BOTTLE 2F7124

## (undated) RX ORDER — MORPHINE SULFATE 1 MG/ML
INJECTION, SOLUTION EPIDURAL; INTRATHECAL; INTRAVENOUS
Status: DISPENSED
Start: 2021-02-23

## (undated) RX ORDER — PHENYLEPHRINE HCL IN 0.9% NACL 1 MG/10 ML
SYRINGE (ML) INTRAVENOUS
Status: DISPENSED
Start: 2019-06-07

## (undated) RX ORDER — FENTANYL CITRATE 50 UG/ML
INJECTION, SOLUTION INTRAMUSCULAR; INTRAVENOUS
Status: DISPENSED
Start: 2019-06-07

## (undated) RX ORDER — MORPHINE SULFATE 1 MG/ML
INJECTION, SOLUTION EPIDURAL; INTRATHECAL; INTRAVENOUS
Status: DISPENSED
Start: 2019-06-07

## (undated) RX ORDER — ONDANSETRON 2 MG/ML
INJECTION INTRAMUSCULAR; INTRAVENOUS
Status: DISPENSED
Start: 2019-06-07

## (undated) RX ORDER — ONDANSETRON 2 MG/ML
INJECTION INTRAMUSCULAR; INTRAVENOUS
Status: DISPENSED
Start: 2021-02-23

## (undated) RX ORDER — FENTANYL CITRATE 50 UG/ML
INJECTION, SOLUTION INTRAMUSCULAR; INTRAVENOUS
Status: DISPENSED
Start: 2021-02-23

## (undated) RX ORDER — KETOROLAC TROMETHAMINE 30 MG/ML
INJECTION, SOLUTION INTRAMUSCULAR; INTRAVENOUS
Status: DISPENSED
Start: 2021-02-23

## (undated) RX ORDER — KETOROLAC TROMETHAMINE 30 MG/ML
INJECTION, SOLUTION INTRAMUSCULAR; INTRAVENOUS
Status: DISPENSED
Start: 2019-06-07

## (undated) RX ORDER — NALBUPHINE HYDROCHLORIDE 10 MG/ML
INJECTION, SOLUTION INTRAMUSCULAR; INTRAVENOUS; SUBCUTANEOUS
Status: DISPENSED
Start: 2021-02-23

## (undated) RX ORDER — FENTANYL CITRATE-0.9 % NACL/PF 10 MCG/ML
PLASTIC BAG, INJECTION (ML) INTRAVENOUS
Status: DISPENSED
Start: 2021-02-23